# Patient Record
Sex: MALE | Race: BLACK OR AFRICAN AMERICAN | Employment: FULL TIME | URBAN - METROPOLITAN AREA
[De-identification: names, ages, dates, MRNs, and addresses within clinical notes are randomized per-mention and may not be internally consistent; named-entity substitution may affect disease eponyms.]

---

## 2018-03-26 ENCOUNTER — HOSPITAL ENCOUNTER (OUTPATIENT)
Dept: LAB | Age: 48
Discharge: HOME | End: 2018-03-26

## 2018-03-26 LAB
25(OH)D3 SERPL-MCNC: 18.2 NG/ML (ref 30–100)
ALBUMIN SERPL-MCNC: 3.8 G/DL (ref 3.5–5)
ALBUMIN/GLOB SERPL: 1.1 {RATIO} (ref 1.1–2.2)
ALP SERPL-CCNC: 82 U/L (ref 45–117)
ALT SERPL-CCNC: 20 U/L (ref 12–78)
ANION GAP SERPL CALC-SCNC: 6 MMOL/L (ref 5–15)
AST SERPL-CCNC: 13 U/L (ref 15–37)
BASOPHILS # BLD: 0.1 K/UL (ref 0–0.1)
BASOPHILS NFR BLD: 1 % (ref 0–1)
BILIRUB SERPL-MCNC: 0.6 MG/DL (ref 0.2–1)
BUN SERPL-MCNC: 15 MG/DL (ref 6–20)
BUN/CREAT SERPL: 15 (ref 12–20)
CALCIUM SERPL-MCNC: 9.2 MG/DL (ref 8.5–10.1)
CHLORIDE SERPL-SCNC: 105 MMOL/L (ref 97–108)
CHOLEST SERPL-MCNC: 187 MG/DL
CO2 SERPL-SCNC: 26 MMOL/L (ref 21–32)
CREAT SERPL-MCNC: 0.99 MG/DL (ref 0.7–1.3)
DIFFERENTIAL METHOD BLD: NORMAL
EOSINOPHIL # BLD: 0.2 K/UL (ref 0–0.4)
EOSINOPHIL NFR BLD: 3 % (ref 0–7)
ERYTHROCYTE [DISTWIDTH] IN BLOOD BY AUTOMATED COUNT: 12.6 % (ref 11.5–14.5)
EST. AVERAGE GLUCOSE BLD GHB EST-MCNC: ABNORMAL MG/DL
FERRITIN SERPL-MCNC: 535 NG/ML (ref 26–388)
GLOBULIN SER CALC-MCNC: 3.4 G/DL (ref 2–4)
GLUCOSE SERPL-MCNC: 157 MG/DL (ref 65–100)
HBA1C MFR BLD: 15.5 % (ref 4.2–6.3)
HCT VFR BLD AUTO: 42.1 % (ref 36.6–50.3)
HDLC SERPL-MCNC: 45 MG/DL
HDLC SERPL: 4.2 {RATIO} (ref 0–5)
HGB BLD-MCNC: 13.7 G/DL (ref 12.1–17)
IMM GRANULOCYTES # BLD: 0 K/UL (ref 0–0.04)
IMM GRANULOCYTES NFR BLD AUTO: 0 % (ref 0–0.5)
LDLC SERPL CALC-MCNC: 79.4 MG/DL (ref 0–100)
LIPID PROFILE,FLP: ABNORMAL
LYMPHOCYTES # BLD: 2.1 K/UL (ref 0.8–3.5)
LYMPHOCYTES NFR BLD: 26 % (ref 12–49)
MAGNESIUM SERPL-MCNC: 1.5 MG/DL (ref 1.6–2.4)
MCH RBC QN AUTO: 29.7 PG (ref 26–34)
MCHC RBC AUTO-ENTMCNC: 32.5 G/DL (ref 30–36.5)
MCV RBC AUTO: 91.3 FL (ref 80–99)
MONOCYTES # BLD: 0.6 K/UL (ref 0–1)
MONOCYTES NFR BLD: 8 % (ref 5–13)
NEUTS SEG # BLD: 5.1 K/UL (ref 1.8–8)
NEUTS SEG NFR BLD: 63 % (ref 32–75)
NRBC # BLD: 0 K/UL (ref 0–0.01)
NRBC BLD-RTO: 0 PER 100 WBC
PLATELET # BLD AUTO: 253 K/UL (ref 150–400)
PMV BLD AUTO: 12.7 FL (ref 8.9–12.9)
POTASSIUM SERPL-SCNC: 4.3 MMOL/L (ref 3.5–5.1)
PROT SERPL-MCNC: 7.2 G/DL (ref 6.4–8.2)
RBC # BLD AUTO: 4.61 M/UL (ref 4.1–5.7)
SODIUM SERPL-SCNC: 137 MMOL/L (ref 136–145)
TRIGL SERPL-MCNC: 313 MG/DL (ref ?–150)
TSH SERPL DL<=0.05 MIU/L-ACNC: 0.98 UIU/ML (ref 0.36–3.74)
URATE SERPL-MCNC: 4.9 MG/DL (ref 3.5–7.2)
VLDLC SERPL CALC-MCNC: 62.6 MG/DL
WBC # BLD AUTO: 8.2 K/UL (ref 4.1–11.1)

## 2018-03-26 PROCEDURE — 82728 ASSAY OF FERRITIN: CPT | Performed by: FAMILY MEDICINE

## 2018-03-26 PROCEDURE — 85025 COMPLETE CBC W/AUTO DIFF WBC: CPT | Performed by: FAMILY MEDICINE

## 2018-03-26 PROCEDURE — 84550 ASSAY OF BLOOD/URIC ACID: CPT | Performed by: FAMILY MEDICINE

## 2018-03-26 PROCEDURE — 83036 HEMOGLOBIN GLYCOSYLATED A1C: CPT | Performed by: FAMILY MEDICINE

## 2018-03-26 PROCEDURE — 82306 VITAMIN D 25 HYDROXY: CPT | Performed by: FAMILY MEDICINE

## 2018-03-26 PROCEDURE — 87389 HIV-1 AG W/HIV-1&-2 AB AG IA: CPT | Performed by: FAMILY MEDICINE

## 2018-03-26 PROCEDURE — 80053 COMPREHEN METABOLIC PANEL: CPT | Performed by: FAMILY MEDICINE

## 2018-03-26 PROCEDURE — 86592 SYPHILIS TEST NON-TREP QUAL: CPT | Performed by: FAMILY MEDICINE

## 2018-03-26 PROCEDURE — 84153 ASSAY OF PSA TOTAL: CPT | Performed by: FAMILY MEDICINE

## 2018-03-26 PROCEDURE — 83735 ASSAY OF MAGNESIUM: CPT | Performed by: FAMILY MEDICINE

## 2018-03-26 PROCEDURE — 80061 LIPID PANEL: CPT | Performed by: FAMILY MEDICINE

## 2018-03-26 PROCEDURE — 84443 ASSAY THYROID STIM HORMONE: CPT | Performed by: FAMILY MEDICINE

## 2018-03-27 LAB — RPR SER QL: NONREACTIVE

## 2018-03-28 LAB
HIV 1+2 AB+HIV1 P24 AG SERPL QL IA: NONREACTIVE
HIV12 RESULT COMMENT, HHIVC: NORMAL
PSA SERPL-MCNC: 0.4 NG/ML (ref 0–4)

## 2018-07-31 ENCOUNTER — HOSPITAL ENCOUNTER (OUTPATIENT)
Dept: LAB | Age: 48
Discharge: HOME | End: 2018-07-31

## 2018-07-31 LAB
25(OH)D3 SERPL-MCNC: 16.3 NG/ML (ref 30–100)
ALBUMIN SERPL-MCNC: 4 G/DL (ref 3.5–5)
ALBUMIN/GLOB SERPL: 1.2 {RATIO} (ref 1.1–2.2)
ALP SERPL-CCNC: 96 U/L (ref 45–117)
ALT SERPL-CCNC: 23 U/L (ref 12–78)
ANION GAP SERPL CALC-SCNC: 7 MMOL/L (ref 5–15)
AST SERPL-CCNC: 13 U/L (ref 15–37)
BILIRUB SERPL-MCNC: 0.6 MG/DL (ref 0.2–1)
BUN SERPL-MCNC: 14 MG/DL (ref 6–20)
BUN/CREAT SERPL: 14 (ref 12–20)
CALCIUM SERPL-MCNC: 9 MG/DL (ref 8.5–10.1)
CHLORIDE SERPL-SCNC: 99 MMOL/L (ref 97–108)
CHOLEST SERPL-MCNC: 200 MG/DL
CO2 SERPL-SCNC: 30 MMOL/L (ref 21–32)
CREAT SERPL-MCNC: 1.03 MG/DL (ref 0.7–1.3)
EST. AVERAGE GLUCOSE BLD GHB EST-MCNC: 217 MG/DL
FERRITIN SERPL-MCNC: 454 NG/ML (ref 26–388)
GLOBULIN SER CALC-MCNC: 3.4 G/DL (ref 2–4)
GLUCOSE SERPL-MCNC: 273 MG/DL (ref 65–100)
HBA1C MFR BLD: 9.2 % (ref 4.2–6.3)
HDLC SERPL-MCNC: 50 MG/DL
HDLC SERPL: 4 {RATIO} (ref 0–5)
LDLC SERPL CALC-MCNC: 96.6 MG/DL (ref 0–100)
LIPID PROFILE,FLP: ABNORMAL
MAGNESIUM SERPL-MCNC: 1.5 MG/DL (ref 1.6–2.4)
POTASSIUM SERPL-SCNC: 4.4 MMOL/L (ref 3.5–5.1)
PROT SERPL-MCNC: 7.4 G/DL (ref 6.4–8.2)
SODIUM SERPL-SCNC: 136 MMOL/L (ref 136–145)
TRIGL SERPL-MCNC: 267 MG/DL (ref ?–150)
VLDLC SERPL CALC-MCNC: 53.4 MG/DL

## 2018-07-31 PROCEDURE — 83735 ASSAY OF MAGNESIUM: CPT | Performed by: FAMILY MEDICINE

## 2018-07-31 PROCEDURE — 82728 ASSAY OF FERRITIN: CPT | Performed by: FAMILY MEDICINE

## 2018-07-31 PROCEDURE — 83036 HEMOGLOBIN GLYCOSYLATED A1C: CPT | Performed by: FAMILY MEDICINE

## 2018-07-31 PROCEDURE — 82306 VITAMIN D 25 HYDROXY: CPT | Performed by: FAMILY MEDICINE

## 2018-07-31 PROCEDURE — 80053 COMPREHEN METABOLIC PANEL: CPT | Performed by: FAMILY MEDICINE

## 2018-07-31 PROCEDURE — 80061 LIPID PANEL: CPT | Performed by: FAMILY MEDICINE

## 2019-05-21 ENCOUNTER — HOSPITAL ENCOUNTER (EMERGENCY)
Age: 49
Discharge: HOME | End: 2019-05-21
Attending: EMERGENCY MEDICINE
Payer: COMMERCIAL

## 2019-05-21 VITALS
SYSTOLIC BLOOD PRESSURE: 144 MMHG | WEIGHT: 315 LBS | DIASTOLIC BLOOD PRESSURE: 94 MMHG | HEART RATE: 99 BPM | BODY MASS INDEX: 40.43 KG/M2 | HEIGHT: 74 IN | TEMPERATURE: 98.2 F | OXYGEN SATURATION: 96 % | RESPIRATION RATE: 16 BRPM
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
## 2019-05-21 DIAGNOSIS — S09.90XA CLOSED HEAD INJURY, INITIAL ENCOUNTER: ICD-10-CM

## 2019-05-21 DIAGNOSIS — S01.111A RIGHT EYELID LACERATION, INITIAL ENCOUNTER: Primary | ICD-10-CM

## 2019-05-21 DIAGNOSIS — S80.01XA CONTUSION OF RIGHT KNEE, INITIAL ENCOUNTER: ICD-10-CM

## 2019-05-21 PROCEDURE — 74011250637 HC RX REV CODE- 250/637: Performed by: PHYSICIAN ASSISTANT

## 2019-05-21 PROCEDURE — 75810000293 HC SIMP/SUPERF WND  RPR

## 2019-05-21 PROCEDURE — 77030002888 HC SUT CHRMC J&J -A

## 2019-05-21 PROCEDURE — 90471 IMMUNIZATION ADMIN: CPT

## 2019-05-21 PROCEDURE — 77030018836 HC SOL IRR NACL ICUM -A

## 2019-05-21 PROCEDURE — 74011250636 HC RX REV CODE- 250/636: Performed by: PHYSICIAN ASSISTANT

## 2019-05-21 PROCEDURE — 99283 EMERGENCY DEPT VISIT LOW MDM: CPT

## 2019-05-21 PROCEDURE — 90715 TDAP VACCINE 7 YRS/> IM: CPT | Performed by: PHYSICIAN ASSISTANT

## 2019-05-21 RX ORDER — METFORMIN HYDROCHLORIDE 1000 MG/1
1000 TABLET ORAL 2 TIMES DAILY WITH MEALS
COMMUNITY

## 2019-05-21 RX ORDER — LIDOCAINE HYDROCHLORIDE 10 MG/ML
5 INJECTION, SOLUTION EPIDURAL; INFILTRATION; INTRACAUDAL; PERINEURAL ONCE
Status: COMPLETED | OUTPATIENT
Start: 2019-05-21 | End: 2019-05-21

## 2019-05-21 RX ORDER — POLYMYXIN B SULFATE AND TRIMETHOPRIM 1; 10000 MG/ML; [USP'U]/ML
1 SOLUTION OPHTHALMIC EVERY 4 HOURS
Qty: 10 ML | Refills: 0 | Status: SHIPPED | OUTPATIENT
Start: 2019-05-21 | End: 2021-06-07

## 2019-05-21 RX ORDER — IBUPROFEN 400 MG/1
800 TABLET ORAL
Status: COMPLETED | OUTPATIENT
Start: 2019-05-21 | End: 2019-05-21

## 2019-05-21 RX ADMIN — TETANUS TOXOID, REDUCED DIPHTHERIA TOXOID AND ACELLULAR PERTUSSIS VACCINE, ADSORBED 0.5 ML: 5; 2.5; 8; 8; 2.5 SUSPENSION INTRAMUSCULAR at 10:28

## 2019-05-21 RX ADMIN — IBUPROFEN 800 MG: 400 TABLET, FILM COATED ORAL at 10:28

## 2019-05-21 RX ADMIN — LIDOCAINE HYDROCHLORIDE 5 ML: 10 INJECTION, SOLUTION EPIDURAL; INFILTRATION; INTRACAUDAL; PERINEURAL at 10:32
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
## 2019-05-21 NOTE — LETTER
[facility] EMERGENCY DEPT 
[address] [place] [address] [address] [id number] [phone] Work/School Note Date: 5/21/2019 To Whom It May concern: 
 
Alan Mcdonald was seen and treated today in the emergency room by the following provider(s): 
Attending Provider: Jennifer Strong MD 
Physician Assistant: Allen Labs, PA. Please excuse him from work today and tomorrow.  
 
Sincerely, 
 
 
 
 
RAMANDEEP Mccain
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
## 2019-06-07 ENCOUNTER — HOSPITAL ENCOUNTER (EMERGENCY)
Age: 49
Discharge: HOME | End: 2019-06-07
Attending: EMERGENCY MEDICINE
Payer: SELF-PAY

## 2019-06-07 ENCOUNTER — APPOINTMENT (OUTPATIENT)
Dept: CT IMAGING | Age: 49
End: 2019-06-07
Attending: EMERGENCY MEDICINE
Payer: SELF-PAY

## 2019-06-07 ENCOUNTER — APPOINTMENT (OUTPATIENT)
Dept: GENERAL RADIOLOGY | Age: 49
End: 2019-06-07
Attending: EMERGENCY MEDICINE
Payer: SELF-PAY

## 2019-06-07 VITALS
SYSTOLIC BLOOD PRESSURE: 116 MMHG | HEART RATE: 87 BPM | OXYGEN SATURATION: 95 % | DIASTOLIC BLOOD PRESSURE: 72 MMHG | RESPIRATION RATE: 14 BRPM

## 2019-06-07 DIAGNOSIS — R40.4 TRANSIENT ALTERATION OF AWARENESS: Primary | ICD-10-CM

## 2019-06-07 LAB
ALBUMIN SERPL-MCNC: 3.6 G/DL (ref 3.5–5)
ALBUMIN/GLOB SERPL: 1 {RATIO} (ref 1.1–2.2)
ALP SERPL-CCNC: 98 U/L (ref 45–117)
ALT SERPL-CCNC: 20 U/L (ref 12–78)
AMMONIA PLAS-SCNC: 27 UMOL/L
AMPHET UR QL SCN: NEGATIVE
ANION GAP SERPL CALC-SCNC: 10 MMOL/L (ref 5–15)
AST SERPL-CCNC: 11 U/L (ref 15–37)
ATRIAL RATE: 85 BPM
ATRIAL RATE: 90 BPM
BARBITURATES UR QL SCN: NEGATIVE
BASOPHILS # BLD: 0.1 K/UL (ref 0–0.1)
BASOPHILS NFR BLD: 0 % (ref 0–1)
BENZODIAZ UR QL: NEGATIVE
BILIRUB SERPL-MCNC: 1 MG/DL (ref 0.2–1)
BUN SERPL-MCNC: 10 MG/DL (ref 6–20)
BUN/CREAT SERPL: 6 (ref 12–20)
CALCIUM SERPL-MCNC: 9 MG/DL (ref 8.5–10.1)
CALCULATED P AXIS, ECG09: 58 DEGREES
CALCULATED P AXIS, ECG09: 63 DEGREES
CALCULATED R AXIS, ECG10: 53 DEGREES
CALCULATED R AXIS, ECG10: 58 DEGREES
CALCULATED T AXIS, ECG11: 69 DEGREES
CALCULATED T AXIS, ECG11: 70 DEGREES
CANNABINOIDS UR QL SCN: POSITIVE
CHLORIDE SERPL-SCNC: 98 MMOL/L (ref 97–108)
CO2 SERPL-SCNC: 26 MMOL/L (ref 21–32)
COCAINE UR QL SCN: POSITIVE
COMMENT, HOLDF: NORMAL
CREAT SERPL-MCNC: 1.57 MG/DL (ref 0.7–1.3)
DIAGNOSIS, 93000: NORMAL
DIAGNOSIS, 93000: NORMAL
DIFFERENTIAL METHOD BLD: ABNORMAL
DRUG SCRN COMMENT,DRGCM: ABNORMAL
EOSINOPHIL # BLD: 0.2 K/UL (ref 0–0.4)
EOSINOPHIL NFR BLD: 2 % (ref 0–7)
ERYTHROCYTE [DISTWIDTH] IN BLOOD BY AUTOMATED COUNT: 12.9 % (ref 11.5–14.5)
GLOBULIN SER CALC-MCNC: 3.7 G/DL (ref 2–4)
GLUCOSE BLD STRIP.AUTO-MCNC: 309 MG/DL (ref 65–100)
GLUCOSE BLD STRIP.AUTO-MCNC: 353 MG/DL (ref 65–100)
GLUCOSE BLD STRIP.AUTO-MCNC: 371 MG/DL (ref 65–100)
GLUCOSE BLD STRIP.AUTO-MCNC: 416 MG/DL (ref 65–100)
GLUCOSE BLD STRIP.AUTO-MCNC: 482 MG/DL (ref 65–100)
GLUCOSE SERPL-MCNC: 445 MG/DL (ref 65–100)
HCT VFR BLD AUTO: 44.9 % (ref 36.6–50.3)
HGB BLD-MCNC: 14.8 G/DL (ref 12.1–17)
IMM GRANULOCYTES # BLD AUTO: 0.1 K/UL (ref 0–0.04)
IMM GRANULOCYTES NFR BLD AUTO: 1 % (ref 0–0.5)
LYMPHOCYTES # BLD: 3.7 K/UL (ref 0.8–3.5)
LYMPHOCYTES NFR BLD: 31 % (ref 12–49)
MAGNESIUM SERPL-MCNC: 1.7 MG/DL (ref 1.6–2.4)
MCH RBC QN AUTO: 29.3 PG (ref 26–34)
MCHC RBC AUTO-ENTMCNC: 33 G/DL (ref 30–36.5)
MCV RBC AUTO: 88.9 FL (ref 80–99)
METHADONE UR QL: NEGATIVE
MONOCYTES # BLD: 0.7 K/UL (ref 0–1)
MONOCYTES NFR BLD: 6 % (ref 5–13)
NEUTS SEG # BLD: 7.3 K/UL (ref 1.8–8)
NEUTS SEG NFR BLD: 60 % (ref 32–75)
NRBC # BLD: 0 K/UL (ref 0–0.01)
NRBC BLD-RTO: 0 PER 100 WBC
OPIATES UR QL: NEGATIVE
P-R INTERVAL, ECG05: 166 MS
P-R INTERVAL, ECG05: 168 MS
PCP UR QL: NEGATIVE
PLATELET # BLD AUTO: 260 K/UL (ref 150–400)
PMV BLD AUTO: 11.9 FL (ref 8.9–12.9)
POTASSIUM SERPL-SCNC: 3.7 MMOL/L (ref 3.5–5.1)
PROT SERPL-MCNC: 7.3 G/DL (ref 6.4–8.2)
Q-T INTERVAL, ECG07: 356 MS
Q-T INTERVAL, ECG07: 376 MS
QRS DURATION, ECG06: 72 MS
QRS DURATION, ECG06: 76 MS
QTC CALCULATION (BEZET), ECG08: 423 MS
QTC CALCULATION (BEZET), ECG08: 459 MS
RBC # BLD AUTO: 5.05 M/UL (ref 4.1–5.7)
SAMPLES BEING HELD,HOLD: NORMAL
SERVICE CMNT-IMP: ABNORMAL
SODIUM SERPL-SCNC: 134 MMOL/L (ref 136–145)
TROPONIN I SERPL-MCNC: <0.05 NG/ML
TROPONIN I SERPL-MCNC: <0.05 NG/ML
VENTRICULAR RATE, ECG03: 85 BPM
VENTRICULAR RATE, ECG03: 90 BPM
WBC # BLD AUTO: 12.1 K/UL (ref 4.1–11.1)

## 2019-06-07 PROCEDURE — 85025 COMPLETE CBC W/AUTO DIFF WBC: CPT

## 2019-06-07 PROCEDURE — 70450 CT HEAD/BRAIN W/O DYE: CPT

## 2019-06-07 PROCEDURE — 36415 COLL VENOUS BLD VENIPUNCTURE: CPT

## 2019-06-07 PROCEDURE — 96361 HYDRATE IV INFUSION ADD-ON: CPT

## 2019-06-07 PROCEDURE — 82962 GLUCOSE BLOOD TEST: CPT

## 2019-06-07 PROCEDURE — 96374 THER/PROPH/DIAG INJ IV PUSH: CPT

## 2019-06-07 PROCEDURE — 80053 COMPREHEN METABOLIC PANEL: CPT

## 2019-06-07 PROCEDURE — 99285 EMERGENCY DEPT VISIT HI MDM: CPT

## 2019-06-07 PROCEDURE — 83735 ASSAY OF MAGNESIUM: CPT

## 2019-06-07 PROCEDURE — 82140 ASSAY OF AMMONIA: CPT

## 2019-06-07 PROCEDURE — 74011250636 HC RX REV CODE- 250/636: Performed by: EMERGENCY MEDICINE

## 2019-06-07 PROCEDURE — 74011636637 HC RX REV CODE- 636/637: Performed by: EMERGENCY MEDICINE

## 2019-06-07 PROCEDURE — 93005 ELECTROCARDIOGRAM TRACING: CPT

## 2019-06-07 PROCEDURE — 80307 DRUG TEST PRSMV CHEM ANLYZR: CPT

## 2019-06-07 PROCEDURE — 84484 ASSAY OF TROPONIN QUANT: CPT

## 2019-06-07 PROCEDURE — 71045 X-RAY EXAM CHEST 1 VIEW: CPT

## 2019-06-07 RX ORDER — NALOXONE HYDROCHLORIDE 1 MG/ML
1 INJECTION INTRAMUSCULAR; INTRAVENOUS; SUBCUTANEOUS
Status: COMPLETED | OUTPATIENT
Start: 2019-06-07 | End: 2019-06-07

## 2019-06-07 RX ORDER — DEXTROSE 50 % IN WATER (D50W) INTRAVENOUS SYRINGE
Status: DISCONTINUED
Start: 2019-06-07 | End: 2019-06-07 | Stop reason: HOSPADM

## 2019-06-07 RX ORDER — NALOXONE HYDROCHLORIDE 1 MG/ML
INJECTION INTRAMUSCULAR; INTRAVENOUS; SUBCUTANEOUS
Status: DISCONTINUED
Start: 2019-06-07 | End: 2019-06-07 | Stop reason: HOSPADM

## 2019-06-07 RX ADMIN — NALOXONE HYDROCHLORIDE 1 MG: 1 INJECTION PARENTERAL at 02:41

## 2019-06-07 RX ADMIN — SODIUM CHLORIDE 1000 ML: 900 INJECTION, SOLUTION INTRAVENOUS at 02:41

## 2019-06-07 RX ADMIN — SODIUM CHLORIDE 1000 ML: 900 INJECTION, SOLUTION INTRAVENOUS at 04:11

## 2019-06-07 RX ADMIN — HUMAN INSULIN 10 UNITS: 100 INJECTION, SOLUTION SUBCUTANEOUS at 04:11

## 2019-06-07 NOTE — DISCHARGE INSTRUCTIONS
Patient Education   When you presented to the ED last night you were very groggy. You woke up after we gave you medicine that reverses opiates/ narcotic medication (percocet, heroin, etc.)   I suspect there was something in the joint you smoked last night. Blood work, EKG, cat scan of your brain, etc. Were all normal except for your blood sugar which was elevated. Get plenty of rest today and drink plenty of fluids. If you have any further episodes of feeling weak/confused/sweaty or develop any chest pain/shortness of breath/palpiations, etc. Return here     Altered Mental Status: Care Instructions  Your Care Instructions    Altered mental status is a change in how well your brain is working. As a result, you may be confused, be less alert than usual, or act in odd ways. This may include seeing or hearing things that aren't really there (hallucinations). A mental status change has many possible causes. For example, it may be the result of an infection, an imbalance of chemicals in the body, or a chronic disease such as diabetes or COPD. It can also be caused by things such as a head injury, taking certain medicines, or using alcohol or drugs. The doctor may do tests to look for the cause. These tests may include urine tests, blood tests, and imaging tests such as a CT scan. Sometimes a clear cause isn't found. But tests can help the doctor rule out a serious cause of your symptoms. A change in mental status can be scary. But mental status will often return to normal when the cause is treated. So it is important to get any follow-up testing or treatment the doctor has suggested. The doctor has checked you carefully, but problems can develop later. If you notice any problems or new symptoms, get medical treatment right away. Follow-up care is a key part of your treatment and safety. Be sure to make and go to all appointments, and call your doctor if you are having problems.  It's also a good idea to know your test results and keep a list of the medicines you take. How can you care for yourself at home? · Be safe with medicines. Take your medicines exactly as prescribed. Call your doctor if you think you are having a problem with your medicine. · Have another adult stay with you until you are better. This can help keep you safe. Ask that person to watch for signs that your mental status is getting worse. When should you call for help? Call 911 anytime you think you may need emergency care. For example, call if:    · You passed out (lost consciousness).    Call your doctor now or seek immediate medical care if:    · Your mental status is getting worse.     · You have new symptoms, such as a fever, chills, or shortness of breath.     · You do not feel safe.    Watch closely for changes in your health, and be sure to contact your doctor if:    · You do not get better as expected. Where can you learn more? Go to http://emerald-lisa.info/. Enter O753 in the search box to learn more about \"Altered Mental Status: Care Instructions. \"  Current as of: Bri 3, 2018  Content Version: 11.9  © 8852-7071 [employer]. Care instructions adapted under license by [employer] (which disclaims liability or warranty for this information). If you have questions about a medical condition or this instruction, always ask your healthcare professional. [address] any warranty or liability for your use of this information.

## 2019-06-07 NOTE — ED PROVIDER NOTES
HPI   Patient brought in by friend for altered mental status. On arrival, not able to get much history from patient would wake up to stimulus and told us he was a diabetic. Patient diaphoretic. Sugar 400. After narcan- patient became more arousable and now able to provide history- states he had a normal day,. Was in the car with his cousin waiting on someone. Started to feel week- got up and thinks he passed out. Denies using any drugs. denies chest pain or sOB. states he feels cold. Past Medical History:   Diagnosis Date    Diabetes ([facility] [address]        No past surgical history on file. No family history on file.     Social History     Socioeconomic History    Marital status: SINGLE     Spouse name: Not on file    Number of children: Not on file    Years of education: Not on file    Highest education level: Not on file   Occupational History    Not on file   Social Needs    Financial resource strain: Not on file    Food insecurity:     Worry: Not on file     Inability: Not on file    Transportation needs:     Medical: Not on file     Non-medical: Not on file   Tobacco Use    Smoking status: Current Every Day Smoker     Packs/day: 0.50    Smokeless tobacco: Never Used   Substance and Sexual Activity    Alcohol use: Not Currently     Frequency: Never    Drug use: Never    Sexual activity: Not on file   Lifestyle    Physical activity:     Days per week: Not on file     Minutes per session: Not on file    Stress: Not on file   Relationships    Social connections:     Talks on phone: Not on file     Gets together: Not on file     Attends Gnosticism service: Not on file     Active member of club or organization: Not on file     Attends meetings of clubs or organizations: Not on file     Relationship status: Not on file    Intimate partner violence:     Fear of current or ex partner: Not on file     Emotionally abused: Not on file     Physically abused: Not on file     Forced sexual activity: Not on file   Other Topics Concern    Not on file   Social History Narrative    Not on file         ALLERGIES: Patient has no known allergies. Review of Systems   Unable to perform ROS: Mental status change       There were no vitals filed for this visit. Physical Exam   Constitutional: He appears well-developed and well-nourished. No distress. HENT:   Head: Normocephalic and atraumatic. Mouth/Throat: No oropharyngeal exudate. Eyes: Pupils are equal, round, and reactive to light. Right eye exhibits no discharge. Left eye exhibits no discharge. No scleral icterus. Neck: Normal range of motion. Neck supple. No JVD present. Cardiovascular: Normal rate, regular rhythm and normal heart sounds. No murmur heard. Pulmonary/Chest: Effort normal and breath sounds normal. No stridor. No respiratory distress. He has no wheezes. He has no rales. He exhibits no tenderness. Abdominal: Soft. Bowel sounds are normal. He exhibits no distension and no mass. There is no tenderness. There is no rebound and no guarding. Musculoskeletal: Normal range of motion. Neurological:   Lethargic, responds to stimuli, able to answer questions and locate his ID in his pocket   Skin: Skin is warm. Capillary refill takes less than 2 seconds. No rash noted. He is diaphoretic. Psychiatric: He has a normal mood and affect. His behavior is normal. Judgment and thought content normal.        MDM       Procedures    ED EKG interpretation:  Rhythm: normal sinus rhythm; and regular . Rate (approx.): 85; Axis: normal; P wave: normal; QRS interval: normal ; ST/T wave: non-specific changes;. This EKG was interpreted by Melanie Medina MD,ED Provider. Patient responded to Narcan. He continues to deny heroin/opiod use. Work up reassuring. Repeat EKG and troponin unchanged. He continues to deny any preceding chest pain/palpitaitons or recent exertional cp/sob/fatigue. H/O diabetes, didn't take his insulin today.      Drug screen positive for marijuana and cocaine. Patient does admit now to smoking Marijuana that his family member provider. The  that brought him here did not smoke it. Since he responded to the narcan I suspect the marijuana has some opiod derivative laced with it. He has been stable during his entire visit, glucose coming down with fluids/insulin. serial EKG and troponin neg. [id number] Maureen Sterling for discharge with outpatient follow up. Patient instructed to return if any worsening/return of symptoms.

## 2019-06-07 NOTE — ED NOTES
Dr. Allen Hicks reviewed discharge instructions with the patient. The patient verbalized understanding. Patient ambulated out of the emergency department without assistance. Patient is alert and oriented. Patient is in no apparent distress.

## 2019-06-07 NOTE — ED TRIAGE NOTES
Pt arrives via private vehicle accompanied by a male with c/o of difficult arousal. Pt responded after numerous attempts and was assisted to a stretcher from private vehicle by 3 RNs. Pt able to state name and . Pt very diaphoretic and states he takes metformin. BG checked upon arrival to ED room and it was 416. Pt continues to be difficult to arouse but responds to name.

## 2019-06-07 NOTE — ED NOTES
Patient's blood sugar taken. Initial blood sugar was greater than prior to treatment. Took blood sugar again, and blood sugar was lower than prior to treatment. Dr. Patricio Love notified, and deferred a third reading.

## 2019-06-24 ENCOUNTER — APPOINTMENT (OUTPATIENT)
Dept: GENERAL RADIOLOGY | Age: 49
End: 2019-06-24
Attending: PHYSICIAN ASSISTANT
Payer: COMMERCIAL

## 2019-06-24 ENCOUNTER — HOSPITAL ENCOUNTER (EMERGENCY)
Age: 49
Discharge: HOME | End: 2019-06-24
Attending: EMERGENCY MEDICINE | Admitting: EMERGENCY MEDICINE
Payer: COMMERCIAL

## 2019-06-24 VITALS
OXYGEN SATURATION: 99 % | HEIGHT: 74 IN | TEMPERATURE: 97.9 F | RESPIRATION RATE: 16 BRPM | SYSTOLIC BLOOD PRESSURE: 145 MMHG | HEART RATE: 99 BPM | DIASTOLIC BLOOD PRESSURE: 94 MMHG | BODY MASS INDEX: 35.51 KG/M2 | WEIGHT: 276.68 LBS

## 2019-06-24 DIAGNOSIS — S86.911D KNEE STRAIN, RIGHT, SUBSEQUENT ENCOUNTER: Primary | ICD-10-CM

## 2019-06-24 DIAGNOSIS — F17.200 SMOKING ADDICTION: ICD-10-CM
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
## 2019-06-24 DIAGNOSIS — Z71.6 TOBACCO ABUSE COUNSELING: ICD-10-CM

## 2019-06-24 PROCEDURE — 99283 EMERGENCY DEPT VISIT LOW MDM: CPT

## 2019-06-24 PROCEDURE — 74011250637 HC RX REV CODE- 250/637: Performed by: PHYSICIAN ASSISTANT

## 2019-06-24 PROCEDURE — 73562 X-RAY EXAM OF KNEE 3: CPT

## 2019-06-24 RX ORDER — MELOXICAM 15 MG/1
15 TABLET ORAL DAILY
Qty: 20 TAB | Refills: 0 | Status: SHIPPED | OUTPATIENT
Start: 2019-06-24 | End: 2021-06-07

## 2019-06-24 RX ORDER — HYDROCODONE BITARTRATE AND ACETAMINOPHEN 5; 325 MG/1; MG/1
2 TABLET ORAL
Status: COMPLETED | OUTPATIENT
Start: 2019-06-24 | End: 2019-06-24

## 2019-06-24 RX ADMIN — HYDROCODONE BITARTRATE AND ACETAMINOPHEN 2 TABLET: 5; 325 TABLET ORAL at 08:45
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
## 2019-06-24 NOTE — ED NOTES
RAMANDEEP Mccain have reviewed discharge instructions with the patient. The patient verbalized understanding. Pt family driving pt home.
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
## 2019-06-24 NOTE — LETTER
[address] 
[facility] EMERGENCY DEPT 
[address] [address] 
[phone] Work/School Note Date: 6/24/2019 To Whom It May concern: 
 
Lise Goltz was seen and treated today in the emergency room by the following provider(s): 
Attending Provider: Kenya Valle MD 
Physician Assistant: RAMANDEEP Tobias. Lise Goltz may return to work on 06/26/2019. Sincerely, 
 
 
 
 
Noelle Cuevas  [place], [place]

## 2020-11-20 ENCOUNTER — OFFICE VISIT (OUTPATIENT)
Dept: URGENT CARE | Age: 50
End: 2020-11-20
Payer: COMMERCIAL

## 2020-11-20 VITALS — OXYGEN SATURATION: 96 % | HEART RATE: 108 BPM | RESPIRATION RATE: 17 BRPM | TEMPERATURE: 99 F

## 2020-11-20 DIAGNOSIS — Z20.822 EXPOSURE TO COVID-19 VIRUS: Primary | ICD-10-CM

## 2020-11-20 PROCEDURE — 99202 OFFICE O/P NEW SF 15 MIN: CPT | Performed by: FAMILY MEDICINE

## 2020-11-20 NOTE — PROGRESS NOTES
This patient was seen in [facility] at [address] [place] [address] [facility] while in their vehicle due to COVID-19 pandemic with PPE and focused examination in order to decrease community viral transmission. The patient/guardian gave verbal consent to treat. The history is provided by the patient. Asymptomatic  He was in car with his friend- without PPE who tested positive for covid few days before    Past Medical History:   Diagnosis Date    Diabetes ([address]         No past surgical history on file. No family history on file.      Social History     Socioeconomic History    Marital status: SINGLE     Spouse name: Not on file    Number of children: Not on file    Years of education: Not on file    Highest education level: Not on file   Occupational History    Not on file   Social Needs    Financial resource strain: Not on file    Food insecurity     Worry: Not on file     Inability: Not on file    Transportation needs     Medical: Not on file     Non-medical: Not on file   Tobacco Use    Smoking status: Current Every Day Smoker     Packs/day: 0.50    Smokeless tobacco: Never Used   Substance and Sexual Activity    Alcohol use: Not Currently     Frequency: Never    Drug use: Never    Sexual activity: Not on file   Lifestyle    Physical activity     Days per week: Not on file     Minutes per session: Not on file    Stress: Not on file   Relationships    Social connections     Talks on phone: Not on file     Gets together: Not on file     Attends Sikhism service: Not on file     Active member of club or organization: Not on file     Attends meetings of clubs or organizations: Not on file     Relationship status: Not on file    Intimate partner violence     Fear of current or ex partner: Not on file     Emotionally abused: Not on file     Physically abused: Not on file     Forced sexual activity: Not on file   Other Topics Concern    Not on file   Social History Narrative    Not on file ALLERGIES: Patient has no known allergies. Review of Systems   All other systems reviewed and are negative. Vitals:    11/20/20 1232   Pulse: (!) 108   Resp: 17   Temp: 99 °F (37.2 °C)   SpO2: 96%       Physical Exam  Vitals signs and nursing note reviewed. Constitutional:       General: He is not in acute distress. Appearance: He is not ill-appearing. Pulmonary:      Effort: Pulmonary effort is normal. No respiratory distress. Breath sounds: Normal breath sounds. MDM    Procedures    ICD-10-CM ICD-9-CM    1. Exposure to COVID-19 virus  Z20.828 V01.79 NOVEL CORONAVIRUS (COVID-19)     No orders of the defined types were placed in this encounter. No results found for any visits on 11/20/20. The patients condition was discussed with the patient and they understand. The patient is to follow up with primary care doctor. If signs and symptoms become worse the pt is to go to the ER. The patient is to take medications as prescribed.

## 2020-11-22 LAB — SARS-COV-2, NAA: NOT DETECTED

## 2021-06-07 ENCOUNTER — HOSPITAL ENCOUNTER (EMERGENCY)
Age: 51
Discharge: HOME | End: 2021-06-07
Attending: EMERGENCY MEDICINE
Payer: MEDICAID

## 2021-06-07 VITALS
TEMPERATURE: 98.6 F | HEART RATE: 93 BPM | OXYGEN SATURATION: 97 % | RESPIRATION RATE: 19 BRPM | WEIGHT: 276.68 LBS | HEIGHT: 74 IN | DIASTOLIC BLOOD PRESSURE: 86 MMHG | BODY MASS INDEX: 35.51 KG/M2 | SYSTOLIC BLOOD PRESSURE: 136 MMHG

## 2021-06-07 DIAGNOSIS — R73.9 HYPERGLYCEMIA: Primary | ICD-10-CM

## 2021-06-07 DIAGNOSIS — E11.22 TYPE 2 DIABETES MELLITUS WITH DIABETIC CHRONIC KIDNEY DISEASE, UNSPECIFIED CKD STAGE, UNSPECIFIED WHETHER LONG TERM INSULIN USE (HCC): ICD-10-CM

## 2021-06-07 LAB
ALBUMIN SERPL-MCNC: 3.5 G/DL (ref 3.5–5)
ALBUMIN/GLOB SERPL: 1 {RATIO} (ref 1.1–2.2)
ALP SERPL-CCNC: 111 U/L (ref 45–117)
ALT SERPL-CCNC: 34 U/L (ref 12–78)
ANION GAP SERPL CALC-SCNC: 8 MMOL/L (ref 5–15)
AST SERPL-CCNC: 18 U/L (ref 15–37)
BASE EXCESS BLDV CALC-SCNC: 0.8 MMOL/L
BASOPHILS # BLD: 0.1 K/UL (ref 0–0.1)
BASOPHILS NFR BLD: 1 % (ref 0–1)
BILIRUB SERPL-MCNC: 0.6 MG/DL (ref 0.2–1)
BUN SERPL-MCNC: 14 MG/DL (ref 6–20)
BUN/CREAT SERPL: 9 (ref 12–20)
CALCIUM SERPL-MCNC: 8.8 MG/DL (ref 8.5–10.1)
CHLORIDE SERPL-SCNC: 92 MMOL/L (ref 97–108)
CO2 SERPL-SCNC: 25 MMOL/L (ref 21–32)
COMMENT, HOLDF: NORMAL
CREAT SERPL-MCNC: 1.51 MG/DL (ref 0.7–1.3)
DIFFERENTIAL METHOD BLD: NORMAL
EOSINOPHIL # BLD: 0.2 K/UL (ref 0–0.4)
EOSINOPHIL NFR BLD: 2 % (ref 0–7)
ERYTHROCYTE [DISTWIDTH] IN BLOOD BY AUTOMATED COUNT: 13.6 % (ref 11.5–14.5)
GLOBULIN SER CALC-MCNC: 3.5 G/DL (ref 2–4)
GLUCOSE BLD STRIP.AUTO-MCNC: 338 MG/DL (ref 65–117)
GLUCOSE BLD STRIP.AUTO-MCNC: 478 MG/DL (ref 65–117)
GLUCOSE BLD STRIP.AUTO-MCNC: >600 MG/DL (ref 65–117)
GLUCOSE BLD STRIP.AUTO-MCNC: >600 MG/DL (ref 65–117)
GLUCOSE SERPL-MCNC: 737 MG/DL (ref 65–100)
HCO3 BLDV-SCNC: 25.5 MMOL/L (ref 23–28)
HCT VFR BLD AUTO: 41.8 % (ref 36.6–50.3)
HGB BLD-MCNC: 13.5 G/DL (ref 12.1–17)
IMM GRANULOCYTES # BLD AUTO: 0 K/UL (ref 0–0.04)
IMM GRANULOCYTES NFR BLD AUTO: 0 % (ref 0–0.5)
LYMPHOCYTES # BLD: 1.6 K/UL (ref 0.8–3.5)
LYMPHOCYTES NFR BLD: 18 % (ref 12–49)
MCH RBC QN AUTO: 28.6 PG (ref 26–34)
MCHC RBC AUTO-ENTMCNC: 32.3 G/DL (ref 30–36.5)
MCV RBC AUTO: 88.6 FL (ref 80–99)
MONOCYTES # BLD: 0.6 K/UL (ref 0–1)
MONOCYTES NFR BLD: 7 % (ref 5–13)
NEUTS SEG # BLD: 6.6 K/UL (ref 1.8–8)
NEUTS SEG NFR BLD: 72 % (ref 32–75)
NRBC # BLD: 0 K/UL (ref 0–0.01)
NRBC BLD-RTO: 0 PER 100 WBC
PCO2 BLDV: 40.4 MMHG (ref 41–51)
PH BLDV: 7.41 [PH] (ref 7.32–7.42)
PLATELET # BLD AUTO: 245 K/UL (ref 150–400)
PMV BLD AUTO: 11.9 FL (ref 8.9–12.9)
PO2 BLDV: 53 MMHG (ref 25–40)
POTASSIUM SERPL-SCNC: 4.9 MMOL/L (ref 3.5–5.1)
PROT SERPL-MCNC: 7 G/DL (ref 6.4–8.2)
RBC # BLD AUTO: 4.72 M/UL (ref 4.1–5.7)
SAMPLES BEING HELD,HOLD: NORMAL
SAO2 % BLDV: 87 % (ref 65–88)
SERVICE CMNT-IMP: ABNORMAL
SODIUM SERPL-SCNC: 125 MMOL/L (ref 136–145)
SPECIMEN TYPE: ABNORMAL
WBC # BLD AUTO: 9 K/UL (ref 4.1–11.1)

## 2021-06-07 PROCEDURE — 74011636637 HC RX REV CODE- 636/637: Performed by: EMERGENCY MEDICINE

## 2021-06-07 PROCEDURE — 82803 BLOOD GASES ANY COMBINATION: CPT

## 2021-06-07 PROCEDURE — 96374 THER/PROPH/DIAG INJ IV PUSH: CPT

## 2021-06-07 PROCEDURE — 74011250636 HC RX REV CODE- 250/636: Performed by: EMERGENCY MEDICINE

## 2021-06-07 PROCEDURE — 99285 EMERGENCY DEPT VISIT HI MDM: CPT

## 2021-06-07 PROCEDURE — 96376 TX/PRO/DX INJ SAME DRUG ADON: CPT

## 2021-06-07 PROCEDURE — 82962 GLUCOSE BLOOD TEST: CPT

## 2021-06-07 PROCEDURE — 80053 COMPREHEN METABOLIC PANEL: CPT

## 2021-06-07 PROCEDURE — 85025 COMPLETE CBC W/AUTO DIFF WBC: CPT

## 2021-06-07 RX ORDER — INSULIN GLARGINE 100 [IU]/ML
INJECTION, SOLUTION SUBCUTANEOUS
Qty: 5 PEN | Refills: 0 | Status: SHIPPED | OUTPATIENT
Start: 2021-06-07 | End: 2021-06-07 | Stop reason: SDUPTHER

## 2021-06-07 RX ORDER — METFORMIN HYDROCHLORIDE 1000 MG/1
1000 TABLET ORAL 2 TIMES DAILY WITH MEALS
Qty: 60 TABLET | Refills: 0 | Status: SHIPPED | OUTPATIENT
Start: 2021-06-07

## 2021-06-07 RX ORDER — PEN NEEDLE, DIABETIC 30 GX3/16"
NEEDLE, DISPOSABLE MISCELLANEOUS
Qty: 1 PACKAGE | Refills: 11 | Status: SHIPPED | OUTPATIENT
Start: 2021-06-07

## 2021-06-07 RX ORDER — METFORMIN HYDROCHLORIDE 1000 MG/1
1000 TABLET ORAL 2 TIMES DAILY WITH MEALS
Qty: 60 TABLET | Refills: 0 | Status: SHIPPED | OUTPATIENT
Start: 2021-06-07 | End: 2021-06-07 | Stop reason: SDUPTHER

## 2021-06-07 RX ORDER — INSULIN GLARGINE 100 [IU]/ML
INJECTION, SOLUTION SUBCUTANEOUS
Qty: 5 PEN | Refills: 0 | Status: SHIPPED | OUTPATIENT
Start: 2021-06-07

## 2021-06-07 RX ORDER — PEN NEEDLE, DIABETIC 30 GX3/16"
NEEDLE, DISPOSABLE MISCELLANEOUS
Qty: 1 PACKAGE | Refills: 11 | Status: SHIPPED | OUTPATIENT
Start: 2021-06-07 | End: 2021-06-07 | Stop reason: SDUPTHER

## 2021-06-07 RX ORDER — INSULIN GLARGINE 100 [IU]/ML
INJECTION, SOLUTION SUBCUTANEOUS
COMMUNITY
Start: 2021-04-20

## 2021-06-07 RX ORDER — LIRAGLUTIDE 6 MG/ML
INJECTION SUBCUTANEOUS
COMMUNITY
Start: 2021-03-08

## 2021-06-07 RX ADMIN — HUMAN INSULIN 5 UNITS: 100 INJECTION, SOLUTION SUBCUTANEOUS at 13:18

## 2021-06-07 RX ADMIN — SODIUM CHLORIDE 1000 ML: 9 INJECTION, SOLUTION INTRAVENOUS at 11:42

## 2021-06-07 RX ADMIN — HUMAN INSULIN 5 UNITS: 100 INJECTION, SOLUTION SUBCUTANEOUS at 12:16

## 2021-06-07 RX ADMIN — SODIUM CHLORIDE 1000 ML: 9 INJECTION, SOLUTION INTRAVENOUS at 12:17

## 2021-06-07 NOTE — ED NOTES
Pt. States he hasn't had insulin in 30 days because he hadn't had any money. He asks for something to eat.

## 2021-06-07 NOTE — ED NOTES
Dr. Cris Huff and I have reviewed discharge instructions with the patient. The patient verbalized understanding. Discussed importance of caring for his diabetes and feet and follow up with doctor.

## 2021-06-07 NOTE — DISCHARGE INSTRUCTIONS
Thank you for allowing us to provide you with medical care today. We realize that you have many choices for your emergency care needs. We thank you for choosing [facility]. Please choose us in the future for any continued health care needs. We hope we addressed all of your medical concerns. We strive to provide excellent quality care in the Emergency Department. Anything less than excellent does not meet our expectations. The exam and treatment you received in the Emergency Department were for an emergent problem and are not intended as complete care. It is important that you follow up with a doctor, nurse practitioner, or physician's assistant for ongoing care. If your symptoms worsen or you do not improve as expected and you are unable to reach your usual health care provider, you should return to the Emergency Department. We are available 24 hours a day. Take this sheet with you when you go to your follow-up visit. If you have any problem arranging the follow-up visit, contact the Emergency Department immediately. Make an appointment your family doctor for follow up of this visit. Return to the ER if you are unable to be seen in a timely manner.

## 2021-06-07 NOTE — ED PROVIDER NOTES
51-year-old male with history of diabetes presents to the emergency department with some shaking this morning with high blood sugar. He is homeless and living out of his truck and has not taken his diabetic medications for the past month. Review of the medical records indicates that he is on Metformin as well as Lantus. He currently does not have a doctor to manage his diabetes. The history is provided by the patient and medical records. High Blood Sugar   This is a chronic problem. The problem occurs constantly. The problem has not changed since onset. The patient is experiencing no pain. Pertinent negatives include no fever, no diarrhea, no nausea, no vomiting, no dysuria, no headaches, no arthralgias, no myalgias and no chest pain. Nothing worsens the pain. The pain is relieved by nothing. His past medical history is significant for DM. The patient's surgical history non-contributory. Past Medical History:   Diagnosis Date    Diabetes ([facility]        No past surgical history on file. History reviewed. No pertinent family history. Social History     Socioeconomic History    Marital status: SINGLE     Spouse name: Not on file    Number of children: Not on file    Years of education: Not on file    Highest education level: Not on file   Occupational History    Not on file   Tobacco Use    Smoking status: Current Every Day Smoker     Packs/day: 0.50    Smokeless tobacco: Never Used   Substance and Sexual Activity    Alcohol use: Not Currently    Drug use: Never    Sexual activity: Not on file   Other Topics Concern    Not on file   Social History Narrative    Not on file     Social Determinants of Health     Financial Resource Strain:     Difficulty of Paying Living Expenses:    Food Insecurity:     Worried About Running Out of Food in the Last Year:     [address] in the Last Year:    Transportation Needs:     Lack of Transportation (Medical):      Lack of Transportation (Non-Medical):    Physical Activity:     Days of Exercise per Week:     Minutes of Exercise per Session:    Stress:     Feeling of Stress :    Social Connections:     Frequency of Communication with Friends and Family:     Frequency of Social Gatherings with Friends and Family:     Attends Taoist Services:     Active Member of Clubs or Organizations:     Attends Club or Organization Meetings:     Marital Status:    Intimate Partner Violence:     Fear of Current or Ex-Partner:     Emotionally Abused:     Physically Abused:     Sexually Abused: ALLERGIES: Patient has no known allergies. Review of Systems   Constitutional: Negative for fatigue and fever. HENT: Negative for sneezing and sore throat. Respiratory: Negative for cough and shortness of breath. Cardiovascular: Negative for chest pain and leg swelling. Gastrointestinal: Negative for abdominal pain, diarrhea, nausea and vomiting. Genitourinary: Negative for difficulty urinating and dysuria. Musculoskeletal: Negative for arthralgias and myalgias. Skin: Negative for color change and rash. Neurological: Negative for weakness and headaches. Psychiatric/Behavioral: Negative for agitation and behavioral problems. Vitals:    06/07/21 1021   BP: 126/69   Pulse: 93   Resp: 19   Temp: 98.6 °F (37 °C)   SpO2: 96%   Weight: 125.5 kg (276 lb 10.8 oz)   Height: 6' 2\" (1.88 m)            Physical Exam  Vitals and nursing note reviewed. Constitutional:       General: He is not in acute distress. Appearance: Normal appearance. He is well-developed. He is obese. He is not ill-appearing, toxic-appearing or diaphoretic. HENT:      Head: Normocephalic and atraumatic. Nose: Nose normal.      Mouth/Throat:      Mouth: Mucous membranes are moist.      Pharynx: Oropharynx is clear. Eyes:      Extraocular Movements: Extraocular movements intact.       Conjunctiva/sclera: Conjunctivae normal.      Pupils: Pupils are equal, round, and reactive to light. Cardiovascular:      Rate and Rhythm: Normal rate and regular rhythm. Pulses: Normal pulses. Heart sounds: No murmur heard. Pulmonary:      Effort: Pulmonary effort is normal. No respiratory distress. Breath sounds: Normal breath sounds. No wheezing. Chest:      Chest wall: No mass or tenderness. Abdominal:      General: There is no distension. Palpations: Abdomen is soft. Tenderness: There is no abdominal tenderness. There is no guarding or rebound. Musculoskeletal:         General: No swelling, tenderness, deformity or signs of injury. Normal range of motion. Cervical back: Normal range of motion and neck supple. No rigidity. No muscular tenderness. Right lower leg: No tenderness. No edema. Left lower leg: No tenderness. No edema. Skin:     General: Skin is warm and dry. Capillary Refill: Capillary refill takes less than 2 seconds. Neurological:      General: No focal deficit present. Mental Status: He is alert and oriented to person, place, and time. Psychiatric:         Mood and Affect: Mood normal.         Behavior: Behavior normal.          MDM  Number of Diagnoses or Management Options  Hyperglycemia  Type 2 diabetes mellitus with diabetic chronic kidney disease, unspecified CKD stage, unspecified whether long term insulin use ([facility]  Diagnosis management comments: 59-year-old male with history of diabetes presents as above with hyperglycemia after running out of his diabetes medications. Plan to restart him on low-dose Lantus, Metformin, follow-up with primary care. No indications for hospitalization at this time.        Amount and/or Complexity of Data Reviewed  Clinical lab tests: reviewed  Decide to obtain previous medical records or to obtain history from someone other than the patient: yes           Procedures

## 2023-01-15 ENCOUNTER — HOSPITAL ENCOUNTER (INPATIENT)
Age: 53
LOS: 4 days | Discharge: HOME | DRG: 314 | End: 2023-01-19
Attending: EMERGENCY MEDICINE | Admitting: INTERNAL MEDICINE
Payer: COMMERCIAL

## 2023-01-15 ENCOUNTER — APPOINTMENT (OUTPATIENT)
Dept: GENERAL RADIOLOGY | Age: 53
DRG: 314 | End: 2023-01-15
Attending: EMERGENCY MEDICINE
Payer: COMMERCIAL

## 2023-01-15 DIAGNOSIS — E11.65 TYPE 2 DIABETES MELLITUS WITH HYPERGLYCEMIA, WITHOUT LONG-TERM CURRENT USE OF INSULIN (HCC): ICD-10-CM

## 2023-01-15 DIAGNOSIS — M86.8X7 OTHER OSTEOMYELITIS OF LEFT FOOT (HCC): ICD-10-CM

## 2023-01-15 DIAGNOSIS — M86.9 OSTEOMYELITIS OF LEFT FOOT, UNSPECIFIED TYPE (HCC): Primary | ICD-10-CM

## 2023-01-15 LAB
ALBUMIN SERPL-MCNC: 3.1 G/DL (ref 3.5–5)
ALBUMIN/GLOB SERPL: 0.7 (ref 1.1–2.2)
ALP SERPL-CCNC: 96 U/L (ref 45–117)
ALT SERPL-CCNC: 20 U/L (ref 12–78)
ANION GAP SERPL CALC-SCNC: 5 MMOL/L (ref 5–15)
AST SERPL-CCNC: 14 U/L (ref 15–37)
BASOPHILS # BLD: 0.1 K/UL (ref 0–0.1)
BASOPHILS NFR BLD: 1 % (ref 0–1)
BILIRUB SERPL-MCNC: 0.4 MG/DL (ref 0.2–1)
BUN SERPL-MCNC: 13 MG/DL (ref 6–20)
BUN/CREAT SERPL: 14 (ref 12–20)
CALCIUM SERPL-MCNC: 9.2 MG/DL (ref 8.5–10.1)
CHLORIDE SERPL-SCNC: 104 MMOL/L (ref 97–108)
CO2 SERPL-SCNC: 32 MMOL/L (ref 21–32)
CREAT SERPL-MCNC: 0.93 MG/DL (ref 0.7–1.3)
CRP SERPL-MCNC: 2.36 MG/DL
DIFFERENTIAL METHOD BLD: NORMAL
EOSINOPHIL # BLD: 0.3 K/UL (ref 0–0.4)
EOSINOPHIL NFR BLD: 3 % (ref 0–7)
ERYTHROCYTE [DISTWIDTH] IN BLOOD BY AUTOMATED COUNT: 12.8 % (ref 11.5–14.5)
ERYTHROCYTE [SEDIMENTATION RATE] IN BLOOD: 56 MM/HR (ref 0–20)
GLOBULIN SER CALC-MCNC: 4.6 G/DL (ref 2–4)
GLUCOSE BLD STRIP.AUTO-MCNC: 223 MG/DL (ref 65–117)
GLUCOSE SERPL-MCNC: 218 MG/DL (ref 65–100)
HCT VFR BLD AUTO: 41.8 % (ref 36.6–50.3)
HGB BLD-MCNC: 13.6 G/DL (ref 12.1–17)
IMM GRANULOCYTES # BLD AUTO: 0 K/UL (ref 0–0.04)
IMM GRANULOCYTES NFR BLD AUTO: 0 % (ref 0–0.5)
LACTATE SERPL-SCNC: 0.9 MMOL/L (ref 0.4–2)
LYMPHOCYTES # BLD: 2.2 K/UL (ref 0.8–3.5)
LYMPHOCYTES NFR BLD: 22 % (ref 12–49)
MCH RBC QN AUTO: 28.8 PG (ref 26–34)
MCHC RBC AUTO-ENTMCNC: 32.5 G/DL (ref 30–36.5)
MCV RBC AUTO: 88.6 FL (ref 80–99)
MONOCYTES # BLD: 0.8 K/UL (ref 0–1)
MONOCYTES NFR BLD: 8 % (ref 5–13)
NEUTS SEG # BLD: 6.7 K/UL (ref 1.8–8)
NEUTS SEG NFR BLD: 66 % (ref 32–75)
NRBC # BLD: 0 K/UL (ref 0–0.01)
NRBC BLD-RTO: 0 PER 100 WBC
PLATELET # BLD AUTO: 323 K/UL (ref 150–400)
PMV BLD AUTO: 10.6 FL (ref 8.9–12.9)
POTASSIUM SERPL-SCNC: 4.1 MMOL/L (ref 3.5–5.1)
PROT SERPL-MCNC: 7.7 G/DL (ref 6.4–8.2)
RBC # BLD AUTO: 4.72 M/UL (ref 4.1–5.7)
SERVICE CMNT-IMP: ABNORMAL
SODIUM SERPL-SCNC: 141 MMOL/L (ref 136–145)
WBC # BLD AUTO: 10.2 K/UL (ref 4.1–11.1)

## 2023-01-15 PROCEDURE — 65270000029 HC RM PRIVATE

## 2023-01-15 PROCEDURE — 74011250636 HC RX REV CODE- 250/636: Performed by: INTERNAL MEDICINE

## 2023-01-15 PROCEDURE — 74011636637 HC RX REV CODE- 636/637: Performed by: INTERNAL MEDICINE

## 2023-01-15 PROCEDURE — 36415 COLL VENOUS BLD VENIPUNCTURE: CPT

## 2023-01-15 PROCEDURE — 73630 X-RAY EXAM OF FOOT: CPT

## 2023-01-15 PROCEDURE — 87040 BLOOD CULTURE FOR BACTERIA: CPT

## 2023-01-15 PROCEDURE — 74011000250 HC RX REV CODE- 250: Performed by: EMERGENCY MEDICINE

## 2023-01-15 PROCEDURE — 83605 ASSAY OF LACTIC ACID: CPT

## 2023-01-15 PROCEDURE — 82962 GLUCOSE BLOOD TEST: CPT

## 2023-01-15 PROCEDURE — 85652 RBC SED RATE AUTOMATED: CPT

## 2023-01-15 PROCEDURE — 74011000258 HC RX REV CODE- 258: Performed by: INTERNAL MEDICINE

## 2023-01-15 PROCEDURE — 74011250636 HC RX REV CODE- 250/636: Performed by: EMERGENCY MEDICINE

## 2023-01-15 PROCEDURE — 80053 COMPREHEN METABOLIC PANEL: CPT

## 2023-01-15 PROCEDURE — 85025 COMPLETE CBC W/AUTO DIFF WBC: CPT

## 2023-01-15 PROCEDURE — 86140 C-REACTIVE PROTEIN: CPT

## 2023-01-15 PROCEDURE — 99285 EMERGENCY DEPT VISIT HI MDM: CPT

## 2023-01-15 PROCEDURE — 96374 THER/PROPH/DIAG INJ IV PUSH: CPT

## 2023-01-15 RX ORDER — ENOXAPARIN SODIUM 100 MG/ML
40 INJECTION SUBCUTANEOUS DAILY
Status: DISCONTINUED | OUTPATIENT
Start: 2023-01-16 | End: 2023-01-16

## 2023-01-15 RX ORDER — MORPHINE SULFATE 2 MG/ML
2 INJECTION, SOLUTION INTRAMUSCULAR; INTRAVENOUS
Status: DISCONTINUED | OUTPATIENT
Start: 2023-01-15 | End: 2023-01-19 | Stop reason: HOSPADM

## 2023-01-15 RX ORDER — INSULIN LISPRO 100 [IU]/ML
INJECTION, SOLUTION INTRAVENOUS; SUBCUTANEOUS
Status: DISCONTINUED | OUTPATIENT
Start: 2023-01-15 | End: 2023-01-19 | Stop reason: HOSPADM

## 2023-01-15 RX ORDER — ACETAMINOPHEN 325 MG/1
650 TABLET ORAL
Status: DISCONTINUED | OUTPATIENT
Start: 2023-01-15 | End: 2023-01-19 | Stop reason: HOSPADM

## 2023-01-15 RX ORDER — SODIUM CHLORIDE 0.9 % (FLUSH) 0.9 %
5-40 SYRINGE (ML) INJECTION EVERY 8 HOURS
Status: DISCONTINUED | OUTPATIENT
Start: 2023-01-15 | End: 2023-01-19 | Stop reason: HOSPADM

## 2023-01-15 RX ORDER — VANCOMYCIN 2 GRAM/500 ML IN 0.9 % SODIUM CHLORIDE INTRAVENOUS
2 ONCE
Status: DISCONTINUED | OUTPATIENT
Start: 2023-01-15 | End: 2023-01-15 | Stop reason: DRUGHIGH

## 2023-01-15 RX ORDER — ONDANSETRON 2 MG/ML
4 INJECTION INTRAMUSCULAR; INTRAVENOUS
Status: DISCONTINUED | OUTPATIENT
Start: 2023-01-15 | End: 2023-01-19 | Stop reason: HOSPADM

## 2023-01-15 RX ORDER — ONDANSETRON 4 MG/1
4 TABLET, ORALLY DISINTEGRATING ORAL
Status: DISCONTINUED | OUTPATIENT
Start: 2023-01-15 | End: 2023-01-19 | Stop reason: HOSPADM

## 2023-01-15 RX ORDER — OXYCODONE AND ACETAMINOPHEN 5; 325 MG/1; MG/1
1 TABLET ORAL
Status: DISCONTINUED | OUTPATIENT
Start: 2023-01-15 | End: 2023-01-16 | Stop reason: SDUPTHER

## 2023-01-15 RX ORDER — INSULIN GLARGINE 100 [IU]/ML
10 INJECTION, SOLUTION SUBCUTANEOUS DAILY
Status: DISCONTINUED | OUTPATIENT
Start: 2023-01-16 | End: 2023-01-17

## 2023-01-15 RX ORDER — SODIUM CHLORIDE, SODIUM LACTATE, POTASSIUM CHLORIDE, CALCIUM CHLORIDE 600; 310; 30; 20 MG/100ML; MG/100ML; MG/100ML; MG/100ML
100 INJECTION, SOLUTION INTRAVENOUS CONTINUOUS
Status: DISCONTINUED | OUTPATIENT
Start: 2023-01-15 | End: 2023-01-19 | Stop reason: HOSPADM

## 2023-01-15 RX ORDER — IBUPROFEN 200 MG
4 TABLET ORAL AS NEEDED
Status: DISCONTINUED | OUTPATIENT
Start: 2023-01-15 | End: 2023-01-19 | Stop reason: HOSPADM

## 2023-01-15 RX ORDER — ACETAMINOPHEN 650 MG/1
650 SUPPOSITORY RECTAL
Status: DISCONTINUED | OUTPATIENT
Start: 2023-01-15 | End: 2023-01-19 | Stop reason: HOSPADM

## 2023-01-15 RX ORDER — POLYETHYLENE GLYCOL 3350 17 G/17G
17 POWDER, FOR SOLUTION ORAL DAILY PRN
Status: DISCONTINUED | OUTPATIENT
Start: 2023-01-15 | End: 2023-01-19 | Stop reason: HOSPADM

## 2023-01-15 RX ORDER — SODIUM CHLORIDE 0.9 % (FLUSH) 0.9 %
5-40 SYRINGE (ML) INJECTION AS NEEDED
Status: DISCONTINUED | OUTPATIENT
Start: 2023-01-15 | End: 2023-01-19 | Stop reason: HOSPADM

## 2023-01-15 RX ADMIN — PIPERACILLIN AND TAZOBACTAM 4.5 G: 4; .5 INJECTION, POWDER, FOR SOLUTION INTRAVENOUS at 23:12

## 2023-01-15 RX ADMIN — SODIUM CHLORIDE, POTASSIUM CHLORIDE, SODIUM LACTATE AND CALCIUM CHLORIDE 100 ML/HR: 600; 310; 30; 20 INJECTION, SOLUTION INTRAVENOUS at 23:15

## 2023-01-15 RX ADMIN — SODIUM CHLORIDE 2 G: 9 INJECTION INTRAMUSCULAR; INTRAVENOUS; SUBCUTANEOUS at 15:42

## 2023-01-15 RX ADMIN — Medication 2 UNITS: at 23:12

## 2023-01-15 RX ADMIN — VANCOMYCIN HYDROCHLORIDE 1250 MG: 1.25 INJECTION, POWDER, LYOPHILIZED, FOR SOLUTION INTRAVENOUS at 15:46

## 2023-01-15 NOTE — ED NOTES
TRANSFER - OUT REPORT:    Verbal report given to Flavio Padilla RN (name) on Roderick Gasca being transferred to [facility] ED (unit) for routine progression of care. Report consisted of patients Situation, Background, Assessment and Recommendations(SBAR). Information from the following report(s) SBAR, ED Summary, MAR, Accordion, and Recent Results was reviewed with the receiving nurse. Lines:   Peripheral IV 01/15/23 Anterior;Proximal;Right Forearm (Active)       Peripheral IV 01/15/23 Anterior;Left;Proximal Forearm (Active)        Opportunity for questions and clarification was provided.

## 2023-01-15 NOTE — ED NOTES
Pt's spouse called asking for update on pt's care; this RN stated unable to provide any information at this time. Spouse wanting to know if he needs to take care of dogs and go to her house. Again this RN reported unable to provide any information at this time and then he hung on this RN.

## 2023-01-15 NOTE — ED PROVIDER NOTES
27-year-old male with a history of type 2 diabetes presents with a wound to the left fourth toe that he has noticed over the last several days and has gotten progressively worse. He has noticed an ulcer on the lateral aspect of the left fourth toe. He denies fever but does endorse sharp pain without aggravating or alleviating factors. He does not follow with podiatry and denies history of foot wounds in the past.       Past Medical History:   Diagnosis Date    Diabetes ([facility]        History reviewed. No pertinent surgical history. History reviewed. No pertinent family history. Social History     Socioeconomic History    Marital status: SINGLE     Spouse name: Not on file    Number of children: Not on file    Years of education: Not on file    Highest education level: Not on file   Occupational History    Not on file   Tobacco Use    Smoking status: Every Day     Packs/day: 0.50     Types: Cigarettes    Smokeless tobacco: Never   Substance and Sexual Activity    Alcohol use: Not Currently    Drug use: Never    Sexual activity: Not on file   Other Topics Concern    Not on file   Social History Narrative    Not on file     Social Determinants of Health     Financial Resource Strain: Not on file   Food Insecurity: Not on file   Transportation Needs: Not on file   Physical Activity: Not on file   Stress: Not on file   Social Connections: Not on file   Intimate Partner Violence: Not on file   Housing Stability: Not on file         ALLERGIES: Patient has no known allergies. Review of Systems   Constitutional:  Negative for fever. Skin:  Positive for wound. Vitals:    01/15/23 1353   BP: (!) 180/94   Pulse: (!) 110   Resp: 16   Temp: 98.3 °F (36.8 °C)   SpO2: 100%   Weight: 111.1 kg (245 lb)            Physical Exam  Vitals and nursing note reviewed. Constitutional:       General: He is not in acute distress. Appearance: Normal appearance. He is not ill-appearing, toxic-appearing or diaphoretic. HENT:      Head: Normocephalic. Eyes:      Extraocular Movements: Extraocular movements intact. Cardiovascular:      Rate and Rhythm: Normal rate. Pulmonary:      Effort: Pulmonary effort is normal. No respiratory distress. Abdominal:      General: There is no distension. Musculoskeletal:         General: Normal range of motion. Cervical back: Normal range of motion. Skin:     General: Skin is dry. Capillary Refill: Capillary refill takes less than 2 seconds. Comments: Ulcer on the lateral aspect of the left fourth toe. See picture below. Neurological:      Mental Status: He is alert and oriented to person, place, and time. Psychiatric:         Mood and Affect: Mood normal.                    Medical Decision Making    Patient presents with an ulcerative lesion on the left fourth toe concerning for diabetic foot ulcer and osteomyelitis. Labs were obtained and show normal white blood cell count and hemoglobin. Competence of metabolic panel essentially unremarkable. ESR elevated to 2.36 with an elevated sed rate of 56. Lactic acid level is normal.  X-ray shows findings concerning for osteomyelitis. Patient started on Rocephin and vancomycin for broad-spectrum coverage and will be admitted to hospital medicine for podiatry evaluation. Patient comfortable and agreeable with plan of care.     Total critical care time spent exclusive of procedures:  39 minutes          Perfect Serve Consult for Admission  3:12 PM    ED Room Number: SER04/04  Patient Name and age:  Brien Denton 46 y.o.  male  Working Diagnosis: Osteomyelitis of left foot, unspecified type ([address]  (primary encounter diagnosis)    COVID-19 Suspicion:  no  Sepsis present:  no  Reassessment needed: no  Code Status:  Full Code  Readmission: no  Isolation Requirements:  no  Recommended Level of Care:  telemetry  Department:[facility] - [phone]  Other:  osteo on plain film        Amount and/or Complexity of Data Reviewed  Labs: ordered. Radiology: ordered. Risk  Prescription drug management. Decision regarding hospitalization.            Procedures

## 2023-01-16 ENCOUNTER — ANESTHESIA (OUTPATIENT)
Dept: SURGERY | Age: 53
DRG: 314 | End: 2023-01-16
Payer: COMMERCIAL

## 2023-01-16 ENCOUNTER — ANESTHESIA EVENT (OUTPATIENT)
Dept: SURGERY | Age: 53
DRG: 314 | End: 2023-01-16
Payer: COMMERCIAL

## 2023-01-16 ENCOUNTER — APPOINTMENT (OUTPATIENT)
Dept: GENERAL RADIOLOGY | Age: 53
DRG: 314 | End: 2023-01-16
Attending: INTERNAL MEDICINE
Payer: COMMERCIAL

## 2023-01-16 LAB
ALBUMIN SERPL-MCNC: 2.6 G/DL (ref 3.5–5)
ALBUMIN/GLOB SERPL: 0.8 (ref 1.1–2.2)
ALP SERPL-CCNC: 86 U/L (ref 45–117)
ALT SERPL-CCNC: 18 U/L (ref 12–78)
AMPHET UR QL SCN: NEGATIVE
ANION GAP SERPL CALC-SCNC: 5 MMOL/L (ref 5–15)
APPEARANCE UR: CLEAR
AST SERPL-CCNC: 11 U/L (ref 15–37)
ATRIAL RATE: 85 BPM
BACTERIA URNS QL MICRO: NEGATIVE /HPF
BARBITURATES UR QL SCN: NEGATIVE
BASOPHILS # BLD: 0 K/UL (ref 0–0.1)
BASOPHILS NFR BLD: 1 % (ref 0–1)
BENZODIAZ UR QL: NEGATIVE
BILIRUB SERPL-MCNC: 0.4 MG/DL (ref 0.2–1)
BILIRUB UR QL: NEGATIVE
BUN SERPL-MCNC: 15 MG/DL (ref 6–20)
BUN/CREAT SERPL: 18 (ref 12–20)
CALCIUM SERPL-MCNC: 8.7 MG/DL (ref 8.5–10.1)
CALCULATED P AXIS, ECG09: 66 DEGREES
CALCULATED R AXIS, ECG10: 66 DEGREES
CALCULATED T AXIS, ECG11: 72 DEGREES
CANNABINOIDS UR QL SCN: NEGATIVE
CHLORIDE SERPL-SCNC: 105 MMOL/L (ref 97–108)
CO2 SERPL-SCNC: 28 MMOL/L (ref 21–32)
COCAINE UR QL SCN: POSITIVE
COLOR UR: ABNORMAL
CREAT SERPL-MCNC: 0.84 MG/DL (ref 0.7–1.3)
DIAGNOSIS, 93000: NORMAL
DIFFERENTIAL METHOD BLD: NORMAL
DRUG SCRN COMMENT,DRGCM: ABNORMAL
EOSINOPHIL # BLD: 0.4 K/UL (ref 0–0.4)
EOSINOPHIL NFR BLD: 5 % (ref 0–7)
EPITH CASTS URNS QL MICRO: ABNORMAL /LPF
ERYTHROCYTE [DISTWIDTH] IN BLOOD BY AUTOMATED COUNT: 12.6 % (ref 11.5–14.5)
EST. AVERAGE GLUCOSE BLD GHB EST-MCNC: 197 MG/DL
GLOBULIN SER CALC-MCNC: 3.4 G/DL (ref 2–4)
GLUCOSE BLD STRIP.AUTO-MCNC: 131 MG/DL (ref 65–117)
GLUCOSE BLD STRIP.AUTO-MCNC: 169 MG/DL (ref 65–117)
GLUCOSE BLD STRIP.AUTO-MCNC: 264 MG/DL (ref 65–117)
GLUCOSE BLD STRIP.AUTO-MCNC: 98 MG/DL (ref 65–117)
GLUCOSE SERPL-MCNC: 210 MG/DL (ref 65–100)
GLUCOSE UR STRIP.AUTO-MCNC: NEGATIVE MG/DL
HBA1C MFR BLD: 8.5 % (ref 4–5.6)
HCT VFR BLD AUTO: 38.6 % (ref 36.6–50.3)
HGB BLD-MCNC: 12.5 G/DL (ref 12.1–17)
HGB UR QL STRIP: NEGATIVE
HYALINE CASTS URNS QL MICRO: ABNORMAL /LPF (ref 0–5)
IMM GRANULOCYTES # BLD AUTO: 0 K/UL (ref 0–0.04)
IMM GRANULOCYTES NFR BLD AUTO: 0 % (ref 0–0.5)
KETONES UR QL STRIP.AUTO: NEGATIVE MG/DL
LEUKOCYTE ESTERASE UR QL STRIP.AUTO: NEGATIVE
LYMPHOCYTES # BLD: 1.8 K/UL (ref 0.8–3.5)
LYMPHOCYTES NFR BLD: 21 % (ref 12–49)
MAGNESIUM SERPL-MCNC: 1.6 MG/DL (ref 1.6–2.4)
MCH RBC QN AUTO: 28.9 PG (ref 26–34)
MCHC RBC AUTO-ENTMCNC: 32.4 G/DL (ref 30–36.5)
MCV RBC AUTO: 89.4 FL (ref 80–99)
METHADONE UR QL: NEGATIVE
MONOCYTES # BLD: 0.9 K/UL (ref 0–1)
MONOCYTES NFR BLD: 10 % (ref 5–13)
NEUTS SEG # BLD: 5.3 K/UL (ref 1.8–8)
NEUTS SEG NFR BLD: 63 % (ref 32–75)
NITRITE UR QL STRIP.AUTO: NEGATIVE
NRBC # BLD: 0 K/UL (ref 0–0.01)
NRBC BLD-RTO: 0 PER 100 WBC
OPIATES UR QL: NEGATIVE
P-R INTERVAL, ECG05: 164 MS
PCP UR QL: NEGATIVE
PH UR STRIP: 6.5 (ref 5–8)
PHOSPHATE SERPL-MCNC: 3.7 MG/DL (ref 2.6–4.7)
PLATELET # BLD AUTO: 278 K/UL (ref 150–400)
PMV BLD AUTO: 10.6 FL (ref 8.9–12.9)
POTASSIUM SERPL-SCNC: 4.4 MMOL/L (ref 3.5–5.1)
PROT SERPL-MCNC: 6 G/DL (ref 6.4–8.2)
PROT UR STRIP-MCNC: 30 MG/DL
Q-T INTERVAL, ECG07: 380 MS
QRS DURATION, ECG06: 68 MS
QTC CALCULATION (BEZET), ECG08: 452 MS
RBC # BLD AUTO: 4.32 M/UL (ref 4.1–5.7)
RBC #/AREA URNS HPF: ABNORMAL /HPF (ref 0–5)
SERVICE CMNT-IMP: ABNORMAL
SERVICE CMNT-IMP: NORMAL
SODIUM SERPL-SCNC: 138 MMOL/L (ref 136–145)
SP GR UR REFRACTOMETRY: 1.01 (ref 1–1.03)
TSH SERPL DL<=0.05 MIU/L-ACNC: 1.19 UIU/ML (ref 0.36–3.74)
UROBILINOGEN UR QL STRIP.AUTO: 0.2 EU/DL (ref 0.2–1)
VENTRICULAR RATE, ECG03: 85 BPM
WBC # BLD AUTO: 8.4 K/UL (ref 4.1–11.1)
WBC URNS QL MICRO: ABNORMAL /HPF (ref 0–4)

## 2023-01-16 PROCEDURE — 87077 CULTURE AEROBIC IDENTIFY: CPT

## 2023-01-16 PROCEDURE — 87186 SC STD MICRODIL/AGAR DIL: CPT

## 2023-01-16 PROCEDURE — 93005 ELECTROCARDIOGRAM TRACING: CPT

## 2023-01-16 PROCEDURE — 80053 COMPREHEN METABOLIC PANEL: CPT

## 2023-01-16 PROCEDURE — 74011250637 HC RX REV CODE- 250/637: Performed by: INTERNAL MEDICINE

## 2023-01-16 PROCEDURE — 82962 GLUCOSE BLOOD TEST: CPT

## 2023-01-16 PROCEDURE — 0Y6W0Z0 DETACHMENT AT LEFT 4TH TOE, COMPLETE, OPEN APPROACH: ICD-10-PCS | Performed by: PODIATRIST

## 2023-01-16 PROCEDURE — 84443 ASSAY THYROID STIM HORMONE: CPT

## 2023-01-16 PROCEDURE — 76210000006 HC OR PH I REC 0.5 TO 1 HR: Performed by: PODIATRIST

## 2023-01-16 PROCEDURE — 88311 DECALCIFY TISSUE: CPT

## 2023-01-16 PROCEDURE — 84100 ASSAY OF PHOSPHORUS: CPT

## 2023-01-16 PROCEDURE — 83735 ASSAY OF MAGNESIUM: CPT

## 2023-01-16 PROCEDURE — 74011000250 HC RX REV CODE- 250: Performed by: NURSE ANESTHETIST, CERTIFIED REGISTERED

## 2023-01-16 PROCEDURE — 74011000250 HC RX REV CODE- 250: Performed by: INTERNAL MEDICINE

## 2023-01-16 PROCEDURE — 65270000032 HC RM SEMIPRIVATE

## 2023-01-16 PROCEDURE — 87205 SMEAR GRAM STAIN: CPT

## 2023-01-16 PROCEDURE — 94760 N-INVAS EAR/PLS OXIMETRY 1: CPT

## 2023-01-16 PROCEDURE — 85025 COMPLETE CBC W/AUTO DIFF WBC: CPT

## 2023-01-16 PROCEDURE — 74011636637 HC RX REV CODE- 636/637: Performed by: INTERNAL MEDICINE

## 2023-01-16 PROCEDURE — 83036 HEMOGLOBIN GLYCOSYLATED A1C: CPT

## 2023-01-16 PROCEDURE — 71045 X-RAY EXAM CHEST 1 VIEW: CPT

## 2023-01-16 PROCEDURE — 36415 COLL VENOUS BLD VENIPUNCTURE: CPT

## 2023-01-16 PROCEDURE — 77030002916 HC SUT ETHLN J&J -A: Performed by: PODIATRIST

## 2023-01-16 PROCEDURE — 74011250636 HC RX REV CODE- 250/636: Performed by: NURSE ANESTHETIST, CERTIFIED REGISTERED

## 2023-01-16 PROCEDURE — 74011000258 HC RX REV CODE- 258: Performed by: PODIATRIST

## 2023-01-16 PROCEDURE — 2709999900 HC NON-CHARGEABLE SUPPLY: Performed by: PODIATRIST

## 2023-01-16 PROCEDURE — 74011250637 HC RX REV CODE- 250/637: Performed by: PODIATRIST

## 2023-01-16 PROCEDURE — 76010000138 HC OR TIME 0.5 TO 1 HR: Performed by: PODIATRIST

## 2023-01-16 PROCEDURE — 88305 TISSUE EXAM BY PATHOLOGIST: CPT

## 2023-01-16 PROCEDURE — 74011000258 HC RX REV CODE- 258: Performed by: INTERNAL MEDICINE

## 2023-01-16 PROCEDURE — 81001 URINALYSIS AUTO W/SCOPE: CPT

## 2023-01-16 PROCEDURE — 74011250636 HC RX REV CODE- 250/636: Performed by: INTERNAL MEDICINE

## 2023-01-16 PROCEDURE — 74011000250 HC RX REV CODE- 250: Performed by: PODIATRIST

## 2023-01-16 PROCEDURE — 76060000032 HC ANESTHESIA 0.5 TO 1 HR: Performed by: PODIATRIST

## 2023-01-16 PROCEDURE — 87075 CULTR BACTERIA EXCEPT BLOOD: CPT

## 2023-01-16 PROCEDURE — 80307 DRUG TEST PRSMV CHEM ANLYZR: CPT

## 2023-01-16 RX ORDER — DEXTROSE, SODIUM CHLORIDE, SODIUM LACTATE, POTASSIUM CHLORIDE, AND CALCIUM CHLORIDE 5; .6; .31; .03; .02 G/100ML; G/100ML; G/100ML; G/100ML; G/100ML
125 INJECTION, SOLUTION INTRAVENOUS CONTINUOUS
Status: DISCONTINUED | OUTPATIENT
Start: 2023-01-16 | End: 2023-01-16 | Stop reason: HOSPADM

## 2023-01-16 RX ORDER — SODIUM CHLORIDE 0.9 % (FLUSH) 0.9 %
5-40 SYRINGE (ML) INJECTION AS NEEDED
Status: DISCONTINUED | OUTPATIENT
Start: 2023-01-16 | End: 2023-01-16 | Stop reason: HOSPADM

## 2023-01-16 RX ORDER — FENTANYL CITRATE 50 UG/ML
25 INJECTION, SOLUTION INTRAMUSCULAR; INTRAVENOUS
Status: DISCONTINUED | OUTPATIENT
Start: 2023-01-16 | End: 2023-01-16 | Stop reason: HOSPADM

## 2023-01-16 RX ORDER — ENOXAPARIN SODIUM 100 MG/ML
30 INJECTION SUBCUTANEOUS EVERY 12 HOURS
Status: DISCONTINUED | OUTPATIENT
Start: 2023-01-16 | End: 2023-01-19 | Stop reason: HOSPADM

## 2023-01-16 RX ORDER — MIDAZOLAM HYDROCHLORIDE 1 MG/ML
1 INJECTION, SOLUTION INTRAMUSCULAR; INTRAVENOUS AS NEEDED
Status: DISCONTINUED | OUTPATIENT
Start: 2023-01-16 | End: 2023-01-16 | Stop reason: HOSPADM

## 2023-01-16 RX ORDER — ONDANSETRON 2 MG/ML
INJECTION INTRAMUSCULAR; INTRAVENOUS AS NEEDED
Status: DISCONTINUED | OUTPATIENT
Start: 2023-01-16 | End: 2023-01-16 | Stop reason: HOSPADM

## 2023-01-16 RX ORDER — HYDRALAZINE HYDROCHLORIDE 20 MG/ML
10 INJECTION INTRAMUSCULAR; INTRAVENOUS
Status: DISCONTINUED | OUTPATIENT
Start: 2023-01-16 | End: 2023-01-19 | Stop reason: HOSPADM

## 2023-01-16 RX ORDER — LIDOCAINE HYDROCHLORIDE 10 MG/ML
0.5 INJECTION, SOLUTION EPIDURAL; INFILTRATION; INTRACAUDAL; PERINEURAL AS NEEDED
Status: DISCONTINUED | OUTPATIENT
Start: 2023-01-16 | End: 2023-01-16 | Stop reason: HOSPADM

## 2023-01-16 RX ORDER — SODIUM CHLORIDE 0.9 % (FLUSH) 0.9 %
5-40 SYRINGE (ML) INJECTION EVERY 8 HOURS
Status: DISCONTINUED | OUTPATIENT
Start: 2023-01-16 | End: 2023-01-16 | Stop reason: HOSPADM

## 2023-01-16 RX ORDER — FENTANYL CITRATE 50 UG/ML
50 INJECTION, SOLUTION INTRAMUSCULAR; INTRAVENOUS AS NEEDED
Status: DISCONTINUED | OUTPATIENT
Start: 2023-01-16 | End: 2023-01-16 | Stop reason: HOSPADM

## 2023-01-16 RX ORDER — FENTANYL CITRATE 50 UG/ML
INJECTION, SOLUTION INTRAMUSCULAR; INTRAVENOUS AS NEEDED
Status: DISCONTINUED | OUTPATIENT
Start: 2023-01-16 | End: 2023-01-16 | Stop reason: HOSPADM

## 2023-01-16 RX ORDER — ACETAMINOPHEN 325 MG/1
650 TABLET ORAL ONCE
Status: DISCONTINUED | OUTPATIENT
Start: 2023-01-16 | End: 2023-01-16 | Stop reason: HOSPADM

## 2023-01-16 RX ORDER — PROPOFOL 10 MG/ML
INJECTION, EMULSION INTRAVENOUS
Status: DISCONTINUED | OUTPATIENT
Start: 2023-01-16 | End: 2023-01-16 | Stop reason: HOSPADM

## 2023-01-16 RX ORDER — MIDAZOLAM HYDROCHLORIDE 1 MG/ML
1 INJECTION, SOLUTION INTRAMUSCULAR; INTRAVENOUS
Status: DISCONTINUED | OUTPATIENT
Start: 2023-01-16 | End: 2023-01-16 | Stop reason: HOSPADM

## 2023-01-16 RX ORDER — ROPIVACAINE HYDROCHLORIDE 5 MG/ML
30 INJECTION, SOLUTION EPIDURAL; INFILTRATION; PERINEURAL AS NEEDED
Status: DISCONTINUED | OUTPATIENT
Start: 2023-01-16 | End: 2023-01-16 | Stop reason: HOSPADM

## 2023-01-16 RX ORDER — HYDROCODONE BITARTRATE AND ACETAMINOPHEN 5; 325 MG/1; MG/1
1 TABLET ORAL
Status: DISCONTINUED | OUTPATIENT
Start: 2023-01-16 | End: 2023-01-19 | Stop reason: HOSPADM

## 2023-01-16 RX ORDER — MORPHINE SULFATE 2 MG/ML
2 INJECTION, SOLUTION INTRAMUSCULAR; INTRAVENOUS
Status: DISCONTINUED | OUTPATIENT
Start: 2023-01-16 | End: 2023-01-16 | Stop reason: HOSPADM

## 2023-01-16 RX ORDER — LIDOCAINE HYDROCHLORIDE 20 MG/ML
INJECTION, SOLUTION EPIDURAL; INFILTRATION; INTRACAUDAL; PERINEURAL AS NEEDED
Status: DISCONTINUED | OUTPATIENT
Start: 2023-01-16 | End: 2023-01-16 | Stop reason: HOSPADM

## 2023-01-16 RX ORDER — SODIUM CHLORIDE, SODIUM LACTATE, POTASSIUM CHLORIDE, CALCIUM CHLORIDE 600; 310; 30; 20 MG/100ML; MG/100ML; MG/100ML; MG/100ML
125 INJECTION, SOLUTION INTRAVENOUS CONTINUOUS
Status: DISCONTINUED | OUTPATIENT
Start: 2023-01-16 | End: 2023-01-16 | Stop reason: HOSPADM

## 2023-01-16 RX ORDER — OXYCODONE HYDROCHLORIDE 5 MG/1
5 TABLET ORAL AS NEEDED
Status: DISCONTINUED | OUTPATIENT
Start: 2023-01-16 | End: 2023-01-16 | Stop reason: HOSPADM

## 2023-01-16 RX ORDER — ONDANSETRON 2 MG/ML
4 INJECTION INTRAMUSCULAR; INTRAVENOUS AS NEEDED
Status: DISCONTINUED | OUTPATIENT
Start: 2023-01-16 | End: 2023-01-16 | Stop reason: HOSPADM

## 2023-01-16 RX ORDER — MIDAZOLAM HYDROCHLORIDE 1 MG/ML
INJECTION, SOLUTION INTRAMUSCULAR; INTRAVENOUS AS NEEDED
Status: DISCONTINUED | OUTPATIENT
Start: 2023-01-16 | End: 2023-01-16 | Stop reason: HOSPADM

## 2023-01-16 RX ORDER — DIPHENHYDRAMINE HYDROCHLORIDE 50 MG/ML
12.5 INJECTION, SOLUTION INTRAMUSCULAR; INTRAVENOUS AS NEEDED
Status: DISCONTINUED | OUTPATIENT
Start: 2023-01-16 | End: 2023-01-16 | Stop reason: HOSPADM

## 2023-01-16 RX ADMIN — LIDOCAINE HYDROCHLORIDE 40 MG: 20 INJECTION, SOLUTION EPIDURAL; INFILTRATION; INTRACAUDAL; PERINEURAL at 16:43

## 2023-01-16 RX ADMIN — ENOXAPARIN SODIUM 30 MG: 100 INJECTION SUBCUTANEOUS at 21:14

## 2023-01-16 RX ADMIN — ACETAMINOPHEN 650 MG: 325 TABLET ORAL at 00:30

## 2023-01-16 RX ADMIN — PIPERACILLIN AND TAZOBACTAM 3.38 G: 3; .375 INJECTION, POWDER, FOR SOLUTION INTRAVENOUS at 21:14

## 2023-01-16 RX ADMIN — INSULIN GLARGINE 10 UNITS: 100 INJECTION, SOLUTION SUBCUTANEOUS at 09:18

## 2023-01-16 RX ADMIN — PIPERACILLIN AND TAZOBACTAM 3.38 G: 3; .375 INJECTION, POWDER, FOR SOLUTION INTRAVENOUS at 13:20

## 2023-01-16 RX ADMIN — ACETAMINOPHEN 650 MG: 325 TABLET ORAL at 15:24

## 2023-01-16 RX ADMIN — PROPOFOL 100 MCG/KG/MIN: 10 INJECTION, EMULSION INTRAVENOUS at 16:44

## 2023-01-16 RX ADMIN — FENTANYL CITRATE 25 MCG: 50 INJECTION, SOLUTION INTRAMUSCULAR; INTRAVENOUS at 16:48

## 2023-01-16 RX ADMIN — MIDAZOLAM 2 MG: 1 INJECTION INTRAMUSCULAR; INTRAVENOUS at 16:34

## 2023-01-16 RX ADMIN — VANCOMYCIN HYDROCHLORIDE 1250 MG: 1.25 INJECTION, POWDER, LYOPHILIZED, FOR SOLUTION INTRAVENOUS at 03:25

## 2023-01-16 RX ADMIN — PIPERACILLIN AND TAZOBACTAM 3.38 G: 3; .375 INJECTION, POWDER, FOR SOLUTION INTRAVENOUS at 06:22

## 2023-01-16 RX ADMIN — VANCOMYCIN HYDROCHLORIDE 1250 MG: 1.25 INJECTION, POWDER, LYOPHILIZED, FOR SOLUTION INTRAVENOUS at 15:50

## 2023-01-16 RX ADMIN — SODIUM CHLORIDE, PRESERVATIVE FREE 10 ML: 5 INJECTION INTRAVENOUS at 13:22

## 2023-01-16 RX ADMIN — PROPOFOL 50 MG: 10 INJECTION, EMULSION INTRAVENOUS at 16:47

## 2023-01-16 RX ADMIN — FENTANYL CITRATE 25 MCG: 50 INJECTION, SOLUTION INTRAMUSCULAR; INTRAVENOUS at 16:44

## 2023-01-16 RX ADMIN — ONDANSETRON HYDROCHLORIDE 4 MG: 2 INJECTION, SOLUTION INTRAMUSCULAR; INTRAVENOUS at 17:24

## 2023-01-16 RX ADMIN — SODIUM CHLORIDE, POTASSIUM CHLORIDE, SODIUM LACTATE AND CALCIUM CHLORIDE 100 ML/HR: 600; 310; 30; 20 INJECTION, SOLUTION INTRAVENOUS at 09:23

## 2023-01-16 RX ADMIN — Medication 3 UNITS: at 21:54

## 2023-01-16 RX ADMIN — Medication 2 UNITS: at 09:16

## 2023-01-16 RX ADMIN — HYDROCODONE BITARTRATE AND ACETAMINOPHEN 1 TABLET: 5; 325 TABLET ORAL at 21:14

## 2023-01-16 NOTE — PERIOP NOTES
TRANSFER - OUT REPORT:    Verbal report given to Alex Drake RN (name) on Wilmer Diaz  being transferred to [address] [place] [address] (unit) for routine post - op       Report consisted of patients Situation, Background, Assessment and   Recommendations(SBAR). Information from the following report(s) SBAR, OR Summary, Procedure Summary, MAR, and Recent Results was reviewed with the receiving nurse. Lines:   Peripheral IV 01/15/23 Anterior;Left;Proximal Forearm (Active)   Site Assessment Clean, dry, & intact 01/16/23 1737   Phlebitis Assessment 0 01/16/23 1737   Infiltration Assessment 0 01/16/23 1737   Dressing Status Clean, dry, & intact 01/16/23 1737   Dressing Type Transparent;Tape 01/16/23 0911   Hub Color/Line Status Infusing 01/16/23 1737   Action Taken Open ports on tubing capped 01/16/23 1737   Alcohol Cap Used Yes 01/16/23 1737        Opportunity for questions and clarification was provided.

## 2023-01-16 NOTE — PROGRESS NOTES
Transition of Care Plan  RUR: 6%  DISPOSITION: The disposition plan is home with family assistance  F/U with PCP/Specialist    Transport: family         Reason for Admission:  osteomyelitis                      RUR Score:   6%                  Plan for utilizing home health:      not recommended     PCP: First and Last name:  Mark Freed MD     Name of Practice:    Are you a current patient: Yes/No:    Approximate date of last visit:    Can you participate in a virtual visit with your PCP:                     Current Advanced Directive/Advance Care Plan: Full Code      Healthcare Decision Maker:   Click here to complete [address] including selection of the Healthcare Decision Maker Relationship (ie \"Primary\")                             Transition of Care Plan:                      Patient is independent in his ADLs/IADLs and patient uses [facility]. Care Management Interventions  PCP Verified by CM: Yes  Palliative Care Criteria Met (RRAT>21 & CHF Dx)?: No  Mode of Transport at Discharge:  Other (see comment) (family)  Transition of Care Consult (CM Consult): Discharge Planning  MyChart Signup: No  Discharge Durable Medical Equipment: No  Health Maintenance Reviewed: Yes  Physical Therapy Consult: No  Occupational Therapy Consult: No  Speech Therapy Consult: No  Support Systems: Other Family Member(s)  Confirm Follow Up Transport: Self  [place] Resource Information Provided?: No  Discharge Location  Patient Expects to be Discharged to[de-identified] Home with family assistance    12:11 PM  TORY Calvo

## 2023-01-16 NOTE — PROGRESS NOTES
Problem: Impaired Skin Integrity/Pressure Injury Treatment  Goal: *Improvement of Existing Pressure Injury  Outcome: Progressing Towards Goal  Goal: *Prevention of pressure injury  Description: Document Karthik Scale and appropriate interventions in the flowsheet.   Outcome: Progressing Towards Goal  Note: Pressure Injury Interventions:                                            Problem: Patient Education: Go to Patient Education Activity  Goal: Patient/Family Education  Outcome: Progressing Towards Goal

## 2023-01-16 NOTE — H&P
[address]  HISTORY AND PHYSICAL    Name:  Kwabena Handley  MR#:  [id number]  :  1970  ACCOUNT #:  [de-identified]  ADMIT DATE:  01/15/2023      The patient was seen, evaluated and admitted by me on 01/15/2023. PRIMARY CARE PHYSICIAN:  Danii Das MD    SOURCE OF INFORMATION:  The patient and review of ED electronic medical records. CHIEF COMPLAINT:  Pain and swelling, left fourth toe. HISTORY OF PRESENT ILLNESS:  This is a 51-year-old man with past medical history significant for type 2 diabetes, presented at the [facility] emergency room at [place] with pain and swelling involving the left fourth toe. This started several days ago and progressively getting worse. The patient noticed ulceration involving the left fourth toe as well. The patient denies fever, rigors, or chills. No history of trauma to the left foot. The pain is constant, sharp pain with no known aggravating or relieving factors. When the patient arrived at the emergency room, x-ray of the left foot was obtained. The x-rays shows findings concerning for osteomyelitis of the fourth digit. The patient was referred to the hospitalist service for evaluation for admission. No record of prior admission to this hospital.    PAST MEDICAL HISTORY:  Type 2 diabetes. ALLERGIES:  NO KNOWN DRUG ALLERGIES. MEDICATIONS:  1. Lantus insulin 10 units subcutaneously daily. 2.  Glucophage 1000 mg twice daily. 3.  Victoza, dosage as direct. FAMILY HISTORY:  This was reviewed. His father had diabetes. PAST SURGICAL HISTORY:  Not significant. SOCIAL HISTORY:  The patient smokes half a pack of cigarettes daily. Denies alcohol abuse. REVIEW OF SYSTEMS:  HEAD, EYES, EARS, NOSE AND THROAT:  No headache, no dizziness, no blurring of vision, no photophobia. RESPIRATORY SYSTEM:  No cough, no shortness of breath, no hemoptysis.   CARDIOVASCULAR SYSTEM:  No chest pain, no orthopnea, no palpitations. GASTROINTESTINAL SYSTEM:  No nausea or vomiting. No diarrhea, no constipation. GENITOURINARY SYSTEM:  No dysuria, no urgency, no frequency. All other systems are reviewed and they are negative. PHYSICAL EXAMINATION:  GENERAL APPEARANCE:  The patient appeared ill, in moderate distress. VITAL SIGNS:  On arrival at the emergency room; temperature 98.3, pulse 110, respiratory rate 16, blood pressure 180/94, oxygen saturation 100%. HEENT:  Head:  Normocephalic, atraumatic. Eyes:  Normal eye movement. No redness, no drainage, no discharge. Ears:  Normal external ears with no evidence of drainage. Nose:  No deformity, no drainage. Mouth and Throat:  No visible oral lesion. NECK:  Neck is supple. No JVD. No thyromegaly. CHEST:  Clear breath sounds. No wheezing, no crackles. HEART:  Normal S1 and S2, regular. No clinically appreciable murmur. ABDOMEN:  Soft, nontender. Normal bowel sounds. CNS:  Alert and oriented x3. No gross focal neurological deficit. EXTREMITIES:  No edema. Pulses 2+ bilaterally. MUSCULOSKELETAL SYSTEM:  No obvious joint deformity or swelling. SKIN:  Ulceration of the left fourth digit with surrounding swelling and erythema noted and present on admission. PSYCHIATRY:  Normal mood and affect. LYMPHATIC SYSTEM:  No cervical lymphadenopathy. DIAGNOSTIC DATA:  X-ray of the left foot shows findings concerning for osteomyelitis of the fourth digit. LABORATORY DATA:  Hematology:  WBC 10.2, hemoglobin 13.6, hematocrit 41.8, platelets 654. C-reactive protein level 2.36. Chemistry:  Sodium 141, potassium 4.1, chloride 104, CO2 of 32, glucose 218, BUN 13, creatinine 0.93, calcium 9.2, total bilirubin 0.4, ALT 20, AST 14, alkaline phosphatase 96, total protein 7.7, albumin level 3.1, globulin 4.6. Lactic acid level 0.9. Sed rate 56. Urinalysis: This is significant for negative nitrite, negative leukocyte esterase, negative bacteria. ASSESSMENT:  1. Osteomyelitis of left fourth digit. 2.  Type 2 diabetes with hyperglycemia. 3.  Elevated blood pressure. 4.  Tobacco abuse. PLAN  1. Osteomyelitis of left fourth digit: We will admit the patient for further evaluation and treatment. We will start the patient on vancomycin and Zosyn. Podiatrist consult will be requested to assist in further evaluation and treatment. The patient will be n.p.o in anticipation of intervention by the podiatrist.  [address] consult will also be requested. We will carry out other supportive treatment including fluid therapy and pain control. 2.  Type 2 diabetes with hyperglycemia: We will start the patient on sliding scale with insulin coverage. We will check hemoglobin A1c level if one has not been checked recently. We will hold Glucophage till the time of discharge from the hospital.  We will also resume home basal insulin. 3.  Elevated blood pressure: The patient denies history of hypertension. We will check a TSH level. We will check urine drug screen. We will obtain chest x-ray. We will also obtain EKG. We will start the patient on hydralazine as needed. The patient will require antihypertensive medication at the time of discharge to home, preferably ACE inhibitor because of the patient's type 2 diabetes for new onset essential hypertension if the patient's average blood pressure reading remains elevated. 4.  Tobacco abuse: The patient advised to quit smoking. We will place the patient on Nicoderm patch. OTHER ISSUES:  Code status: The patient is a full code. We will place the patient on Lovenox for DVT prophylaxis. FUNCTIONAL STATUS PRIOR TO ADMISSION:  The patient came from home. The patient is ambulatory with no assistive device. COVID PRECAUTION:  The patient was wearing a face mask. I was wearing a face mask and gloves for this patient's encounter.         MD MAYUR Teague/[email or web address]/  D:  01/16/2023 3:20  T:  01/16/2023 4:16  JOB #:  [id number]  CC:  Patricio Hare MD

## 2023-01-16 NOTE — PROGRESS NOTES
Lovenox Monitoring  Indication: DVT Prophylaxis  Recent Labs     01/16/23  0513 01/15/23  1531   HGB 12.5 13.6    323   CREA 0.84 0.93     Current Weight: 111.1 kg  Est. CrCl = >100 ml/min  Current Dose: 40 mg subcutaneously every 24 hours. Plan: Change to enoxaparin 30 mg subcutaneously every 12 hours per [facility] P&T Committee Protocol with respect to patient weight (101-150.9). Pharmacy will continue to monitor patient daily and will make dosage adjustments based upon changing weight and renal function.

## 2023-01-16 NOTE — ANESTHESIA POSTPROCEDURE EVALUATION
Procedure(s):  LEFT 4TH TOE AMPUTATION. MAC    Anesthesia Post Evaluation        Patient location during evaluation: PACU  Patient participation: complete - patient participated  Level of consciousness: awake and alert  Pain management: adequate  Airway patency: patent  Anesthetic complications: no  Cardiovascular status: acceptable  Respiratory status: acceptable  Hydration status: acceptable  Comments: I have seen and evaluated the patient and is ready for discharge. Otf Taylor MD    Post anesthesia nausea and vomiting:  none      INITIAL Post-op Vital signs:   Vitals Value Taken Time   /102 01/16/23 1818   Temp 36.9 °C (98.4 °F) 01/16/23 1737   Pulse 91 01/16/23 1818   Resp 12 01/16/23 1818   SpO2 95 % 01/16/23 1818   Vitals shown include unvalidated device data.

## 2023-01-16 NOTE — BRIEF OP NOTE
Brief Postoperative Note    Patient: Carson Wang  YOB: 1970  MRN: [id number]    Date of Procedure: 1/16/2023     Pre-Op Diagnosis: Left fourth toe osteomyelitis    Post-Op Diagnosis: Same as preoperative diagnosis. Procedure(s):  LEFT 4TH TOE AMPUTATION    Surgeon(s):  Bernabe Richards DPM    Surgical Assistant: None    Anesthesia: MAC     Estimated Blood Loss (mL): Minimal    Complications: None    Specimens:   ID Type Source Tests Collected by Time Destination   1 : left fourth toe Fresh Toe  Benito Porter 1/16/2023 1710 Pathology   1 : left fouirth toe deep culture Toe Toe ANAEROBIC/AEROBIC/GRAM STAIN Bernabe Richards DPM 1/16/2023 1710 Microbiology        Implants: * No implants in log *    Drains: * No LDAs found *    Findings: Disarticulation at level of MPJ. Expected surgical cure. Degenerative changes at proximal phalanx head.     Electronically Signed by Solange Sloan DPM on 1/16/2023 at 5:33 PM

## 2023-01-16 NOTE — PROGRESS NOTES
Day #1 of Zosyn  Indication:  SSTI  Current regimen:  3.375 gm q6h  Abx regimen: Vanc + Zosyn  Recent Labs     01/15/23  1531   WBC 10.2   CREA 0.93   BUN 13     Est CrCl: >100 ml/min  Temp (24hrs), Av.9 °F (37.2 °C), Min:98.3 °F (36.8 °C), Max:99.8 °F (37.7 °C)    Plan: Change to 4.5 gm x 1 followed by 3.375 gm q8h

## 2023-01-16 NOTE — CONSULTS
Full consult to follow. Patient seen at bedside. Left fourth toe amputation recommended. Patient agreeable to plan. Plan for procedure later this afternoon.

## 2023-01-16 NOTE — PROGRESS NOTES
Pharmacist Note - Vancomycin Dosing    Consult provided for this 46 y.o. male for indication of osteomyelitis (L  4th toe)    Recent Labs     01/15/23  1531   WBC 10.2   CREA 0.93   BUN 13     Frequency of BMP: daily x 3  Height: 75 in (per 's license)  Weight: 537 kg  Est CrCl: >50 ml/min   Temp (24hrs), Av.9 °F (37.2 °C), Min:98.3 °F (36.8 °C), Max:99.8 °F (37.7 °C)    The plan below is expected to result in a target range of AUC/MARY 400-600    Therapy will be initiated with a loading dose of 2500 mg IV x 1 to be followed by a maintenance dose of 1250 mg IV every 12 hours. Pharmacy to follow patient daily and order levels / make dose adjustments as appropriate. *Vancomycin has been dosed used Bayesian kinetics software to target an AUC/MARY of 400-600, which provides adequate exposure for an assumed infection due to MRSA with an MARY of 1 or less while reducing the risk of nephrotoxicity as seen with traditional trough based dosing goals.

## 2023-01-16 NOTE — CONSULTS
Podiatry Consult    Subjective:         Date of Consultation: January 16, 2023     Patient is a 46 y.o.  male who is being seen for osteomyelitis of the left 4th toe. He relates that the issue has been ongoing for the past four days and has been getting progressively worse. He is unsure exactly how it started. He notes that the pain in his foot is making it difficult for him to place weight on his foot. He notes that his aunt has had an amputation due to diabetes. Has not seen a podiatrist in the past.    Patient Active Problem List    Diagnosis Date Noted    O[facility]omyel[facility] ([facility] 01/15/2023     Past Medical History:   Diagnosis Date    Diabetes ([facility] 75.)       History reviewed. No pertinent surgical history. History reviewed. No pertinent family history.    Social History     Tobacco Use    Smoking status: Every Day     Packs/day: 0.50     Types: Cigarettes    Smokeless tobacco: Never   Substance Use Topics    Alcohol use: Not Currently     Current Facility-Administered Medications   Medication Dose Route Frequency Provider Last Rate Last Admin    hydrALAZINE (APRESOLINE) 20 mg/mL injection 10 mg  10 mg IntraVENous Q6H PRN Christelle Molina MD        [START ON 1/17/2023] Vanc level due 1/17 @ 04:00   Other ONCE Christelle Molina MD        enoxaparin (LOVENOX) injection 30 mg  30 mg SubCUTAneous Q12H Ata ROQUE MD        insulin glargine (LANTUS) injection 10 Units  10 Units SubCUTAneous DAILY Christelle Molina MD   10 Units at 01/16/23 0918    sodium chloride (NS) flush 5-40 mL  5-40 mL IntraVENous Q8H Christelle Molina MD        sodium chloride (NS) flush 5-40 mL  5-40 mL IntraVENous PRN Christelle Molina MD        acetaminophen (TYLENOL) tablet 650 mg  650 mg Oral Q6H PRN Christelle Molina MD   650 mg at 01/16/23 0030    Or    acetaminophen (TYLENOL) suppository 650 mg  650 mg Rectal Q6H PRN Christelle Molina MD        polyethylene glycol (MIRALAX) packet 17 g  17 g Oral DAILY PRN Richard Guerrero MD        ondansetron (ZOFRAN ODT) tablet 4 mg  4 mg Oral Q8H PRN Christelle Molina MD        Or    ondansetron (ZOFRAN) injection 4 mg  4 mg IntraVENous Q6H PRN Christelle Molina MD        insulin lispro (HUMALOG) injection   SubCUTAneous AC&HS Christelle Molina MD   2 Units at 23 0916    glucose chewable tablet 16 g  4 Tablet Oral PRN Christelle Molina MD        glucagon (GLUCAGEN) injection 1 mg  1 mg IntraMUSCular PRN Christelle Molina MD        dextrose 10 % infusion 0-250 mL  0-250 mL IntraVENous PRN Christelle Molina MD        oxyCODONE-acetaminophen (PERCOCET) 5-325 mg per tablet 1 Tablet  1 Tablet Oral Q4H PRN Christelle Molina MD        morphine injection 2 mg  2 mg IntraVENous Q4H PRN Christelle Molina MD        lactated Ringers infusion  100 mL/hr IntraVENous CONTINUOUS Christelle Molina  mL/hr at 23 0923 100 mL/hr at 23 0923    Vancomycin- pharmacy to dose   Other Rx Dosing/Monitoring Christelle Molina MD        vancomycin (VANCOCIN) 1,250 mg in 0.9% sodium chloride 250 mL (Acme9Mic)  1,250 mg IntraVENous Q12H Christelle Molina MD   1,250 mg at 23 0325    piperacillin-tazobactam (ZOSYN) 3.375 g in 0.9% sodium chloride (MBP/ADV) 100 mL MBP  3.375 g IntraVENous Q8H Christelle Molina MD 25 mL/hr at 23 0622 3.375 g at 23 0622      No Known Allergies     Review of Systems:  A comprehensive review of systems was negative except for that written in the History of Present Illness. Objective:     Patient Vitals for the past 8 hrs:   BP Temp Pulse Resp SpO2   23 0911 (!) 166/100 97.8 °F (36.6 °C) 88 16 99 %     Temp (24hrs), Av.6 °F (37 °C), Min:97.8 °F (36.6 °C), Max:99.8 °F (37.7 °C)      Physical Exam:    Normal DP and PT pulses bilateral. Decreased protective sensation. Ulcer to the lateral aspect of the left 4th toe PIPJ that probes to bone with serous drainage.  Significant swelling and erythema to digit along with tenderness upon palpation. There is no edema or erythema extending past the 4th MPJ. No other ulcers noted to either foot. Lab Review:   Recent Results (from the past 24 hour(s))   CBC WITH AUTOMATED DIFF    Collection Time: 01/15/23  3:31 PM   Result Value Ref Range    WBC 10.2 4.1 - 11.1 K/uL    RBC 4.72 4.10 - 5.70 M/uL    HGB 13.6 12.1 - 17.0 g/dL    HCT 41.8 36.6 - 50.3 %    MCV 88.6 80.0 - 99.0 FL    MCH 28.8 26.0 - 34.0 PG    MCHC 32.5 30.0 - 36.5 g/dL    RDW 12.8 11.5 - 14.5 %    PLATELET 206 139 - 268 K/uL    MPV 10.6 8.9 - 12.9 FL    NRBC 0.0 0  WBC    ABSOLUTE NRBC 0.00 0.00 - 0.01 K/uL    NEUTROPHILS 66 32 - 75 %    LYMPHOCYTES 22 12 - 49 %    MONOCYTES 8 5 - 13 %    EOSINOPHILS 3 0 - 7 %    BASOPHILS 1 0 - 1 %    IMMATURE GRANULOCYTES 0 0.0 - 0.5 %    ABS. NEUTROPHILS 6.7 1.8 - 8.0 K/UL    ABS. LYMPHOCYTES 2.2 0.8 - 3.5 K/UL    ABS. MONOCYTES 0.8 0.0 - 1.0 K/UL    ABS. EOSINOPHILS 0.3 0.0 - 0.4 K/UL    ABS. BASOPHILS 0.1 0.0 - 0.1 K/UL    ABS. IMM. GRANS. 0.0 0.00 - 0.04 K/UL    DF AUTOMATED     METABOLIC PANEL, COMPREHENSIVE    Collection Time: 01/15/23  3:31 PM   Result Value Ref Range    Sodium 141 136 - 145 mmol/L    Potassium 4.1 3.5 - 5.1 mmol/L    Chloride 104 97 - 108 mmol/L    CO2 32 21 - 32 mmol/L    Anion gap 5 5 - 15 mmol/L    Glucose 218 (H) 65 - 100 mg/dL    BUN 13 6 - 20 MG/DL    Creatinine 0.93 0.70 - 1.30 MG/DL    BUN/Creatinine ratio 14 12 - 20      eGFR >60 >60 ml/min/1.73m2    Calcium 9.2 8.5 - 10.1 MG/DL    Bilirubin, total 0.4 0.2 - 1.0 MG/DL    ALT (SGPT) 20 12 - 78 U/L    AST (SGOT) 14 (L) 15 - 37 U/L    Alk.  phosphatase 96 45 - 117 U/L    Protein, total 7.7 6.4 - 8.2 g/dL    Albumin 3.1 (L) 3.5 - 5.0 g/dL    Globulin 4.6 (H) 2.0 - 4.0 g/dL    A-G Ratio 0.7 (L) 1.1 - 2.2     C REACTIVE PROTEIN, QT    Collection Time: 01/15/23  3:31 PM   Result Value Ref Range    C-Reactive protein 2.36 (H) <0.60 mg/dL   SED RATE (ESR)    Collection Time: 01/15/23  3:31 PM   Result Value Ref Range Sed rate, automated 56 (H) 0 - 20 mm/hr   CULTURE, BLOOD, PAIRED    Collection Time: 01/15/23  3:31 PM    Specimen: Blood   Result Value Ref Range    Special Requests: NO SPECIAL REQUESTS      Culture result: NO GROWTH AFTER 11 HOURS     LACTIC ACID    Collection Time: 01/15/23  3:31 PM   Result Value Ref Range    Lactic acid 0.9 0.4 - 2.0 MMOL/L   GLUCOSE, POC    Collection Time: 01/15/23 11:12 PM   Result Value Ref Range    Glucose (POC) 223 (H) 65 - 117 mg/dL    Performed by Jill Holloway, URINE    Collection Time: 01/16/23  1:13 AM   Result Value Ref Range    AMPHETAMINES Negative NEG      BARBITURATES Negative NEG      BENZODIAZEPINES Negative NEG      COCAINE Positive (A) NEG      METHADONE Negative NEG      OPIATES Negative NEG      PCP(PHENCYCLIDINE) Negative NEG      THC (TH-CANNABINOL) Negative NEG      Drug screen comment (NOTE)    URINALYSIS W/MICROSCOPIC    Collection Time: 01/16/23  1:13 AM   Result Value Ref Range    Color YELLOW/STRAW      Appearance CLEAR CLEAR      Specific gravity 1.012 1.003 - 1.030      pH (UA) 6.5 5.0 - 8.0      Protein 30 (A) NEG mg/dL    Glucose Negative NEG mg/dL    Ketone Negative NEG mg/dL    Bilirubin Negative NEG      Blood Negative NEG      Urobilinogen 0.2 0.2 - 1.0 EU/dL    Nitrites Negative NEG      Leukocyte Esterase Negative NEG      WBC 0-4 0 - 4 /hpf    RBC 0-5 0 - 5 /hpf    Epithelial cells FEW FEW /lpf    Bacteria Negative NEG /hpf    Hyaline cast 0-2 0 - 5 /lpf   METABOLIC PANEL, COMPREHENSIVE    Collection Time: 01/16/23  5:13 AM   Result Value Ref Range    Sodium 138 136 - 145 mmol/L    Potassium 4.4 3.5 - 5.1 mmol/L    Chloride 105 97 - 108 mmol/L    CO2 28 21 - 32 mmol/L    Anion gap 5 5 - 15 mmol/L    Glucose 210 (H) 65 - 100 mg/dL    BUN 15 6 - 20 MG/DL    Creatinine 0.84 0.70 - 1.30 MG/DL    BUN/Creatinine ratio 18 12 - 20      eGFR >60 >60 ml/min/1.73m2    Calcium 8.7 8.5 - 10.1 MG/DL    Bilirubin, total 0.4 0.2 - 1.0 MG/DL    ALT (SGPT) 18 12 - 78 U/L    AST (SGOT) 11 (L) 15 - 37 U/L    Alk. phosphatase 86 45 - 117 U/L    Protein, total 6.0 (L) 6.4 - 8.2 g/dL    Albumin 2.6 (L) 3.5 - 5.0 g/dL    Globulin 3.4 2.0 - 4.0 g/dL    A-G Ratio 0.8 (L) 1.1 - 2.2     CBC WITH AUTOMATED DIFF    Collection Time: 01/16/23  5:13 AM   Result Value Ref Range    WBC 8.4 4.1 - 11.1 K/uL    RBC 4.32 4.10 - 5.70 M/uL    HGB 12.5 12.1 - 17.0 g/dL    HCT 38.6 36.6 - 50.3 %    MCV 89.4 80.0 - 99.0 FL    MCH 28.9 26.0 - 34.0 PG    MCHC 32.4 30.0 - 36.5 g/dL    RDW 12.6 11.5 - 14.5 %    PLATELET 356 746 - 270 K/uL    MPV 10.6 8.9 - 12.9 FL    NRBC 0.0 0  WBC    ABSOLUTE NRBC 0.00 0.00 - 0.01 K/uL    NEUTROPHILS 63 32 - 75 %    LYMPHOCYTES 21 12 - 49 %    MONOCYTES 10 5 - 13 %    EOSINOPHILS 5 0 - 7 %    BASOPHILS 1 0 - 1 %    IMMATURE GRANULOCYTES 0 0.0 - 0.5 %    ABS. NEUTROPHILS 5.3 1.8 - 8.0 K/UL    ABS. LYMPHOCYTES 1.8 0.8 - 3.5 K/UL    ABS. MONOCYTES 0.9 0.0 - 1.0 K/UL    ABS. EOSINOPHILS 0.4 0.0 - 0.4 K/UL    ABS. BASOPHILS 0.0 0.0 - 0.1 K/UL    ABS. IMM.  GRANS. 0.0 0.00 - 0.04 K/UL    DF AUTOMATED     TSH 3RD GENERATION    Collection Time: 01/16/23  5:13 AM   Result Value Ref Range    TSH 1.19 0.36 - 3.74 uIU/mL   MAGNESIUM    Collection Time: 01/16/23  5:13 AM   Result Value Ref Range    Magnesium 1.6 1.6 - 2.4 mg/dL   PHOSPHORUS    Collection Time: 01/16/23  5:13 AM   Result Value Ref Range    Phosphorus 3.7 2.6 - 4.7 MG/DL   HEMOGLOBIN A1C WITH EAG    Collection Time: 01/16/23  5:13 AM   Result Value Ref Range    Hemoglobin A1c 8.5 (H) 4.0 - 5.6 %    Est. average glucose 197 mg/dL   GLUCOSE, POC    Collection Time: 01/16/23  7:41 AM   Result Value Ref Range    Glucose (POC) 169 (H) 65 - 117 mg/dL    Performed by Alcira Rubi    EKG, 12 LEAD, INITIAL    Collection Time: 01/16/23  9:01 AM   Result Value Ref Range    Ventricular Rate 85 BPM    Atrial Rate 85 BPM    P-R Interval 164 ms    QRS Duration 68 ms    Q-T Interval 380 ms    QTC Calculation (Bezet) 452 ms    Calculated P Axis 66 degrees    Calculated R Axis 66 degrees    Calculated T Axis 72 degrees    Diagnosis       Normal sinus rhythm  Normal ECG  When compared with ECG of 07-JUN-2019 04:04,  No significant change was found  Confirmed by Kane Grullon ([id number]) on 1/16/2023 9:28:32 AM     GLUCOSE, POC    Collection Time: 01/16/23 11:16 AM   Result Value Ref Range    Glucose (POC) 131 (H) 65 - 117 mg/dL    Performed by Heather Head   PCT        Impression:     Osteomyelitis of left 4th toe  Diabetes mellitus with neuropathy    Recommendation:   -X-ray findings reviewed with patient at bedside  -Discussed with patient that based on his results and the clinical probe to bone of the ulcer that he would be best served with an amputation of the left 4th toe for a surgical cure of the osteomyelitis. I related to him that in this situation that long-term IV abx have poor results especially with the amount of destruction noted of middle phalanx. Patient agreeable to plan. -Patient to OR today for left 4th toe amputation  -NPO  -Verify consent.

## 2023-01-16 NOTE — ANESTHESIA PREPROCEDURE EVALUATION
Relevant Problems   No relevant active problems       Anesthetic History   No history of anesthetic complications            Review of Systems / Medical History  Patient summary reviewed, nursing notes reviewed and pertinent labs reviewed    Pulmonary  Within defined limits        Smoker         Neuro/Psych   Within defined limits           Cardiovascular  Within defined limits                     GI/Hepatic/Renal  Within defined limits              Endo/Other  Within defined limits  Diabetes         Other Findings              Physical Exam    Airway  Mallampati: II  TM Distance: > 6 cm  Neck ROM: normal range of motion   Mouth opening: Normal     Cardiovascular  Regular rate and rhythm,  S1 and S2 normal,  no murmur, click, rub, or gallop             Dental  No notable dental hx       Pulmonary  Breath sounds clear to auscultation               Abdominal  GI exam deferred       Other Findings            Anesthetic Plan    ASA: 2  Anesthesia type: MAC            Anesthetic plan and risks discussed with: Patient

## 2023-01-16 NOTE — ED NOTES
Verbal report and admission paperwork given to [facility] providers; time allotted for questions but denied having any at this time. Pt alert and oriented x4. VSS. Pt transported out of ED via [facility] stretcher from [facility] 4 to [facility] 541.

## 2023-01-17 LAB
ANION GAP SERPL CALC-SCNC: 5 MMOL/L (ref 5–15)
BASOPHILS # BLD: 0.1 K/UL (ref 0–0.1)
BASOPHILS NFR BLD: 1 % (ref 0–1)
BUN SERPL-MCNC: 14 MG/DL (ref 6–20)
BUN/CREAT SERPL: 15 (ref 12–20)
CALCIUM SERPL-MCNC: 8.8 MG/DL (ref 8.5–10.1)
CHLORIDE SERPL-SCNC: 102 MMOL/L (ref 97–108)
CO2 SERPL-SCNC: 28 MMOL/L (ref 21–32)
CREAT SERPL-MCNC: 0.91 MG/DL (ref 0.7–1.3)
DIFFERENTIAL METHOD BLD: ABNORMAL
EOSINOPHIL # BLD: 0.4 K/UL (ref 0–0.4)
EOSINOPHIL NFR BLD: 4 % (ref 0–7)
ERYTHROCYTE [DISTWIDTH] IN BLOOD BY AUTOMATED COUNT: 12.7 % (ref 11.5–14.5)
GLUCOSE BLD STRIP.AUTO-MCNC: 160 MG/DL (ref 65–117)
GLUCOSE BLD STRIP.AUTO-MCNC: 164 MG/DL (ref 65–117)
GLUCOSE BLD STRIP.AUTO-MCNC: 191 MG/DL (ref 65–117)
GLUCOSE BLD STRIP.AUTO-MCNC: 233 MG/DL (ref 65–117)
GLUCOSE SERPL-MCNC: 203 MG/DL (ref 65–100)
HCT VFR BLD AUTO: 38.5 % (ref 36.6–50.3)
HGB BLD-MCNC: 12.2 G/DL (ref 12.1–17)
IMM GRANULOCYTES # BLD AUTO: 0 K/UL (ref 0–0.04)
IMM GRANULOCYTES NFR BLD AUTO: 0 % (ref 0–0.5)
LYMPHOCYTES # BLD: 1.8 K/UL (ref 0.8–3.5)
LYMPHOCYTES NFR BLD: 18 % (ref 12–49)
MAGNESIUM SERPL-MCNC: 1.5 MG/DL (ref 1.6–2.4)
MCH RBC QN AUTO: 28.8 PG (ref 26–34)
MCHC RBC AUTO-ENTMCNC: 31.7 G/DL (ref 30–36.5)
MCV RBC AUTO: 90.8 FL (ref 80–99)
MONOCYTES # BLD: 1.1 K/UL (ref 0–1)
MONOCYTES NFR BLD: 11 % (ref 5–13)
NEUTS SEG # BLD: 6.4 K/UL (ref 1.8–8)
NEUTS SEG NFR BLD: 66 % (ref 32–75)
NRBC # BLD: 0 K/UL (ref 0–0.01)
NRBC BLD-RTO: 0 PER 100 WBC
PHOSPHATE SERPL-MCNC: 3.8 MG/DL (ref 2.6–4.7)
PLATELET # BLD AUTO: 273 K/UL (ref 150–400)
PMV BLD AUTO: 10.8 FL (ref 8.9–12.9)
POTASSIUM SERPL-SCNC: 4.2 MMOL/L (ref 3.5–5.1)
RBC # BLD AUTO: 4.24 M/UL (ref 4.1–5.7)
SERVICE CMNT-IMP: ABNORMAL
SODIUM SERPL-SCNC: 135 MMOL/L (ref 136–145)
VANCOMYCIN SERPL-MCNC: 9.9 UG/ML
WBC # BLD AUTO: 9.8 K/UL (ref 4.1–11.1)

## 2023-01-17 PROCEDURE — 65270000032 HC RM SEMIPRIVATE

## 2023-01-17 PROCEDURE — 74011250637 HC RX REV CODE- 250/637: Performed by: NURSE PRACTITIONER

## 2023-01-17 PROCEDURE — 85025 COMPLETE CBC W/AUTO DIFF WBC: CPT

## 2023-01-17 PROCEDURE — 74011636637 HC RX REV CODE- 636/637: Performed by: INTERNAL MEDICINE

## 2023-01-17 PROCEDURE — 74011636637 HC RX REV CODE- 636/637: Performed by: NURSE PRACTITIONER

## 2023-01-17 PROCEDURE — 74011250636 HC RX REV CODE- 250/636: Performed by: INTERNAL MEDICINE

## 2023-01-17 PROCEDURE — 36415 COLL VENOUS BLD VENIPUNCTURE: CPT

## 2023-01-17 PROCEDURE — 80202 ASSAY OF VANCOMYCIN: CPT

## 2023-01-17 PROCEDURE — 74011000250 HC RX REV CODE- 250: Performed by: INTERNAL MEDICINE

## 2023-01-17 PROCEDURE — 82962 GLUCOSE BLOOD TEST: CPT

## 2023-01-17 PROCEDURE — 74011000258 HC RX REV CODE- 258: Performed by: INTERNAL MEDICINE

## 2023-01-17 PROCEDURE — 80048 BASIC METABOLIC PNL TOTAL CA: CPT

## 2023-01-17 PROCEDURE — 74011250637 HC RX REV CODE- 250/637: Performed by: PODIATRIST

## 2023-01-17 PROCEDURE — 83735 ASSAY OF MAGNESIUM: CPT

## 2023-01-17 PROCEDURE — 84100 ASSAY OF PHOSPHORUS: CPT

## 2023-01-17 RX ORDER — VANCOMYCIN/0.9 % SOD CHLORIDE 1.5G/250ML
1500 PLASTIC BAG, INJECTION (ML) INTRAVENOUS EVERY 12 HOURS
Status: DISCONTINUED | OUTPATIENT
Start: 2023-01-17 | End: 2023-01-19 | Stop reason: HOSPADM

## 2023-01-17 RX ORDER — AMLODIPINE BESYLATE 5 MG/1
5 TABLET ORAL DAILY
Status: DISCONTINUED | OUTPATIENT
Start: 2023-01-17 | End: 2023-01-18

## 2023-01-17 RX ORDER — INSULIN GLARGINE 100 [IU]/ML
15 INJECTION, SOLUTION SUBCUTANEOUS DAILY
Status: DISCONTINUED | OUTPATIENT
Start: 2023-01-17 | End: 2023-01-19 | Stop reason: HOSPADM

## 2023-01-17 RX ORDER — METFORMIN HYDROCHLORIDE 500 MG/1
1000 TABLET ORAL 2 TIMES DAILY WITH MEALS
Status: DISCONTINUED | OUTPATIENT
Start: 2023-01-17 | End: 2023-01-19 | Stop reason: HOSPADM

## 2023-01-17 RX ORDER — LANOLIN ALCOHOL/MO/W.PET/CERES
400 CREAM (GRAM) TOPICAL ONCE
Status: COMPLETED | OUTPATIENT
Start: 2023-01-17 | End: 2023-01-17

## 2023-01-17 RX ADMIN — Medication 3 UNITS: at 12:18

## 2023-01-17 RX ADMIN — HYDROCODONE BITARTRATE AND ACETAMINOPHEN 1 TABLET: 5; 325 TABLET ORAL at 01:45

## 2023-01-17 RX ADMIN — ENOXAPARIN SODIUM 30 MG: 100 INJECTION SUBCUTANEOUS at 22:39

## 2023-01-17 RX ADMIN — SODIUM CHLORIDE, POTASSIUM CHLORIDE, SODIUM LACTATE AND CALCIUM CHLORIDE 100 ML/HR: 600; 310; 30; 20 INJECTION, SOLUTION INTRAVENOUS at 17:53

## 2023-01-17 RX ADMIN — PIPERACILLIN AND TAZOBACTAM 3.38 G: 3; .375 INJECTION, POWDER, FOR SOLUTION INTRAVENOUS at 12:18

## 2023-01-17 RX ADMIN — METFORMIN HYDROCHLORIDE 1000 MG: 500 TABLET ORAL at 17:53

## 2023-01-17 RX ADMIN — PIPERACILLIN AND TAZOBACTAM 3.38 G: 3; .375 INJECTION, POWDER, FOR SOLUTION INTRAVENOUS at 22:34

## 2023-01-17 RX ADMIN — AMLODIPINE BESYLATE 5 MG: 5 TABLET ORAL at 09:28

## 2023-01-17 RX ADMIN — MAGNESIUM OXIDE 400 MG (241.3 MG MAGNESIUM) TABLET 400 MG: TABLET at 09:28

## 2023-01-17 RX ADMIN — SODIUM CHLORIDE, PRESERVATIVE FREE 10 ML: 5 INJECTION INTRAVENOUS at 22:39

## 2023-01-17 RX ADMIN — ENOXAPARIN SODIUM 30 MG: 100 INJECTION SUBCUTANEOUS at 09:27

## 2023-01-17 RX ADMIN — INSULIN GLARGINE 15 UNITS: 100 INJECTION, SOLUTION SUBCUTANEOUS at 09:27

## 2023-01-17 RX ADMIN — Medication 2 UNITS: at 06:54

## 2023-01-17 RX ADMIN — Medication 2 UNITS: at 17:53

## 2023-01-17 RX ADMIN — METFORMIN HYDROCHLORIDE 1000 MG: 500 TABLET ORAL at 09:28

## 2023-01-17 RX ADMIN — HYDROCODONE BITARTRATE AND ACETAMINOPHEN 1 TABLET: 5; 325 TABLET ORAL at 05:28

## 2023-01-17 RX ADMIN — PIPERACILLIN AND TAZOBACTAM 3.38 G: 3; .375 INJECTION, POWDER, FOR SOLUTION INTRAVENOUS at 05:17

## 2023-01-17 RX ADMIN — VANCOMYCIN HYDROCHLORIDE 1500 MG: 10 INJECTION, POWDER, LYOPHILIZED, FOR SOLUTION INTRAVENOUS at 17:52

## 2023-01-17 RX ADMIN — VANCOMYCIN HYDROCHLORIDE 1250 MG: 1.25 INJECTION, POWDER, LYOPHILIZED, FOR SOLUTION INTRAVENOUS at 05:11

## 2023-01-17 RX ADMIN — HYDROCODONE BITARTRATE AND ACETAMINOPHEN 1 TABLET: 5; 325 TABLET ORAL at 14:50

## 2023-01-17 NOTE — PROGRESS NOTES
[address] [facility] Adult  Hospitalist Group                                                                                          Hospitalist Progress Note  Liliya Barreto NP  Answering service: [phone] from in house phone        Date of Service:  2023  NAME:  Lari Simmonds  :  1970  MRN:  [id number]      Admission Summary:     Mr. Debbi Rodriguez is a 70-year-old male with a past medical history significant for type 2 diabetes, tobacco abuse, marijuana use who presented to [facility] emergency room with pain and swelling involving the left fourth toe which started approximately 3 days ago and was getting progressively worse. X-ray in the emergency room shows findings concerning for osteomyelitis of the fourth toe. He was seen by Dr. Joesph Dutta in the ER who recommended a surgical cure of the osteomyelitis with an amputation of the left fourth toe. He underwent an amputation of his left fourth toe on 2023. The patient reports that he \"weaned himself off of diabetes medications\" because he felt like he was fine and blood sugars were stable. We discussed that likely his blood sugars were stable as a result of the medications that he was taking. Hemoglobin A1c is found to be 8.5. He reports a greater than 100 pound intentional weight loss over the past 1.5 years. Patient denies any pain at this time. He is doing well post operatively. Interval history / Subjective:     No acute complaints at this time. Discussed that we will be working on blood sugar and blood pressure control over the next couple of days. Awaiting pathology from cultures.      Assessment & Plan:     Left fourth toe osteomyelitis:  - Patient is status post left fourth toe amputation with expected surgical cure of osteo-  -Weightbearing as tolerated to left foot with surgical shoe  - Culture growing gram-negative rods, continue IV antibiotics for now and can likely transition to p.o. for discharge pending final culture results  -Zosyn plus Vanco    Hypomagnesemia:   - mild , one dose of magnesium 400 mg PO given today      Elevated blood pressure:  - Patient has no documented diagnosis of hypertension however systolic blood pressure has ranged between 145 and 167 throughout admission  - We will start him on Norvasc 5 mg daily and titrate up  -Not having any pain so I do not think it is pain related    Type 2 diabetes, uncontrolled:   -Patient restarted on metformin 1000 mg 2 times daily  -Patient started on Lantus 15 units nightly  -Diabetes educator consulted  -A1C8.5  -Accu-Cheks and SSI       Code status: Full   Prophylaxis: Lovenox  Care Plan discussed with: Patient and RN   Anticipated Disposition: 24-48 hours      Hospital Problems  Date Reviewed: 1/15/2023            Codes Class Noted POA    * (Principal) Osteomyelitis ([facility] [address] ICD-10-CM: M86.9  ICD-9-CM: 730.20  1/15/2023 Yes             Review of Systems:   A comprehensive review of systems was negative except for that written in the HPI. Vital Signs:    Last 24hrs VS reviewed since prior progress note. Most recent are:  Visit Vitals  BP (!) 150/83 (BP 1 Location: Right upper arm, BP Patient Position: Sitting)   Pulse 88   Temp 99 °F (37.2 °C)   Resp 18   Ht 6' 2\" (1.88 m)   Wt 111.1 kg (245 lb)   SpO2 98%   BMI 31.46 kg/m²         Intake/Output Summary (Last 24 hours) at 1/17/2023 1500  Last data filed at 1/17/2023 0160  Gross per 24 hour   Intake 1350 ml   Output 1205 ml   Net 145 ml        Physical Examination:     I had a face to face encounter with this patient and independently examined them on 1/17/2023 as outlined below:          Constitutional:  No acute distress, cooperative, pleasant    ENT:  Oral mucosa moist, oropharynx benign. Resp:  Mild expiratory wheezing noted. + Productive cough. No rhonchi/rales. No accessory muscle use. CV:  Regular rhythm, normal rate, no murmurs, gallops, rubs    GI:  Soft, non distended, non tender. normoactive bowel sounds, no hepatosplenomegaly     Musculoskeletal:  No edema, warm, 2+ pulses throughout    Neurologic:  Moves all extremities. AAOx3, CN II-XII reviewed  Skin: Left foot with post op surgical dressing in place, C/D/I            Data Review:    Review and/or order of clinical lab test  Review and/or order of tests in the radiology section of [facility]      Labs:     Recent Labs     01/17/23 0332 01/16/23 0513   WBC 9.8 8.4   HGB 12.2 12.5   HCT 38.5 38.6    278     Recent Labs     01/17/23  0332 01/16/23  0513 01/15/23  1531   * 138 141   K 4.2 4.4 4.1    105 104   CO2 28 28 32   BUN 14 15 13   CREA 0.91 0.84 0.93   * 210* 218*   CA 8.8 8.7 9.2   MG 1.5* 1.6  --    PHOS 3.8 3.7  --      Recent Labs     01/16/23  0513 01/15/23  1531   ALT 18 20   AP 86 96   TBILI 0.4 0.4   TP 6.0* 7.7   ALB 2.6* 3.1*   GLOB 3.4 4.6*     No results for input(s): INR, PTP, APTT, INREXT in the last 72 hours. No results for input(s): FE, TIBC, PSAT, FERR in the last 72 hours. No results found for: FOL, RBCF   No results for input(s): PH, PCO2, PO2 in the last 72 hours. No results for input(s): CPK, CKNDX, TROIQ in the last 72 hours.     No lab exists for component: CPKMB  Lab Results   Component Value Date/Time    Cholesterol, total 200 (H) 07/31/2018 10:05 AM    HDL Cholesterol 50 07/31/2018 10:05 AM    LDL, calculated 96.6 07/31/2018 10:05 AM    Triglyceride 267 (H) 07/31/2018 10:05 AM    CHOL/HDL Ratio 4.0 07/31/2018 10:05 AM     Lab Results   Component Value Date/Time    Glucose (POC) 233 (H) 01/17/2023 11:25 AM    Glucose (POC) 191 (H) 01/17/2023 06:20 AM    Glucose (POC) 264 (H) 01/16/2023 09:25 PM    Glucose (POC) 98 01/16/2023 05:42 PM    Glucose (POC) 131 (H) 01/16/2023 11:16 AM     Lab Results   Component Value Date/Time    Color YELLOW/STRAW 01/16/2023 01:13 AM    Appearance CLEAR 01/16/2023 01:13 AM    Specific gravity 1.012 01/16/2023 01:13 AM    pH (UA) 6.5 01/16/2023 01:13 AM Protein 30 (A) 01/16/2023 01:13 AM    Glucose Negative 01/16/2023 01:13 AM    Ketone Negative 01/16/2023 01:13 AM    Bilirubin Negative 01/16/2023 01:13 AM    Urobilinogen 0.2 01/16/2023 01:13 AM    Nitrites Negative 01/16/2023 01:13 AM    Leukocyte Esterase Negative 01/16/2023 01:13 AM    Epithelial cells FEW 01/16/2023 01:13 AM    Bacteria Negative 01/16/2023 01:13 AM    WBC 0-4 01/16/2023 01:13 AM    RBC 0-5 01/16/2023 01:13 AM         Medications Reviewed:     Current Facility-Administered Medications   Medication Dose Route Frequency    insulin glargine (LANTUS) injection 15 Units  15 Units SubCUTAneous DAILY    metFORMIN (GLUCOPHAGE) tablet 1,000 mg  1,000 mg Oral BID WITH MEALS    amLODIPine (NORVASC) tablet 5 mg  5 mg Oral DAILY    vancomycin (VANCOCIN) 1500 mg in  ml infusion  1,500 mg IntraVENous Q12H    hydrALAZINE (APRESOLINE) 20 mg/mL injection 10 mg  10 mg IntraVENous Q6H PRN    enoxaparin (LOVENOX) injection 30 mg  30 mg SubCUTAneous Q12H    HYDROcodone-acetaminophen (NORCO) 5-325 mg per tablet 1 Tablet  1 Tablet Oral Q4H PRN    sodium chloride (NS) flush 5-40 mL  5-40 mL IntraVENous Q8H    sodium chloride (NS) flush 5-40 mL  5-40 mL IntraVENous PRN    acetaminophen (TYLENOL) tablet 650 mg  650 mg Oral Q6H PRN    Or    acetaminophen (TYLENOL) suppository 650 mg  650 mg Rectal Q6H PRN    polyethylene glycol (MIRALAX) packet 17 g  17 g Oral DAILY PRN    ondansetron (ZOFRAN ODT) tablet 4 mg  4 mg Oral Q8H PRN    Or    ondansetron (ZOFRAN) injection 4 mg  4 mg IntraVENous Q6H PRN    insulin lispro (HUMALOG) injection   SubCUTAneous AC&HS    glucose chewable tablet 16 g  4 Tablet Oral PRN    glucagon (GLUCAGEN) injection 1 mg  1 mg IntraMUSCular PRN    dextrose 10 % infusion 0-250 mL  0-250 mL IntraVENous PRN    morphine injection 2 mg  2 mg IntraVENous Q4H PRN    lactated Ringers infusion  100 mL/hr IntraVENous CONTINUOUS    Vancomycin- pharmacy to dose   Other Rx Dosing/Monitoring piperacillin-tazobactam (ZOSYN) 3.375 g in 0.9% sodium chloride (MBP/ADV) 100 mL MBP  3.375 g IntraVENous Q8H     ______________________________________________________________________  EXPECTED LENGTH OF STAY: 2d 21h  ACTUAL LENGTH OF STAY:          2                 Lauren Fernandez NP

## 2023-01-17 NOTE — PROGRESS NOTES
Pharmacist Note - Vancomycin Dosing  Therapy day 3  Indication: Osteomyelitis (L 4th toe)  Current regimen: 1250 mg IV every 12 hours    Recent Labs     01/17/23  0332 01/16/23  0513 01/15/23  1531   WBC 9.8 8.4 10.2   CREA 0.91 0.84 0.93   BUN 14 15 13       A random vancomycin level of 9.9 mcg/mL was obtained and from this level, the patient's AUC24 is calculated to be 393 with the current regimen. Goal target range AUC/MARY 400-600      Plan: Change to vancomycin 1500 mg IV every 12 hours  for predicted AUC of 470 mg/L*hr. Pharmacy will continue to monitor this patient daily for changes in clinical status and renal function. *Random vancomycin levels are used to calculate AUC/MARY, this level should not be interpreted as a trough. Vancomycin has been dosed using Bayesian kinetics software to target an AUC24:MARY of 400-600, which provides adequate exposure for as assumed infection due to MRSA with an MARY of 1 or less while reducing the risk of nephrotoxicity as seen with traditional trough based dosing goals.

## 2023-01-17 NOTE — PROGRESS NOTES
Progress Note    Patient: Fabrizio Brown MRN: [id number]  SSN: [id number]    YOB: 1970  Age: 46 y.o. Sex: male      Admit Date: 1/15/2023  1 Day Post-Op     Procedure:   Procedure(s):  LEFT 4TH TOE AMPUTATION    Subjective:     Patient seen resting quiet and comfortably and no apparent distress. Denies any pain. Doing well after surgery. Objective:     Visit Vitals  BP (!) 150/83 (BP 1 Location: Right upper arm, BP Patient Position: Sitting)   Pulse 88   Temp 99 °F (37.2 °C)   Resp 18   Ht 6' 2\" (1.88 m)   Wt 111.1 kg (245 lb)   SpO2 98%   BMI 31.46 kg/m²        Physical Exam:  Left foot: Surgical dressing clean/dry and intact. Minimal strike-through to inner dressings. Amputation site well-approximated with all sutures intact and no signs of necrosis. No cellulitis or erythema noted. Labs/Radiology Review: images and reports reviewed    Assessment:     Hospital Problems  Date Reviewed: 1/15/2023            Codes Class Noted POA    * (Principal[facility] ICD-10-CM: M86.9  ICD-9-CM: 730.20  1/15/2023 Yes           Plan/Recommendations/Medical Decision Making:   -Weightbearing as tolerated to left foot with surgical shoe. -Expected surgical cure. Infection at level of middle phalanx/PIPJ and level of resection was proximally at the MPJ.  -Toe sent for pathology and culture. -Culture growing gram negative rods. Patient will likely be stable for d/c on po abx once cultures final as cellulitis is already resolved following amputation.  -Continue to follow.  -Continue IV abx.

## 2023-01-17 NOTE — OP NOTES
[address]   OPERATIVE REPORT    Name:  Robert Dean  MR#:  [id number]  :  1970  ACCOUNT #:  [de-identified]  DATE OF SERVICE:  2023    PREOPERATIVE DIAGNOSIS:  Left fourth toe osteomyelitis. POSTOPERATIVE DIAGNOSIS:  Left fourth toe osteomyelitis. PROCEDURE PERFORMED:  Left fourth toe amputation. SURGEON:  Aracelis Paulino DPM    SURGICAL ASSISTANT:  None. ANESTHESIA:  Monitored anesthesia care with total of 18 mL of 1:1 mix of 1% lidocaine plain, 0.25% bupivacaine plain. COMPLICATIONS:  None. SPECIMENS REMOVED:  Left fourth toe sent for routine pathology and left fourth toe deep culture. IMPLANTS:  None. ESTIMATED BLOOD LOSS:  Minimal    HEMOSTASIS:  None. No use of tourniquet. PROCEDURE:  Under mild sedation, the patient was brought into the operating room and remained on his hospital bed for the entirety of the procedure. The patient was adequately sedated and IV sedation was used for the entirety of the procedure for monitored anesthesia care. After the patient was adequately sedated preop, an injection time-out was performed and local anesthesia was administered to the patient's left fourth ray in a ray block, utilizing a total of 18 mL of 1:1 mix of 1% lidocaine plain, 0.25% bupivacaine plain. The foot was then scrubbed, prepped and draped in the usual aseptic type manner. A full and complete injection preoperative time-out was performed. Attention was then directed to the patient's right fourth ray, where preoperative x-rays had shown degenerative changes consistent with osteomyelitis and preoperative clinical exam was consistent with a probe to bone ulcer. A racket-type incision was made just distal to the patient's fourth metatarsophalangeal joint. The incision was made down to bone utilizing a #15 blade. With sharp dissection, the digit was then disarticulated at the level of the metatarsophalangeal joint.   The digit was then passed off the table, and the digit was sent for routine pathology. A deep culture was then also taken at the level of the wound. There were  significant degenerative changes noted at the level of the middle phalanx as well as the head of the proximal phalanx, which was noted to be nearly eroded due to the infection. The residual wound was noted to be clean with minimal changes. The head of the patient's metatarsal did not have any degenerative changes at all. The head of the metatarsal was white and clean, with all articular cartilage and shaft intact. The area was cleared, was flushed with copious amounts of sterile saline. The incision site was then reapproximated utilizing 3-0 nylon. No tourniquet was used for this procedure. Hemostasis was utilized, utilizing pressure for any small noted bleeding vessels throughout this procedure. The surgical site was dressed with xeroform, 4x4s, webril, and an ACE wrap. The patient was then awoken from surgery and transferred to the postanesthesia care unit.       NARESH Velasquez DPM      [id number]  D:  01/16/2023 17:43  T:  01/17/2023 2:47  JOB #:  [id number]

## 2023-01-17 NOTE — PROGRESS NOTES
Transition of Care: TBD; hopefully home with or without HH; no final orders yet    Transport Plan: likely in a car    RUR: 5%    Main contact is friend- Abiel Almaguer- [phone]    Discharge pending:  -patient is POD#1 left toe amputation  -pending final podiatry recommendations  -pending medical progress and care recommendations    CM noted from chart and in IDR rounds    CM following  Sumaya George RN    NOTE: patient is normally independent in ADLs; preferred pharmacy is [facility]

## 2023-01-17 NOTE — WOUND CARE
Wound care consult by physician request for left foot wound. Left foot is being managed by Dr. Riki Kidd; s/p left 4th toe amputation on 1/16/23, dressing is in place and was not removed. Educated the patient of the importance of offloading heels. Right heel skin intact without erythema; offloaded on blanket. Sacral skin intact with hyperpigmentation that appears to be patient's natural pigmentation.     Wound care will sign off but is available at Dr. Marta Castellanos request.    ERIS Zepeda, RN, [address] [place] [address] Dr  Office x [phone]  Fasted way to contact me is Perfect Serve

## 2023-01-18 LAB
ANION GAP SERPL CALC-SCNC: 4 MMOL/L (ref 5–15)
BACTERIA SPEC CULT: NORMAL
BASOPHILS # BLD: 0.1 K/UL (ref 0–0.1)
BASOPHILS NFR BLD: 1 % (ref 0–1)
BUN SERPL-MCNC: 17 MG/DL (ref 6–20)
BUN/CREAT SERPL: 17 (ref 12–20)
CALCIUM SERPL-MCNC: 9 MG/DL (ref 8.5–10.1)
CHLORIDE SERPL-SCNC: 102 MMOL/L (ref 97–108)
CO2 SERPL-SCNC: 30 MMOL/L (ref 21–32)
CREAT SERPL-MCNC: 0.99 MG/DL (ref 0.7–1.3)
DIFFERENTIAL METHOD BLD: NORMAL
EOSINOPHIL # BLD: 0.4 K/UL (ref 0–0.4)
EOSINOPHIL NFR BLD: 5 % (ref 0–7)
ERYTHROCYTE [DISTWIDTH] IN BLOOD BY AUTOMATED COUNT: 12.8 % (ref 11.5–14.5)
GLUCOSE BLD STRIP.AUTO-MCNC: 137 MG/DL (ref 65–117)
GLUCOSE BLD STRIP.AUTO-MCNC: 149 MG/DL (ref 65–117)
GLUCOSE BLD STRIP.AUTO-MCNC: 159 MG/DL (ref 65–117)
GLUCOSE BLD STRIP.AUTO-MCNC: 170 MG/DL (ref 65–117)
GLUCOSE SERPL-MCNC: 185 MG/DL (ref 65–100)
HCT VFR BLD AUTO: 43.1 % (ref 36.6–50.3)
HGB BLD-MCNC: 13.3 G/DL (ref 12.1–17)
IMM GRANULOCYTES # BLD AUTO: 0 K/UL (ref 0–0.04)
IMM GRANULOCYTES NFR BLD AUTO: 0 % (ref 0–0.5)
LYMPHOCYTES # BLD: 2.3 K/UL (ref 0.8–3.5)
LYMPHOCYTES NFR BLD: 25 % (ref 12–49)
MAGNESIUM SERPL-MCNC: 1.7 MG/DL (ref 1.6–2.4)
MCH RBC QN AUTO: 28.5 PG (ref 26–34)
MCHC RBC AUTO-ENTMCNC: 30.9 G/DL (ref 30–36.5)
MCV RBC AUTO: 92.3 FL (ref 80–99)
MONOCYTES # BLD: 0.9 K/UL (ref 0–1)
MONOCYTES NFR BLD: 10 % (ref 5–13)
NEUTS SEG # BLD: 5.5 K/UL (ref 1.8–8)
NEUTS SEG NFR BLD: 59 % (ref 32–75)
NRBC # BLD: 0 K/UL (ref 0–0.01)
NRBC BLD-RTO: 0 PER 100 WBC
PHOSPHATE SERPL-MCNC: 3.3 MG/DL (ref 2.6–4.7)
PLATELET # BLD AUTO: 325 K/UL (ref 150–400)
PMV BLD AUTO: 10.8 FL (ref 8.9–12.9)
POTASSIUM SERPL-SCNC: 4.6 MMOL/L (ref 3.5–5.1)
RBC # BLD AUTO: 4.67 M/UL (ref 4.1–5.7)
SERVICE CMNT-IMP: ABNORMAL
SERVICE CMNT-IMP: NORMAL
SODIUM SERPL-SCNC: 136 MMOL/L (ref 136–145)
WBC # BLD AUTO: 9.2 K/UL (ref 4.1–11.1)

## 2023-01-18 PROCEDURE — 74011636637 HC RX REV CODE- 636/637: Performed by: NURSE PRACTITIONER

## 2023-01-18 PROCEDURE — 74011250637 HC RX REV CODE- 250/637: Performed by: NURSE PRACTITIONER

## 2023-01-18 PROCEDURE — 74011636637 HC RX REV CODE- 636/637: Performed by: INTERNAL MEDICINE

## 2023-01-18 PROCEDURE — 74011250636 HC RX REV CODE- 250/636: Performed by: INTERNAL MEDICINE

## 2023-01-18 PROCEDURE — 99223 1ST HOSP IP/OBS HIGH 75: CPT | Performed by: CLINICAL NURSE SPECIALIST

## 2023-01-18 PROCEDURE — 83735 ASSAY OF MAGNESIUM: CPT

## 2023-01-18 PROCEDURE — 82962 GLUCOSE BLOOD TEST: CPT

## 2023-01-18 PROCEDURE — 80048 BASIC METABOLIC PNL TOTAL CA: CPT

## 2023-01-18 PROCEDURE — 65270000032 HC RM SEMIPRIVATE

## 2023-01-18 PROCEDURE — 74011000258 HC RX REV CODE- 258: Performed by: INTERNAL MEDICINE

## 2023-01-18 PROCEDURE — 85025 COMPLETE CBC W/AUTO DIFF WBC: CPT

## 2023-01-18 PROCEDURE — 74011000250 HC RX REV CODE- 250: Performed by: INTERNAL MEDICINE

## 2023-01-18 PROCEDURE — 84100 ASSAY OF PHOSPHORUS: CPT

## 2023-01-18 PROCEDURE — 74011250637 HC RX REV CODE- 250/637: Performed by: PODIATRIST

## 2023-01-18 PROCEDURE — 36415 COLL VENOUS BLD VENIPUNCTURE: CPT

## 2023-01-18 RX ORDER — AMLODIPINE BESYLATE 5 MG/1
10 TABLET ORAL DAILY
Status: DISCONTINUED | OUTPATIENT
Start: 2023-01-19 | End: 2023-01-19 | Stop reason: HOSPADM

## 2023-01-18 RX ADMIN — SODIUM CHLORIDE, PRESERVATIVE FREE 10 ML: 5 INJECTION INTRAVENOUS at 06:42

## 2023-01-18 RX ADMIN — SODIUM CHLORIDE, POTASSIUM CHLORIDE, SODIUM LACTATE AND CALCIUM CHLORIDE 100 ML/HR: 600; 310; 30; 20 INJECTION, SOLUTION INTRAVENOUS at 18:22

## 2023-01-18 RX ADMIN — PIPERACILLIN AND TAZOBACTAM 3.38 G: 3; .375 INJECTION, POWDER, FOR SOLUTION INTRAVENOUS at 13:12

## 2023-01-18 RX ADMIN — PIPERACILLIN AND TAZOBACTAM 3.38 G: 3; .375 INJECTION, POWDER, FOR SOLUTION INTRAVENOUS at 06:40

## 2023-01-18 RX ADMIN — Medication 2 UNITS: at 11:52

## 2023-01-18 RX ADMIN — HYDROCODONE BITARTRATE AND ACETAMINOPHEN 1 TABLET: 5; 325 TABLET ORAL at 13:13

## 2023-01-18 RX ADMIN — METFORMIN HYDROCHLORIDE 1000 MG: 500 TABLET ORAL at 07:48

## 2023-01-18 RX ADMIN — INSULIN GLARGINE 15 UNITS: 100 INJECTION, SOLUTION SUBCUTANEOUS at 09:15

## 2023-01-18 RX ADMIN — METFORMIN HYDROCHLORIDE 1000 MG: 500 TABLET ORAL at 16:58

## 2023-01-18 RX ADMIN — Medication 2 UNITS: at 06:40

## 2023-01-18 RX ADMIN — VANCOMYCIN HYDROCHLORIDE 1500 MG: 10 INJECTION, POWDER, LYOPHILIZED, FOR SOLUTION INTRAVENOUS at 18:24

## 2023-01-18 RX ADMIN — VANCOMYCIN HYDROCHLORIDE 1500 MG: 10 INJECTION, POWDER, LYOPHILIZED, FOR SOLUTION INTRAVENOUS at 06:41

## 2023-01-18 RX ADMIN — AMLODIPINE BESYLATE 5 MG: 5 TABLET ORAL at 09:14

## 2023-01-18 RX ADMIN — HYDROCODONE BITARTRATE AND ACETAMINOPHEN 1 TABLET: 5; 325 TABLET ORAL at 03:11

## 2023-01-18 RX ADMIN — HYDROCODONE BITARTRATE AND ACETAMINOPHEN 1 TABLET: 5; 325 TABLET ORAL at 16:58

## 2023-01-18 RX ADMIN — ENOXAPARIN SODIUM 30 MG: 100 INJECTION SUBCUTANEOUS at 22:22

## 2023-01-18 RX ADMIN — SODIUM CHLORIDE, PRESERVATIVE FREE 10 ML: 5 INJECTION INTRAVENOUS at 22:27

## 2023-01-18 RX ADMIN — HYDRALAZINE HYDROCHLORIDE 10 MG: 20 INJECTION INTRAMUSCULAR; INTRAVENOUS at 22:31

## 2023-01-18 RX ADMIN — ENOXAPARIN SODIUM 30 MG: 100 INJECTION SUBCUTANEOUS at 09:15

## 2023-01-18 NOTE — PROGRESS NOTES
[facility] Adult  Hospitalist Group                                                                                          Hospitalist Progress Note  Barb Hampton NP  Answering service: [phone] from in house phone        Date of Service:  2023  NAME:  Nitesh Snow  :  1970  MRN:  [id number]      Admission Summary:     Mr. Antwon Pham is a 59-year-old male with a past medical history significant for type 2 diabetes, tobacco abuse, marijuana use who presented to [facility] emergency room with pain and swelling involving the left fourth toe which started approximately 3 days ago and was getting progressively worse. X-ray in the emergency room shows findings concerning for osteomyelitis of the fourth toe. He was seen by Dr. Ai Velez in the ER who recommended a surgical cure of the osteomyelitis with an amputation of the left fourth toe. He underwent an amputation of his left fourth toe on 2023. The patient reports that he \"weaned himself off of diabetes medications\" because he felt like he was fine and blood sugars were stable. We discussed that likely his blood sugars were stable as a result of the medications that he was taking. Hemoglobin A1c is found to be 8.5. He reports a greater than 100 pound intentional weight loss over the past 1.5 years. Patient denies any pain at this time. He is doing well post operatively. Interval history / Subjective:     Patient doing well today. He is up ambulating in his room without surgical shoe. Educated that he must have this on at all times when out of bed. Awaiting final culture reports from osteo. Long discussion today regarding diabetes compliance. Encourage cessation of cocaine, cigarette smoking, and resumption of diabetes medications. He expresses understanding. He states he was only using cocaine situationally after his father passed at the beginning of this month.  We discussed the tremendous impacts of cocaine on his health and co-morbidities. He was also seen by the diabetes treatment educator today as well. Assessment & Plan:     Left fourth toe osteomyelitis:  - Patient is status post left fourth toe amputation with expected surgical cure of osteo-  -Weightbearing as tolerated to left foot with surgical shoe  - Culture growing light streptococcus mitis/oralis and rare diphtheroids, continue IV antibiotics for now and can likely transition to p.o. for discharge pending final culture results  -Zosyn plus Vanco   - check ECHO with strep viridans group bacteria growing    Hypomagnesemia:   - resolved after one dose of mag oxide     Elevated blood pressure:  - Patient has no documented diagnosis of hypertension however systolic blood pressure has ranged between 145 and 167 throughout admission  - We will start him on Norvasc 5 mg --> increase to 10 mg today   -Not having any pain so I do not think it is pain related    Type 2 diabetes, uncontrolled:   -Patient restarted on metformin 1000 mg 2 times daily  -Patient started on Lantus 15 units nightly  -Diabetes educator consulted  -A1C8.5  -Accu-Cheks and SSI       Code status: Full   Prophylaxis: Lovenox  Care Plan discussed with: Patient and RN   Anticipated Disposition: 24-48 hours      Hospital Problems  Date Reviewed: 1/15/2023            Codes Class Noted POA    * (Principal[facility] ICD-10-CM: M86.9  ICD-9-CM: 730.20  1/15/2023 Yes           Review of Systems:   A comprehensive review of systems was negative except for that written in the HPI. Vital Signs:    Last 24hrs VS reviewed since prior progress note.  Most recent are:  Visit Vitals  BP (!) 152/90 (BP 1 Location: Right upper arm, BP Patient Position: At rest)   Pulse 92   Temp 98.6 °F (37 °C)   Resp 18   Ht 6' 2\" (1.88 m)   Wt 111.1 kg (245 lb)   SpO2 98%   BMI 31.46 kg/m²         Intake/Output Summary (Last 24 hours) at 1/18/2023 1421  Last data filed at 1/18/2023 1105  Gross per 24 hour   Intake 650 ml   Output 2690 ml   Net -2040 ml          Physical Examination:     I had a face to face encounter with this patient and independently examined them on 1/18/2023 as outlined below:          Constitutional:  No acute distress, cooperative, pleasant    ENT:  Oral mucosa moist, oropharynx benign. Resp:  Mild expiratory wheezing noted. + Productive cough. No rhonchi/rales. No accessory muscle use. CV:  Regular rhythm, normal rate, no murmurs, gallops, rubs    GI:  Soft, non distended, non tender. normoactive bowel sounds, no hepatosplenomegaly     Musculoskeletal:  No edema, warm, 2+ pulses throughout    Neurologic:  Moves all extremities. AAOx3, CN II-XII reviewed  Skin: Left foot with post op surgical dressing in place, C/D/I            Data Review:    Review and/or order of clinical lab test  Review and/or order of tests in the radiology section of [facility]      Labs:     Recent Labs     01/18/23 0253 01/17/23  0332   WBC 9.2 9.8   HGB 13.3 12.2   HCT 43.1 38.5    273       Recent Labs     01/18/23  0253 01/17/23  0332 01/16/23  0513    135* 138   K 4.6 4.2 4.4    102 105   CO2 30 28 28   BUN 17 14 15   CREA 0.99 0.91 0.84   * 203* 210*   CA 9.0 8.8 8.7   MG 1.7 1.5* 1.6   PHOS 3.3 3.8 3.7       Recent Labs     01/16/23  0513 01/15/23  1531   ALT 18 20   AP 86 96   TBILI 0.4 0.4   TP 6.0* 7.7   ALB 2.6* 3.1*   GLOB 3.4 4.6*       No results for input(s): INR, PTP, APTT, INREXT, INREXT in the last 72 hours. No results for input(s): FE, TIBC, PSAT, FERR in the last 72 hours. No results found for: FOL, RBCF   No results for input(s): PH, PCO2, PO2 in the last 72 hours. No results for input(s): CPK, CKNDX, TROIQ in the last 72 hours.     No lab exists for component: CPKMB  Lab Results   Component Value Date/Time    Cholesterol, total 200 (H) 07/31/2018 10:05 AM    HDL Cholesterol 50 07/31/2018 10:05 AM    LDL, calculated 96.6 07/31/2018 10:05 AM Triglyceride 267 (H) 07/31/2018 10:05 AM    CHOL/HDL Ratio 4.0 07/31/2018 10:05 AM     Lab Results   Component Value Date/Time    Glucose (POC) 170 (H) 01/18/2023 11:43 AM    Glucose (POC) 159 (H) 01/18/2023 06:02 AM    Glucose (POC) 164 (H) 01/17/2023 09:48 PM    Glucose (POC) 160 (H) 01/17/2023 04:45 PM    Glucose (POC) 233 (H) 01/17/2023 11:25 AM     Lab Results   Component Value Date/Time    Color YELLOW/STRAW 01/16/2023 01:13 AM    Appearance CLEAR 01/16/2023 01:13 AM    Specific gravity 1.012 01/16/2023 01:13 AM    pH (UA) 6.5 01/16/2023 01:13 AM    Protein 30 (A) 01/16/2023 01:13 AM    Glucose Negative 01/16/2023 01:13 AM    Ketone Negative 01/16/2023 01:13 AM    Bilirubin Negative 01/16/2023 01:13 AM    Urobilinogen 0.2 01/16/2023 01:13 AM    Nitrites Negative 01/16/2023 01:13 AM    Leukocyte Esterase Negative 01/16/2023 01:13 AM    Epithelial cells FEW 01/16/2023 01:13 AM    Bacteria Negative 01/16/2023 01:13 AM    WBC 0-4 01/16/2023 01:13 AM    RBC 0-5 01/16/2023 01:13 AM         Medications Reviewed:     Current Facility-Administered Medications   Medication Dose Route Frequency    [START ON 1/19/2023] Vanc random level due 1/19 @ 04:00   Other ONCE    insulin glargine (LANTUS) injection 15 Units  15 Units SubCUTAneous DAILY    metFORMIN (GLUCOPHAGE) tablet 1,000 mg  1,000 mg Oral BID WITH MEALS    amLODIPine (NORVASC) tablet 5 mg  5 mg Oral DAILY    vancomycin (VANCOCIN) 1500 mg in  ml infusion  1,500 mg IntraVENous Q12H    hydrALAZINE (APRESOLINE) 20 mg/mL injection 10 mg  10 mg IntraVENous Q6H PRN    enoxaparin (LOVENOX) injection 30 mg  30 mg SubCUTAneous Q12H    HYDROcodone-acetaminophen (NORCO) 5-325 mg per tablet 1 Tablet  1 Tablet Oral Q4H PRN    sodium chloride (NS) flush 5-40 mL  5-40 mL IntraVENous Q8H    sodium chloride (NS) flush 5-40 mL  5-40 mL IntraVENous PRN    acetaminophen (TYLENOL) tablet 650 mg  650 mg Oral Q6H PRN    Or    acetaminophen (TYLENOL) suppository 650 mg  650 mg Rectal Q6H PRN    polyethylene glycol (MIRALAX) packet 17 g  17 g Oral DAILY PRN    ondansetron (ZOFRAN ODT) tablet 4 mg  4 mg Oral Q8H PRN    Or    ondansetron (ZOFRAN) injection 4 mg  4 mg IntraVENous Q6H PRN    insulin lispro (HUMALOG) injection   SubCUTAneous AC&HS    glucose chewable tablet 16 g  4 Tablet Oral PRN    glucagon (GLUCAGEN) injection 1 mg  1 mg IntraMUSCular PRN    dextrose 10 % infusion 0-250 mL  0-250 mL IntraVENous PRN    morphine injection 2 mg  2 mg IntraVENous Q4H PRN    lactated Ringers infusion  100 mL/hr IntraVENous CONTINUOUS    Vancomycin- pharmacy to dose   Other Rx Dosing/Monitoring    piperacillin-tazobactam (ZOSYN) 3.375 g in 0.9% sodium chloride (MBP/ADV) 100 mL MBP  3.375 g IntraVENous Q8H     ______________________________________________________________________  EXPECTED LENGTH OF STAY: 2d 21h  ACTUAL LENGTH OF STAY:          Lindy 2, NP

## 2023-01-18 NOTE — DIABETES MGMT
[facility]  PROGRAM FOR DIABETES HEALTH  DIABETES MANAGEMENT CONSULT    Consulted by  Estelle Arciniega NP   for advanced nursing evaluation and care for inpatient blood glucose management. Evaluation and Action Plan   Jt Denton is a 46year old male, with Type 2 Diabetes off antihyperglycemic agents, who is admitted with left toe osteomyelitis s/p amputation. A1C on admission was elevated at 8.5% and glycemic and low dose glargine/metformin resumed at admission. With this therapy, BG largely at inpatient target of 140-180mg/dl. Please continue and strive for excellent glucose control in the setting of foot wounds. Juan Tse has several factors that impact diabetes control including inconsistent housing over the last 2 years, limited finances and difficulty affording insulin in the past (now has insurance), limited follow up with outpatient providers, and poor judgement. He will need support for diabetes management while admitted and followed in the outpatient setting. Management Rationale Action Plan   Medication   Basal needs Using low dose 0.2 units/kg/D based on weight, Fasting BG level, amt of correction  Continued Lantus 15 units daily as fasting at goal.  If fasting BG rises, please increase Lantus to low dose 20 units daily. Nutritional needs N/A If continues to have pre-prandial hyperglycemia despite basal/metformin, add 5 units Humalog/meal, Low dose   Corrective insulin Using normal sensitivity Normal sensitivity ACHS   Additional orders   Would adjust to 4 CHO choices, 60g CHO/meal    Please hold metformin if not eating well, NPO or   needs to have CT contrast.       Discharge planning   Medication  Resume Metformin ER at inpatient dose  Would recommend low dose Lantus at hospital based dose.   Glargine PEN  Pen Needle, Diabetic 32 Gauge x 5/32\" (1 box)     Referral  [x]        Outpatient diabetes education: Will enter referral close to discharge   Additional orders  Advised to stop smoking/illicit drugs    Need a new prescription for glucometer kit (Meter, Lancets (100), Strips (100)). Patient to obtain a blood glucose reading two times daily and as needed. First thing in the morning prior to eating and drinking anything then before bedtime. Create a log and present to PCP for interpretation. Initial Presentation   Eliberto Bernheim is a 46 y.o. male who presented to the ED 1/15/23 with a c/o left 4th with progressive worsening toe pain and swelling over the last 4 days. LAB: , UDS positive for cocaine  Foot XR: Findings concerning for osteomyelitis of the fourth digit. HX:   Past Medical History:   Diagnosis Date    Diabetes ([facility]         INITIAL DX:   Osteomyelitis ([facility] [M86.9]     Current Treatment     TX: IV antibiotics, Podiatry consult with amputation of L 4th toe. Hospital Course   Clinical progress has been uncomplicated. 1:15: Admission   1/16: Seen by podiatry  1/17: Amputation of left fourth toe  Diabetes History   Type 2 Diabetes: Diagnosed about 5-6 years ago after a hospital admission for HHS. Was started on metformin/lantus and victoza. Did participate in diabetes self management training at [facility]. Was intentional in diabetes care, diet and activity and lost over 115 lbs in 2.5 years. \"My numbers were better so I stopped my medicine\". ED notes 6/21- arrived with hyperglcemia was that he was homeless and couldn't afford his insulin.         Diabetes-related Medical History  Neurological complications  Peripheral neuropathy  Macrovascular disease  Foot wounds  Other associated conditions     Drug use    Diabetes Medication History  Key Antihyperglycemic Medications               Lantus Solostar U-100 Insulin 100 unit/mL (3 mL) inpn (Taking) inject 12 units subcutaneously once daily as directed    insulin glargine (LANTUS,BASAGLAR) 100 unit/mL (3 mL) inpn (Taking) Inject 10 units subcutaneously every day    Victoza 3-Josh 0.6 mg/0.1 mL (18 mg/3 mL) pnij INJECT 0.6MG SUBCUTANEOUSLY ONCE DAILY FOR 1 WEEK, THEN INCREASE . ..  (REFER TO PRESCRIPTION NOTES). metFORMIN (GLUCOPHAGE) 1,000 mg tablet Take 1 Tablet by mouth two (2) times daily (with meals). metFORMIN (GLUCOPHAGE) 1,000 mg tablet Take 1,000 mg by mouth two (2) times daily (with meals). NOT taking any antihyperglycemic agents. Stopped 1 year ago. \"My numbers were better and I thought I didn't need it\"    Diabetes self-management practices:   Eating pattern     [x] Breakfast  Eggs/Messer or fried fish and vegetables  [x] Lunch   Skips  [x] Dinner   Hamburger/fries or meatloaf  [x] Bedtime  Chips, cookies, orieos, cupcakes  [x] Snacks   Same as above,  Multiple snacks daily  [x] Beverages  Regular or diet soda, juice, water  Physical activity pattern  Limited intentional activity  Monitoring pattern   Does not have a glucometer   Taking medications pattern  [x] In-consistent administration: Not taking  [] Affordable  Social determinants of health impacting diabetes self-management practices   Worried that housing situation is unstable and Concerned that you need to know more about how to stay healthy with diabetes  Overall evaluation:    [x] Not achieving A1c target with drug therapy & self-care practices    - lives with 3year old daughter  Does not work  Current Every Day Smoker  ED visit July 21 for wound check on right upper buttock: D/C with oral abox and June 21 for hyperglycemia   Subjective   I need to take medicine?      Objective   Physical exam  General Overweight AA male in no acute distress/ill-appearing. Conversant and cooperative  Neuro  Alert, oriented   Vital Signs Visit Vitals  BP (!) 175/93 (BP 1 Location: Right upper arm, BP Patient Position: At rest)   Pulse 88   Temp 98 °F (36.7 °C)   Resp 16   Ht 6' 2\" (1.88 m)   Wt 111.1 kg (245 lb)   SpO2 99%   BMI 31.46 kg/m²     Skin  Warm and dry. No acanthosis noted along neckline.  No lipohypertrophy or lipoatrophy noted at injection sites   Heart   Regular rate and rhythm. No murmurs, rubs or gallops  Lungs  Clear to auscultation without rales or rhonchi  Extremities No foot wounds        Laboratory  Recent Labs     23  0253 23  0332 23  0513 01/15/23  1531   * 203* 210* 218*   AGAP 4* 5 5 5   WBC 9.2 9.8 8.4 10.2   CREA 0.99 0.91 0.84 0.93   AST  --   --  11* 14*   ALT  --   --  18 20       Factors impacting BG management  Factor Dose Comments   Nutrition:  Standard meals     60 grams/meal      Pain Foot    Infection Left 4th toe osteomyelitis  OR cx with light probable alpha streptococcus     Other:   Kidney function GFR over 60      Blood glucose pattern    Significant diabetes-related events over the past 24-72 hours  A1C 8.5%  Fasting B  Pre-prandial: 160-233  Basal: Lantus 15 units  Bolus: None ordered  Correction: 7 units in the last 24h  Metformin 1000mg BID  Awaiting final path from OR cultures    Assessment and Nursing Intervention   Nursing Diagnosis Risk for unstable blood glucose pattern   Nursing Intervention Domain 5250 Decision-making Support   Nursing Interventions Examined current inpatient diabetes/blood glucose control   Explored factors facilitating and impeding inpatient management  Explored corrective strategies with patient and responsible inpatient provider   Informed patient of rational for insulin strategy while hospitalized     Nursing Diagnosis 19008 Ineffective Health Management   Nursing Intervention Domain 5250 Decision-making Support   Nursing Interventions Identified diabetes self-management practices impeding diabetes control  Discussed diabetes survival skills related to  Reviewed diagnosis of Type 2 Diabetes. Discussed normal and abnormal lab values. Discussed current A1C/BG and goals.   Healthy Plate eating plan; given handouts  Role of physical activity in improving insulin sensitivity and action  Procedure for blood glucose monitoring & options for low-cost products  Medications plan at discharge     Billing Code(s)     [x] 04342 IP initial hospital care - 70 minutes     Before making these care recommendations, I personally reviewed the hospitalization record, including notes, laboratory & diagnostic data and current medications, and examined the patient at the bedside (circumstances permitting) before determining care. More than fifty (50) percent of the time was spent in patient counseling and/or care coordination.   Total minutes: 75    Gris Huerta CNS  Diabetes Clinical Nurse Specialist  Program for Diabetes Health  Access via [employer]

## 2023-01-18 NOTE — PROGRESS NOTES
Progress Note    Patient: Abhishek Camilo MRN: [id number]  SSN: [id number]    YOB: 1970  Age: 46 y.o. Sex: male      Admit Date: 1/15/2023  2 Day Post-Op     Procedure:   Procedure(s):  LEFT 4TH TOE AMPUTATION    Subjective:     Patient seen resting quiet and comfortably and no apparent distress. Has received surgical shoe. Objective:     Visit Vitals  BP (!) 165/98   Pulse 98   Temp 98.4 °F (36.9 °C)   Resp 18   Ht 6' 2\" (1.88 m)   Wt 111.1 kg (245 lb)   SpO2 99%   BMI 31.46 kg/m²        Physical Exam:  Left foot: Surgical dressing clean/dry and intact. Labs/Radiology Review: images and reports reviewed    Assessment:     Hospital Problems  Date Reviewed: 1/15/2023            Codes Class Noted POA    * (Principal) [facility] ICD-10-CM: M86.9  ICD-9-CM: 730.20  1/15/2023 Yes           Plan/Recommendations/Medical Decision Making:   -Dressing left intact today.  -Weightbearing as tolerated to left foot with surgical shoe. -Expected surgical cure. Infection at level of middle phalanx/PIPJ and level of resection was proximally at the MPJ.  -Toe sent for pathology and culture. -Culture growing occasional GNR, light probable alpha strep. Patient will likely be stable for d/c on po abx once cultures final as cellulitis is already resolved following amputation.  -Continue to follow.  -Continue IV abx.

## 2023-01-19 ENCOUNTER — APPOINTMENT (OUTPATIENT)
Dept: NON INVASIVE DIAGNOSTICS | Age: 53
DRG: 314 | End: 2023-01-19
Attending: NURSE PRACTITIONER
Payer: COMMERCIAL

## 2023-01-19 VITALS
HEART RATE: 106 BPM | HEIGHT: 74 IN | RESPIRATION RATE: 18 BRPM | DIASTOLIC BLOOD PRESSURE: 84 MMHG | SYSTOLIC BLOOD PRESSURE: 147 MMHG | OXYGEN SATURATION: 98 % | BODY MASS INDEX: 31.43 KG/M2 | TEMPERATURE: 98.3 F | WEIGHT: 244.93 LBS

## 2023-01-19 PROBLEM — M86.9 OSTEOMYELITIS (HCC): Status: RESOLVED | Noted: 2023-01-15 | Resolved: 2023-01-19

## 2023-01-19 LAB
ANION GAP SERPL CALC-SCNC: 4 MMOL/L (ref 5–15)
BACTERIA SPEC CULT: ABNORMAL
BACTERIA SPEC CULT: ABNORMAL
BUN SERPL-MCNC: 16 MG/DL (ref 6–20)
BUN/CREAT SERPL: 17 (ref 12–20)
CALCIUM SERPL-MCNC: 8.9 MG/DL (ref 8.5–10.1)
CHLORIDE SERPL-SCNC: 104 MMOL/L (ref 97–108)
CO2 SERPL-SCNC: 30 MMOL/L (ref 21–32)
CREAT SERPL-MCNC: 0.92 MG/DL (ref 0.7–1.3)
ECHO AO ROOT DIAM: 3.7 CM
ECHO AO ROOT INDEX: 1.56 CM/M2
ECHO AV PEAK GRADIENT: 6 MMHG
ECHO AV PEAK VELOCITY: 1.2 M/S
ECHO AV VELOCITY RATIO: 0.83
ECHO EST RA PRESSURE: 3 MMHG
ECHO LA DIAMETER INDEX: 1.52 CM/M2
ECHO LA DIAMETER: 3.6 CM
ECHO LA TO AORTIC ROOT RATIO: 0.97
ECHO LA VOL 2C: 44 ML (ref 18–58)
ECHO LA VOL 4C: 49 ML (ref 18–58)
ECHO LA VOLUME AREA LENGTH: 52 ML
ECHO LA VOLUME INDEX A2C: 19 ML/M2 (ref 16–34)
ECHO LA VOLUME INDEX A4C: 21 ML/M2 (ref 16–34)
ECHO LA VOLUME INDEX AREA LENGTH: 22 ML/M2 (ref 16–34)
ECHO LV E' LATERAL VELOCITY: 7 CM/S
ECHO LV E' SEPTAL VELOCITY: 5 CM/S
ECHO LV FRACTIONAL SHORTENING: 43 % (ref 28–44)
ECHO LV INTERNAL DIMENSION DIASTOLE INDEX: 1.69 CM/M2
ECHO LV INTERNAL DIMENSION DIASTOLIC: 4 CM (ref 4.2–5.9)
ECHO LV INTERNAL DIMENSION SYSTOLIC INDEX: 0.97 CM/M2
ECHO LV INTERNAL DIMENSION SYSTOLIC: 2.3 CM
ECHO LV IVSD: 1.5 CM (ref 0.6–1)
ECHO LV MASS 2D: 284.5 G (ref 88–224)
ECHO LV MASS INDEX 2D: 120 G/M2 (ref 49–115)
ECHO LV POSTERIOR WALL DIASTOLIC: 1.9 CM (ref 0.6–1)
ECHO LV RELATIVE WALL THICKNESS RATIO: 0.95
ECHO LVOT PEAK GRADIENT: 4 MMHG
ECHO LVOT PEAK VELOCITY: 1 M/S
ECHO MV A VELOCITY: 0.83 M/S
ECHO MV AREA PHT: 3 CM2
ECHO MV E DECELERATION TIME (DT): 250.2 MS
ECHO MV E VELOCITY: 0.66 M/S
ECHO MV E/A RATIO: 0.8
ECHO MV E/E' LATERAL: 9.43
ECHO MV E/E' RATIO (AVERAGED): 11.31
ECHO MV E/E' SEPTAL: 13.2
ECHO MV PRESSURE HALF TIME (PHT): 72.6 MS
ECHO MV REGURGITANT PEAK GRADIENT: 74 MMHG
ECHO MV REGURGITANT PEAK VELOCITY: 4.3 M/S
ECHO PV MAX VELOCITY: 1.3 M/S
ECHO PV PEAK GRADIENT: 7 MMHG
ECHO RIGHT VENTRICULAR SYSTOLIC PRESSURE (RVSP): 34 MMHG
ECHO RV TAPSE: 2.5 CM (ref 1.7–?)
ECHO TV REGURGITANT MAX VELOCITY: 2.77 M/S
ECHO TV REGURGITANT PEAK GRADIENT: 31 MMHG
GLUCOSE BLD STRIP.AUTO-MCNC: 127 MG/DL (ref 65–117)
GLUCOSE BLD STRIP.AUTO-MCNC: 176 MG/DL (ref 65–117)
GLUCOSE SERPL-MCNC: 170 MG/DL (ref 65–100)
GRAM STN SPEC: ABNORMAL
GRAM STN SPEC: ABNORMAL
POTASSIUM SERPL-SCNC: 4 MMOL/L (ref 3.5–5.1)
SERVICE CMNT-IMP: ABNORMAL
SODIUM SERPL-SCNC: 138 MMOL/L (ref 136–145)
VANCOMYCIN SERPL-MCNC: 18.3 UG/ML

## 2023-01-19 PROCEDURE — 74011000258 HC RX REV CODE- 258: Performed by: INTERNAL MEDICINE

## 2023-01-19 PROCEDURE — 80048 BASIC METABOLIC PNL TOTAL CA: CPT

## 2023-01-19 PROCEDURE — 74011250637 HC RX REV CODE- 250/637: Performed by: PODIATRIST

## 2023-01-19 PROCEDURE — 74011000250 HC RX REV CODE- 250: Performed by: INTERNAL MEDICINE

## 2023-01-19 PROCEDURE — 74011636637 HC RX REV CODE- 636/637: Performed by: NURSE PRACTITIONER

## 2023-01-19 PROCEDURE — 80202 ASSAY OF VANCOMYCIN: CPT

## 2023-01-19 PROCEDURE — 93306 TTE W/DOPPLER COMPLETE: CPT

## 2023-01-19 PROCEDURE — 93306 TTE W/DOPPLER COMPLETE: CPT | Performed by: INTERNAL MEDICINE

## 2023-01-19 PROCEDURE — 74011250636 HC RX REV CODE- 250/636: Performed by: INTERNAL MEDICINE

## 2023-01-19 PROCEDURE — 82962 GLUCOSE BLOOD TEST: CPT

## 2023-01-19 PROCEDURE — 36415 COLL VENOUS BLD VENIPUNCTURE: CPT

## 2023-01-19 PROCEDURE — 74011250637 HC RX REV CODE- 250/637: Performed by: NURSE PRACTITIONER

## 2023-01-19 PROCEDURE — 74011636637 HC RX REV CODE- 636/637: Performed by: INTERNAL MEDICINE

## 2023-01-19 RX ORDER — METFORMIN HYDROCHLORIDE 1000 MG/1
1000 TABLET ORAL 2 TIMES DAILY WITH MEALS
Qty: 60 TABLET | Refills: 0 | Status: SHIPPED | OUTPATIENT
Start: 2023-01-19 | End: 2023-01-19 | Stop reason: SDUPTHER

## 2023-01-19 RX ORDER — INSULIN GLARGINE 100 [IU]/ML
15 INJECTION, SOLUTION SUBCUTANEOUS DAILY
Qty: 5 PEN | Refills: 0 | Status: SHIPPED | OUTPATIENT
Start: 2023-01-19 | End: 2023-01-19 | Stop reason: SDUPTHER

## 2023-01-19 RX ORDER — AMOXICILLIN AND CLAVULANATE POTASSIUM 875; 125 MG/1; MG/1
1 TABLET, FILM COATED ORAL EVERY 12 HOURS
Qty: 20 TABLET | Refills: 0 | Status: SHIPPED | OUTPATIENT
Start: 2023-01-19 | End: 2023-01-19 | Stop reason: SDUPTHER

## 2023-01-19 RX ORDER — INSULIN GLARGINE 100 [IU]/ML
15 INJECTION, SOLUTION SUBCUTANEOUS DAILY
Qty: 1 ML | Refills: 0 | Status: SHIPPED | OUTPATIENT
Start: 2023-01-19

## 2023-01-19 RX ORDER — AMLODIPINE BESYLATE 10 MG/1
10 TABLET ORAL DAILY
Qty: 30 TABLET | Refills: 0 | Status: SHIPPED | OUTPATIENT
Start: 2023-01-20 | End: 2023-01-19 | Stop reason: SDUPTHER

## 2023-01-19 RX ORDER — AMOXICILLIN AND CLAVULANATE POTASSIUM 875; 125 MG/1; MG/1
1 TABLET, FILM COATED ORAL EVERY 12 HOURS
Qty: 20 TABLET | Refills: 0 | Status: SHIPPED | OUTPATIENT
Start: 2023-01-19 | End: 2023-01-29

## 2023-01-19 RX ORDER — METFORMIN HYDROCHLORIDE 1000 MG/1
1000 TABLET ORAL 2 TIMES DAILY WITH MEALS
Qty: 60 TABLET | Refills: 0 | Status: SHIPPED | OUTPATIENT
Start: 2023-01-19

## 2023-01-19 RX ORDER — AMLODIPINE BESYLATE 10 MG/1
10 TABLET ORAL DAILY
Qty: 30 TABLET | Refills: 0 | Status: SHIPPED | OUTPATIENT
Start: 2023-01-20

## 2023-01-19 RX ORDER — HYDROCODONE BITARTRATE AND ACETAMINOPHEN 5; 325 MG/1; MG/1
1 TABLET ORAL
Qty: 12 TABLET | Refills: 0 | Status: SHIPPED | OUTPATIENT
Start: 2023-01-19 | End: 2023-01-19 | Stop reason: SDUPTHER

## 2023-01-19 RX ORDER — HYDROCODONE BITARTRATE AND ACETAMINOPHEN 5; 325 MG/1; MG/1
1 TABLET ORAL
Qty: 12 TABLET | Refills: 0 | Status: SHIPPED | OUTPATIENT
Start: 2023-01-19 | End: 2023-01-23

## 2023-01-19 RX ORDER — INSULIN GLARGINE 100 [IU]/ML
15 INJECTION, SOLUTION SUBCUTANEOUS DAILY
Qty: 5 PEN | Refills: 0 | Status: SHIPPED | OUTPATIENT
Start: 2023-01-19 | End: 2023-01-19

## 2023-01-19 RX ADMIN — Medication 2 UNITS: at 06:35

## 2023-01-19 RX ADMIN — AMLODIPINE BESYLATE 10 MG: 5 TABLET ORAL at 09:40

## 2023-01-19 RX ADMIN — VANCOMYCIN HYDROCHLORIDE 1500 MG: 10 INJECTION, POWDER, LYOPHILIZED, FOR SOLUTION INTRAVENOUS at 06:35

## 2023-01-19 RX ADMIN — INSULIN GLARGINE 15 UNITS: 100 INJECTION, SOLUTION SUBCUTANEOUS at 09:44

## 2023-01-19 RX ADMIN — PIPERACILLIN AND TAZOBACTAM 3.38 G: 3; .375 INJECTION, POWDER, FOR SOLUTION INTRAVENOUS at 00:44

## 2023-01-19 RX ADMIN — ENOXAPARIN SODIUM 30 MG: 100 INJECTION SUBCUTANEOUS at 09:42

## 2023-01-19 RX ADMIN — SODIUM CHLORIDE, PRESERVATIVE FREE 10 ML: 5 INJECTION INTRAVENOUS at 06:38

## 2023-01-19 RX ADMIN — HYDROCODONE BITARTRATE AND ACETAMINOPHEN 1 TABLET: 5; 325 TABLET ORAL at 15:23

## 2023-01-19 RX ADMIN — PIPERACILLIN AND TAZOBACTAM 3.38 G: 3; .375 INJECTION, POWDER, FOR SOLUTION INTRAVENOUS at 09:40

## 2023-01-19 RX ADMIN — METFORMIN HYDROCHLORIDE 1000 MG: 500 TABLET ORAL at 07:20

## 2023-01-19 RX ADMIN — HYDROCODONE BITARTRATE AND ACETAMINOPHEN 1 TABLET: 5; 325 TABLET ORAL at 00:42

## 2023-01-19 NOTE — DISCHARGE SUMMARY
Discharge Summary     PATIENT ID: Lisa Rosado  MRN: [id number]   YOB: 1970    DATE OF ADMISSION: 1/15/2023  1:51 PM    DATE OF DISCHARGE: 1/19/2023  PRIMARY CARE PROVIDER: Milton Mckenzie MD   ATTENDING PHYSICIAN: Esperanza Monroe MD  DISCHARGING PROVIDER: Ramiro Carpio NP      CONSULTATIONS: IP CONSULT TO PODIATRY    PROCEDURES/SURGERIES: Procedure(s):  LEFT 4TH  [address] COURSE:   Mr. Yves De Los Santos is a 59-year-old male with a past medical history significant for type 2 diabetes, tobacco abuse, marijuana use who presented to the ED with pain and swelling involving the left fourth toe which started ~ 3 days ago and was getting progressively worse. X-ray in the ED showed findings concerning for OM of the fourth toe. He was seen by Dr. Zhou Lora in the ER who recommended an amputation of the left fourth toe. He had an amputation of his left fourth toe on 1/16/2023. Other hx: patient reported he \"weaned himself off of diabetes medications\" because he felt like he was fine and blood sugars were stable. Hemoglobin A1c was found to be 8.5. He reports a greater than 100 pound intentional weight loss over the past 1.5 years.       DISCHARGE DIAGNOSES / PLAN:      Left fourth toe osteomyelitis:  - s/p L fourth toe amputation with expected surgical cure of osteo  - Weightbearing as tolerated to left foot with surgical shoe  - Culture growing light streptococcus mitis/oralis and rare diphtheroids, plans to discharge on 10 more days of Augmentin.  - ECHO did not show any vegetation  - Podiatry has cleared for d/c today, pt to follow-up with podiatry in 10 to 14 days     Hypomagnesemia:   - resolved after one dose of mag oxide      Elevated blood pressure:  - Patient has no documented diagnosis of hypertension however BP elevated this admit  - We will start him on Norvasc 5 mg --> increase to 10 mg today   - Patient will need to follow-up with PCP for dosage adjustment or additional medications     Type 2 diabetes, uncontrolled:   - Patient restarted on metformin 1000 mg 2 times daily  - Patient on Lantus 15 units nightly  - Diabetes educator consulted and has seen. Glucose 127-176  - A1c 8.5    BMI: Body mass index is 31.45 kg/m². . This patient: Meets criteria for obesity given BMI >/= 30 and < 40 due to excess calories/nutritional. Weight loss and lifestyle modifications should be encouraged as an outpatient. PENDING TEST RESULTS:   At the time of discharge the following test results are still pending: none     ADDITIONAL CARE RECOMMENDATIONS:   - Follow-up with Dr. Artuor Robins in office 10-14 days.  - WBAT in surgical shoes. - Keep dressing clean, dry, and intact. Does not need to change before coming to Podiatry appt    DIET: Cardiac Diet and Diabetic Diet    ACTIVITY: Activity as tolerated    EQUIPMENT needed: Surgical Shoe    NOTIFY YOUR PHYSICIAN FOR ANY OF THE FOLLOWING:   Fever over 101 degrees for 24 hours. Chest pain, shortness of breath, fever, chills, nausea, vomiting, diarrhea, change in mentation, falling, weakness, bleeding. Severe pain or pain not relieved by medications, as well as any other signs or symptoms that you may have questions about. FOLLOW UP APPOINTMENTS:    Follow-up Information       Follow up With Specialties Details Why Bianca Obrien MD [facility]   [address] [place] [id number]  [address], [place] [address], DPM Foot and Ankle Surgery Follow up Follow up in 10-14 days [address]  [phone]            DISCHARGE MEDICATIONS:  Discharge Medication List as of 1/19/2023  2:28 PM        START taking these medications    Details   HYDROcodone-acetaminophen (NORCO) 5-325 mg per tablet Take 1 Tablet by mouth every six (6) hours as needed for Pain for up to 12 doses. Max Daily Amount: 4 Tablets. , Print, Disp-12 Tablet, R-0      amLODIPine (NORVASC) 10 mg tablet Take 1 Tablet by mouth daily. Indications: high blood pressure, Print, Disp-30 Tablet, R-0           CONTINUE these medications which have CHANGED    Details   insulin glargine (LANTUS,BASAGLAR) 100 unit/mL (3 mL) inpn 15 Units by SubCUTAneous route daily. Inject 10 units subcutaneously every day, Print, Disp-5 Pen, R-0           CONTINUE these medications which have NOT CHANGED    Details   metFORMIN (GLUCOPHAGE) 1,000 mg tablet Take 1 Tablet by mouth two (2) times daily (with meals). , Print, Disp-60 Tablet, R-0           STOP taking these medications       Victoza 3-Josh 0.6 mg/0.1 mL (18 mg/3 mL) pnij Comments:   Reason for Stopping:         Insulin Needles, Disposable, 31 gauge x 5/16\" ndle Comments:   Reason for Stopping:         OTHER Comments:   Reason for Stopping:             Added to discharge, augmentin 875/125 BD x 10 days    DISPOSITION:    Home With:   OT  PT  HH  RN       Long term SNF/Inpatient Rehab    Independent/assisted living    Hospice   xx Other: Home     PATIENT CONDITION AT DISCHARGE:   Functional status    Poor     Deconditioned    xx Independent      Cognition    xx Lucid     Forgetful     Dementia      Catheters/lines (plus indication)    [place]     PICC     PEG    xx None      Code status   xx  Full code     DNR      PHYSICAL EXAMINATION AT DISCHARGE:    Patient was seen and examined this afternoon, he was lying in bed no acute distress. Cooperative and interactive with assessment. Patient has been cleared by podiatry for discharge and patient very anxious to leave. Discharge instructions were discussed with him, will be starting on antibiotics x10 days as well as a new antihypertensive and pain medications. He will also be given a prescription for Lantus insulin and was encouraged to take this as directed and to check his blood sugars at least twice daily. He is to go to [facility] for a glucometer for blood sugar monitoring.     Patient was discharged prior to completing the prescription for antibiotics. Patient was contacted through assistance of case management, all prescriptions were sent to [facility] pharmacy. He will be there to pick those up later on this evening. General:          Alert, cooperative, no distress, appears stated age. HEENT:           Mucous membranes moist.  Neck:               Trachea midline. Lungs:             Clear to auscultation bilaterally. Oxygen sats 97% on room air  Heart:              Regular  rhythm,  No  murmur. Heart rate 97  Abdomen:        Soft, non-tender. Not distended. Bowel sounds normal  Extremities:     Moves all extremities. No edema. Left foot has intact bandage with Ace wrap. Moves toes. Skin:                Not pale. No rashes   Psych:             Not anxious or agitated.   Neurologic:      Alert, moves all extremities, answers questions appropriately and responds to commands     CHRONIC MEDICAL DIAGNOSES:  Problem List as of 1/19/2023 Date Reviewed: 1/19/2023            Codes Class Noted - Resolved    * (Principal) RESOLVED: [facility] ICD-10-CM: M86.9  ICD-9-CM: 730.20  1/15/2023 - 1/19/2023         Greater than 30 minutes were spent with the patient on counseling and coordination of care    Signed:   David Hernandez NP  1/19/2023  4:00 PM

## 2023-01-19 NOTE — DISCHARGE INSTRUCTIONS
Discharge Instructions     PATIENT ID: Fili Collier  MRN: [id number]   YOB: 1970    DATE OF ADMISSION: 1/15/2023   DATE OF DISCHARGE: 1/19/2023  PRIMARY CARE PROVIDER: Samira Ortega   ATTENDING PHYSICIAN: Jesse Butcher MD  DISCHARGING PROVIDER: Trinidad Chaudhry NP      DISCHARGE DIAGNOSES   Osteomyelitis    CONSULTATIONS: Podiatry    PROCEDURES/SURGERIES: Procedure(s):  LEFT 4TH TOE AMPUTATION    PENDING TEST RESULTS:   At the time of discharge the following test results are still pending: none    FOLLOW UP APPOINTMENTS:   Follow-up Information       Follow up With Specialties Details Why Nadeem Navarro MD [facility]   [address] [facility] [address] [facility] [id number] [address], [place] [address], DPM Foot and Ankle Surgery Follow up Follow up in 10-14 days [address] [place] [id number] [facility]  [phone]            ADDITIONAL CARE RECOMMENDATIONS:   - Follow-up with Dr. Francisco Hernandez in office 10-14 days.  - WBAT in surgical shoes. - Keep dressing clean, dry, and intact. Does not need to change before coming to Podiatry appt    DIET: Cardiac Diet and Diabetic Diet    ACTIVITY: Activity as tolerated    EQUIPMENT needed: Surgical Shoe    DISCHARGE MEDICATIONS:   See Medication Reconciliation Form    It is important that you take the medication exactly as they are prescribed. Keep your medication in the bottles provided by the pharmacist and keep a list of the medication names, dosages, and times to be taken in your wallet. Do not take other medications without consulting your doctor. NOTIFY YOUR PHYSICIAN FOR ANY OF THE FOLLOWING:   Fever over 101 degrees for 24 hours. Chest pain, shortness of breath, fever, chills, nausea, vomiting, diarrhea, change in mentation, falling, weakness, bleeding. Severe pain or pain not relieved by medications. Or, any other signs or symptoms that you may have questions about.     DISPOSITION:    Home With: OT  PT  [facility]  RN       SNF/Inpatient Rehab/LTAC    Independent/assisted living    Hospice   xx Other: Home     CDMP Checked:   Yes xx     PROBLEM LIST Updated:  Yes xx     Signed:   Cesar Rendon NP  1/19/2023  4:18 PM

## 2023-01-19 NOTE — PROGRESS NOTES
Transition of Care  RUR 4% Low  Disposition Home  DME None  Transportation Round [place]  3:30pm  Follow Up PCP, Specialist    CM completed Round Trip with 3:30  to transport patient to the Extended Stay [address] [place] [address] located at [facility]. Patient has no other identified CM needs.      Torri Patterson MS

## 2023-01-19 NOTE — PROGRESS NOTES
I have reviewed discharge instructions with the patient. Discharge medications were reviewed with the patient and appropriate educational materials and side effects teaching were provided to the patient. The patient verbalized understanding and had no further questions. IV was removed and patient was discharged to [facility] via Transportation round. Current Discharge Medication List        START taking these medications    Details   HYDROcodone-acetaminophen (NORCO) 5-325 mg per tablet Take 1 Tablet by mouth every six (6) hours as needed for Pain for up to 12 doses. Max Daily Amount: 4 Tablets. Qty: 12 Tablet, Refills: 0  Start date: 1/19/2023, End date: 1/23/2023    Associated Diagnoses: Other osteomyelitis of left foot (HCC)      amLODIPine (NORVASC) 10 mg tablet Take 1 Tablet by mouth daily. Indications: high blood pressure  Qty: 30 Tablet, Refills: 0  Start date: 1/20/2023           CONTINUE these medications which have CHANGED    Details   insulin glargine (LANTUS,BASAGLAR) 100 unit/mL (3 mL) inpn 15 Units by SubCUTAneous route daily. Inject 10 units subcutaneously every day  Qty: 5 Pen, Refills: 0  Start date: 1/19/2023           CONTINUE these medications which have NOT CHANGED    Details   metFORMIN (GLUCOPHAGE) 1,000 mg tablet Take 1 Tablet by mouth two (2) times daily (with meals).   Qty: 60 Tablet, Refills: 0           STOP taking these medications       Victoza 3-Josh 0.6 mg/0.1 mL (18 mg/3 mL) pnij Comments:   Reason for Stopping:         Insulin Needles, Disposable, 31 gauge x 5/16\" ndle Comments:   Reason for Stopping:         OTHER Comments:   Reason for Stopping:

## 2023-01-19 NOTE — PROGRESS NOTES
Progress Note    Patient: Brien Denton MRN: [id number]  SSN: [id number]    YOB: 1970  Age: 46 y.o. Sex: male      Admit Date: 1/15/2023  3 Day Post-Op     Procedure:   Procedure(s):  LEFT 4TH TOE AMPUTATION    Subjective:     Patient seen resting quiet and comfortably and no apparent distress. Filomena Asper to go home. Objective:     Visit Vitals  BP (!) 147/84   Pulse (!) 106   Temp 98.3 °F (36.8 °C)   Resp 18   Ht 6' 2\" (1.88 m)   Wt 111.1 kg (244 lb 14.9 oz)   SpO2 98%   BMI 31.45 kg/m²        Physical Exam:  Left foot: Surgical dressing clean/dry and intact. Labs/Radiology Review: images and reports reviewed    Assessment:     Hospital Problems  Date Reviewed: 1/15/2023            Codes Class Noted POA    * (Principal) [facility] ICD-10-CM: M86.9  ICD-9-CM: 730.20  1/15/2023 Yes           Plan/Recommendations/Medical Decision Making:   -Dressing left intact today.  -Weightbearing as tolerated to left foot with surgical shoe. -Expected surgical cure. Infection at level of middle phalanx/PIPJ and level of resection was proximally at the MPJ.  -Pathology shows viable margin at level of resection. Surgical cure  -Culture grew strep mitis/oralis, rare diptheroids  -Recommend discharge on po abx for 10-14 days on Augmentin, erythromycin  -Patient stable for discharge from a podiatric standpoint. Discharge Instructions:  -Follow-up with Dr. Lamar Collier in office 10-14 days. -WBAT in surgical shoes.  -Keep dressing clean, dry, and intact.

## 2023-01-19 NOTE — PROGRESS NOTES
A Spiritual Care Partner Volunteer visited patient in Room 517 on 1/19/2023.   Documented by:  Chaplain Bello MDiv, MS, [facility]

## 2023-01-19 NOTE — PROGRESS NOTES
Pharmacist Note - Vancomycin Dosing  Therapy day 5  Indication: Osteomyelitis, Lt 4th toe   - s/p Lt 4th toe amputation (1/16)  Current regimen: 1500 mg IV every 12 hours    Recent Labs     01/19/23  0054 01/18/23  0253 01/17/23  0332   WBC  --  9.2 9.8   CREA 0.92 0.99 0.91   BUN 16 17 14     A random vancomycin level of 18.3 mcg/mL was obtained and from this level, the patient's AUC24 is calculated to be 455 with the current regimen. Goal target range AUC/MARY 400-600      Plan: Likely achieved surgical cure; awaiting final pathology. Leuks WNL and afebrile. SCr  at baseline. Continue current regimen. Pharmacy will continue to monitor this patient daily for changes in clinical status and renal function. Elpidio Junior, PharmD  Clinical Pharmacist, Orthopedics and [address] [place] [address] ()      *Random vancomycin levels are used to calculate AUC/MARY, this level should not be interpreted as a trough. Vancomycin has been dosed using Bayesian kinetics software to target an AUC24:MARY of 400-600, which provides adequate exposure for as assumed infection due to MRSA with an MARY of 1 or less while reducing the risk of nephrotoxicity as seen with traditional trough based dosing goals.

## 2023-01-20 LAB
BACTERIA SPEC CULT: NORMAL
SERVICE CMNT-IMP: NORMAL

## 2023-05-25 RX ORDER — AMLODIPINE BESYLATE 10 MG/1
10 TABLET ORAL DAILY
COMMUNITY
Start: 2023-01-20

## 2023-05-25 RX ORDER — INSULIN GLARGINE 100 [IU]/ML
15 INJECTION, SOLUTION SUBCUTANEOUS DAILY
COMMUNITY
Start: 2023-01-19

## 2023-06-30 ENCOUNTER — HOSPITAL ENCOUNTER (EMERGENCY)
Facility: HOSPITAL | Age: 53
Discharge: HOME | End: 2023-07-01
Attending: STUDENT IN AN ORGANIZED HEALTH CARE EDUCATION/TRAINING PROGRAM
Payer: COMMERCIAL

## 2023-06-30 DIAGNOSIS — K85.90 ACUTE PANCREATITIS, UNSPECIFIED COMPLICATION STATUS, UNSPECIFIED PANCREATITIS TYPE: Primary | ICD-10-CM

## 2023-06-30 LAB
ALBUMIN SERPL-MCNC: 3.3 G/DL (ref 3.5–5)
ALBUMIN/GLOB SERPL: 0.7 (ref 1.1–2.2)
ALP SERPL-CCNC: 116 U/L (ref 45–117)
ALT SERPL-CCNC: 21 U/L (ref 12–78)
ANION GAP SERPL CALC-SCNC: 5 MMOL/L (ref 5–15)
APPEARANCE UR: ABNORMAL
AST SERPL-CCNC: 14 U/L (ref 15–37)
BACTERIA URNS QL MICRO: NEGATIVE /HPF
BASOPHILS # BLD: 0.1 K/UL (ref 0–0.1)
BASOPHILS NFR BLD: 1 % (ref 0–1)
BILIRUB SERPL-MCNC: 0.6 MG/DL (ref 0.2–1)
BILIRUB UR QL: NEGATIVE
BUN SERPL-MCNC: 11 MG/DL (ref 6–20)
BUN/CREAT SERPL: 8 (ref 12–20)
CALCIUM SERPL-MCNC: 9.4 MG/DL (ref 8.5–10.1)
CHLORIDE SERPL-SCNC: 97 MMOL/L (ref 97–108)
CO2 SERPL-SCNC: 32 MMOL/L (ref 21–32)
COLOR UR: ABNORMAL
CREAT SERPL-MCNC: 1.45 MG/DL (ref 0.7–1.3)
DIFFERENTIAL METHOD BLD: ABNORMAL
EOSINOPHIL # BLD: 0.4 K/UL (ref 0–0.4)
EOSINOPHIL NFR BLD: 4 % (ref 0–7)
EPITH CASTS URNS QL MICRO: NORMAL /LPF
ERYTHROCYTE [DISTWIDTH] IN BLOOD BY AUTOMATED COUNT: 12.2 % (ref 11.5–14.5)
GLOBULIN SER CALC-MCNC: 4.5 G/DL (ref 2–4)
GLUCOSE BLD STRIP.AUTO-MCNC: 465 MG/DL (ref 65–117)
GLUCOSE SERPL-MCNC: 517 MG/DL (ref 65–100)
GLUCOSE UR STRIP.AUTO-MCNC: >1000 MG/DL
HCT VFR BLD AUTO: 42.6 % (ref 36.6–50.3)
HGB BLD-MCNC: 13.9 G/DL (ref 12.1–17)
HGB UR QL STRIP: NEGATIVE
IMM GRANULOCYTES # BLD AUTO: 0.1 K/UL (ref 0–0.04)
IMM GRANULOCYTES NFR BLD AUTO: 1 % (ref 0–0.5)
KETONES UR QL STRIP.AUTO: NEGATIVE MG/DL
LEUKOCYTE ESTERASE UR QL STRIP.AUTO: NEGATIVE
LIPASE SERPL-CCNC: 478 U/L (ref 73–393)
LYMPHOCYTES # BLD: 2 K/UL (ref 0.8–3.5)
LYMPHOCYTES NFR BLD: 19 % (ref 12–49)
MCH RBC QN AUTO: 28.9 PG (ref 26–34)
MCHC RBC AUTO-ENTMCNC: 32.6 G/DL (ref 30–36.5)
MCV RBC AUTO: 88.6 FL (ref 80–99)
MONOCYTES # BLD: 0.7 K/UL (ref 0–1)
MONOCYTES NFR BLD: 6 % (ref 5–13)
NEUTS SEG # BLD: 7.5 K/UL (ref 1.8–8)
NEUTS SEG NFR BLD: 69 % (ref 32–75)
NITRITE UR QL STRIP.AUTO: NEGATIVE
NRBC # BLD: 0 K/UL (ref 0–0.01)
NRBC BLD-RTO: 0 PER 100 WBC
PH UR STRIP: 5.5 (ref 5–8)
PLATELET # BLD AUTO: 266 K/UL (ref 150–400)
PMV BLD AUTO: 12.9 FL (ref 8.9–12.9)
POTASSIUM SERPL-SCNC: 4 MMOL/L (ref 3.5–5.1)
PROT SERPL-MCNC: 7.8 G/DL (ref 6.4–8.2)
PROT UR STRIP-MCNC: ABNORMAL MG/DL
RBC # BLD AUTO: 4.81 M/UL (ref 4.1–5.7)
RBC #/AREA URNS HPF: NORMAL /HPF (ref 0–5)
SERVICE CMNT-IMP: ABNORMAL
SODIUM SERPL-SCNC: 134 MMOL/L (ref 136–145)
SP GR UR REFRACTOMETRY: 1.01 (ref 1–1.03)
UROBILINOGEN UR QL STRIP.AUTO: 0.2 EU/DL (ref 0.2–1)
WBC # BLD AUTO: 10.8 K/UL (ref 4.1–11.1)
WBC URNS QL MICRO: NORMAL /HPF (ref 0–4)

## 2023-06-30 PROCEDURE — 6370000000 HC RX 637 (ALT 250 FOR IP): Performed by: STUDENT IN AN ORGANIZED HEALTH CARE EDUCATION/TRAINING PROGRAM

## 2023-06-30 PROCEDURE — 83690 ASSAY OF LIPASE: CPT

## 2023-06-30 PROCEDURE — 82962 GLUCOSE BLOOD TEST: CPT

## 2023-06-30 PROCEDURE — 80053 COMPREHEN METABOLIC PANEL: CPT

## 2023-06-30 PROCEDURE — 85025 COMPLETE CBC W/AUTO DIFF WBC: CPT

## 2023-06-30 PROCEDURE — 96361 HYDRATE IV INFUSION ADD-ON: CPT

## 2023-06-30 PROCEDURE — 81001 URINALYSIS AUTO W/SCOPE: CPT

## 2023-06-30 PROCEDURE — 99285 EMERGENCY DEPT VISIT HI MDM: CPT

## 2023-06-30 PROCEDURE — 2580000003 HC RX 258: Performed by: STUDENT IN AN ORGANIZED HEALTH CARE EDUCATION/TRAINING PROGRAM

## 2023-06-30 PROCEDURE — 36415 COLL VENOUS BLD VENIPUNCTURE: CPT

## 2023-06-30 PROCEDURE — 6360000002 HC RX W HCPCS: Performed by: STUDENT IN AN ORGANIZED HEALTH CARE EDUCATION/TRAINING PROGRAM

## 2023-06-30 PROCEDURE — 96374 THER/PROPH/DIAG INJ IV PUSH: CPT

## 2023-06-30 RX ORDER — KETOROLAC TROMETHAMINE 30 MG/ML
15 INJECTION, SOLUTION INTRAMUSCULAR; INTRAVENOUS
Status: COMPLETED | OUTPATIENT
Start: 2023-06-30 | End: 2023-06-30

## 2023-06-30 RX ORDER — ACETAMINOPHEN 500 MG
1000 TABLET ORAL ONCE
Status: COMPLETED | OUTPATIENT
Start: 2023-06-30 | End: 2023-06-30

## 2023-06-30 RX ORDER — SODIUM CHLORIDE, SODIUM LACTATE, POTASSIUM CHLORIDE, AND CALCIUM CHLORIDE .6; .31; .03; .02 G/100ML; G/100ML; G/100ML; G/100ML
1000 INJECTION, SOLUTION INTRAVENOUS ONCE
Status: COMPLETED | OUTPATIENT
Start: 2023-06-30 | End: 2023-07-01

## 2023-06-30 RX ADMIN — KETOROLAC TROMETHAMINE 15 MG: 30 INJECTION, SOLUTION INTRAMUSCULAR; INTRAVENOUS at 23:33

## 2023-06-30 RX ADMIN — ACETAMINOPHEN 1000 MG: 500 TABLET ORAL at 23:33

## 2023-06-30 RX ADMIN — SODIUM CHLORIDE, POTASSIUM CHLORIDE, SODIUM LACTATE AND CALCIUM CHLORIDE 1000 ML: 600; 310; 30; 20 INJECTION, SOLUTION INTRAVENOUS at 23:33

## 2023-06-30 ASSESSMENT — LIFESTYLE VARIABLES
HOW OFTEN DO YOU HAVE A DRINK CONTAINING ALCOHOL: NEVER
HOW MANY STANDARD DRINKS CONTAINING ALCOHOL DO YOU HAVE ON A TYPICAL DAY: PATIENT DOES NOT DRINK

## 2023-06-30 ASSESSMENT — PAIN DESCRIPTION - ORIENTATION: ORIENTATION: MID;UPPER

## 2023-06-30 ASSESSMENT — PAIN DESCRIPTION - PAIN TYPE: TYPE: ACUTE PAIN

## 2023-06-30 ASSESSMENT — PAIN - FUNCTIONAL ASSESSMENT: PAIN_FUNCTIONAL_ASSESSMENT: 0-10

## 2023-06-30 ASSESSMENT — PAIN DESCRIPTION - LOCATION: LOCATION: ABDOMEN

## 2023-06-30 ASSESSMENT — PAIN SCALES - GENERAL: PAINLEVEL_OUTOF10: 10

## 2023-06-30 ASSESSMENT — PAIN DESCRIPTION - DESCRIPTORS: DESCRIPTORS: ACHING

## 2023-07-01 ENCOUNTER — APPOINTMENT (OUTPATIENT)
Facility: HOSPITAL | Age: 53
End: 2023-07-01
Payer: COMMERCIAL

## 2023-07-01 VITALS
BODY MASS INDEX: 32.11 KG/M2 | OXYGEN SATURATION: 95 % | RESPIRATION RATE: 18 BRPM | HEIGHT: 74 IN | SYSTOLIC BLOOD PRESSURE: 151 MMHG | TEMPERATURE: 98.6 F | HEART RATE: 97 BPM | WEIGHT: 250.22 LBS | DIASTOLIC BLOOD PRESSURE: 93 MMHG

## 2023-07-01 PROCEDURE — 74177 CT ABD & PELVIS W/CONTRAST: CPT

## 2023-07-01 PROCEDURE — 6360000004 HC RX CONTRAST MEDICATION: Performed by: STUDENT IN AN ORGANIZED HEALTH CARE EDUCATION/TRAINING PROGRAM

## 2023-07-01 PROCEDURE — 6370000000 HC RX 637 (ALT 250 FOR IP): Performed by: STUDENT IN AN ORGANIZED HEALTH CARE EDUCATION/TRAINING PROGRAM

## 2023-07-01 RX ORDER — ONDANSETRON 4 MG/1
4 TABLET, ORALLY DISINTEGRATING ORAL 3 TIMES DAILY PRN
Qty: 21 TABLET | Refills: 0 | Status: SHIPPED | OUTPATIENT
Start: 2023-07-01

## 2023-07-01 RX ORDER — ALUMINA, MAGNESIA, AND SIMETHICONE 2400; 2400; 240 MG/30ML; MG/30ML; MG/30ML
30 SUSPENSION ORAL EVERY 6 HOURS PRN
Qty: 355 ML | Refills: 0 | Status: SHIPPED | OUTPATIENT
Start: 2023-07-01

## 2023-07-01 RX ORDER — ACETAMINOPHEN 500 MG
1000 TABLET ORAL EVERY 6 HOURS PRN
Qty: 30 TABLET | Refills: 0 | Status: SHIPPED | OUTPATIENT
Start: 2023-07-01 | End: 2023-07-11

## 2023-07-01 RX ORDER — DICYCLOMINE HCL 20 MG
20 TABLET ORAL EVERY 6 HOURS PRN
Qty: 20 TABLET | Refills: 0 | Status: SHIPPED | OUTPATIENT
Start: 2023-07-01 | End: 2023-07-11

## 2023-07-01 RX ORDER — MORPHINE SULFATE 15 MG/1
7.5 TABLET ORAL EVERY 6 HOURS PRN
Qty: 6 TABLET | Refills: 0 | Status: SHIPPED | OUTPATIENT
Start: 2023-07-01 | End: 2023-07-04

## 2023-07-01 RX ORDER — FAMOTIDINE 40 MG/1
40 TABLET, FILM COATED ORAL NIGHTLY
Qty: 30 TABLET | Refills: 0 | Status: SHIPPED | OUTPATIENT
Start: 2023-07-01

## 2023-07-01 RX ADMIN — ALUMINUM HYDROXIDE AND MAGNESIUM HYDROXIDE 30 ML: 200; 200 SUSPENSION ORAL at 00:25

## 2023-07-01 RX ADMIN — IOPAMIDOL 100 ML: 755 INJECTION, SOLUTION INTRAVENOUS at 00:20

## 2023-07-01 ASSESSMENT — PAIN SCALES - GENERAL
PAINLEVEL_OUTOF10: 7
PAINLEVEL_OUTOF10: 3

## 2023-07-01 ASSESSMENT — PAIN DESCRIPTION - LOCATION
LOCATION: ABDOMEN
LOCATION: ABDOMEN

## 2023-12-16 ENCOUNTER — HOSPITAL ENCOUNTER (EMERGENCY)
Facility: HOSPITAL | Age: 53
Discharge: HOME | End: 2023-12-16
Attending: STUDENT IN AN ORGANIZED HEALTH CARE EDUCATION/TRAINING PROGRAM
Payer: COMMERCIAL

## 2023-12-16 ENCOUNTER — APPOINTMENT (OUTPATIENT)
Facility: HOSPITAL | Age: 53
End: 2023-12-16
Payer: COMMERCIAL

## 2023-12-16 VITALS
SYSTOLIC BLOOD PRESSURE: 144 MMHG | HEART RATE: 84 BPM | BODY MASS INDEX: 32.89 KG/M2 | TEMPERATURE: 98.4 F | WEIGHT: 256.17 LBS | DIASTOLIC BLOOD PRESSURE: 95 MMHG | OXYGEN SATURATION: 96 % | RESPIRATION RATE: 14 BRPM

## 2023-12-16 DIAGNOSIS — R20.2 PARESTHESIAS: ICD-10-CM

## 2023-12-16 DIAGNOSIS — E83.42 HYPOMAGNESEMIA: ICD-10-CM

## 2023-12-16 DIAGNOSIS — E11.65 HYPERGLYCEMIA DUE TO DIABETES MELLITUS (HCC): ICD-10-CM

## 2023-12-16 DIAGNOSIS — M79.602 LEFT ARM PAIN: Primary | ICD-10-CM

## 2023-12-16 LAB
ALBUMIN SERPL-MCNC: 3 G/DL (ref 3.5–5)
ALBUMIN/GLOB SERPL: 0.8 (ref 1.1–2.2)
ALP SERPL-CCNC: 87 U/L (ref 45–117)
ALT SERPL-CCNC: 13 U/L (ref 12–78)
ANION GAP SERPL CALC-SCNC: 9 MMOL/L (ref 5–15)
AST SERPL-CCNC: 12 U/L (ref 15–37)
BASOPHILS # BLD: 0 K/UL (ref 0–0.1)
BASOPHILS NFR BLD: 1 % (ref 0–1)
BILIRUB SERPL-MCNC: 0.7 MG/DL (ref 0.2–1)
BUN SERPL-MCNC: 12 MG/DL (ref 6–20)
BUN/CREAT SERPL: 12 (ref 12–20)
CALCIUM SERPL-MCNC: 8.8 MG/DL (ref 8.5–10.1)
CHLORIDE SERPL-SCNC: 102 MMOL/L (ref 97–108)
CO2 SERPL-SCNC: 27 MMOL/L (ref 21–32)
CREAT SERPL-MCNC: 1.04 MG/DL (ref 0.7–1.3)
DIFFERENTIAL METHOD BLD: NORMAL
EOSINOPHIL # BLD: 0.3 K/UL (ref 0–0.4)
EOSINOPHIL NFR BLD: 5 % (ref 0–7)
ERYTHROCYTE [DISTWIDTH] IN BLOOD BY AUTOMATED COUNT: 14.5 % (ref 11.5–14.5)
GLOBULIN SER CALC-MCNC: 3.7 G/DL (ref 2–4)
GLUCOSE BLD STRIP.AUTO-MCNC: 325 MG/DL (ref 65–117)
GLUCOSE SERPL-MCNC: 340 MG/DL (ref 65–100)
HCT VFR BLD AUTO: 44 % (ref 36.6–50.3)
HGB BLD-MCNC: 14.1 G/DL (ref 12.1–17)
IMM GRANULOCYTES # BLD AUTO: 0 K/UL (ref 0–0.04)
IMM GRANULOCYTES NFR BLD AUTO: 0 % (ref 0–0.5)
LYMPHOCYTES # BLD: 1.7 K/UL (ref 0.8–3.5)
LYMPHOCYTES NFR BLD: 27 % (ref 12–49)
MAGNESIUM SERPL-MCNC: 1.3 MG/DL (ref 1.6–2.4)
MCH RBC QN AUTO: 27.5 PG (ref 26–34)
MCHC RBC AUTO-ENTMCNC: 32 G/DL (ref 30–36.5)
MCV RBC AUTO: 85.9 FL (ref 80–99)
MONOCYTES # BLD: 0.4 K/UL (ref 0–1)
MONOCYTES NFR BLD: 7 % (ref 5–13)
NEUTS SEG # BLD: 4 K/UL (ref 1.8–8)
NEUTS SEG NFR BLD: 60 % (ref 32–75)
NRBC # BLD: 0 K/UL (ref 0–0.01)
NRBC BLD-RTO: 0 PER 100 WBC
PLATELET # BLD AUTO: 218 K/UL (ref 150–400)
PMV BLD AUTO: 10.9 FL (ref 8.9–12.9)
POTASSIUM SERPL-SCNC: 4 MMOL/L (ref 3.5–5.1)
PROT SERPL-MCNC: 6.7 G/DL (ref 6.4–8.2)
RBC # BLD AUTO: 5.12 M/UL (ref 4.1–5.7)
SERVICE CMNT-IMP: ABNORMAL
SODIUM SERPL-SCNC: 138 MMOL/L (ref 136–145)
WBC # BLD AUTO: 6.5 K/UL (ref 4.1–11.1)

## 2023-12-16 PROCEDURE — 6360000002 HC RX W HCPCS: Performed by: STUDENT IN AN ORGANIZED HEALTH CARE EDUCATION/TRAINING PROGRAM

## 2023-12-16 PROCEDURE — 6370000000 HC RX 637 (ALT 250 FOR IP): Performed by: STUDENT IN AN ORGANIZED HEALTH CARE EDUCATION/TRAINING PROGRAM

## 2023-12-16 PROCEDURE — 82962 GLUCOSE BLOOD TEST: CPT

## 2023-12-16 PROCEDURE — 85025 COMPLETE CBC W/AUTO DIFF WBC: CPT

## 2023-12-16 PROCEDURE — 70450 CT HEAD/BRAIN W/O DYE: CPT

## 2023-12-16 PROCEDURE — 83735 ASSAY OF MAGNESIUM: CPT

## 2023-12-16 PROCEDURE — 80053 COMPREHEN METABOLIC PANEL: CPT

## 2023-12-16 PROCEDURE — 36415 COLL VENOUS BLD VENIPUNCTURE: CPT

## 2023-12-16 RX ORDER — ACETAMINOPHEN 500 MG
1000 TABLET ORAL
Status: COMPLETED | OUTPATIENT
Start: 2023-12-16 | End: 2023-12-16

## 2023-12-16 RX ORDER — MAGNESIUM SULFATE IN WATER 40 MG/ML
2000 INJECTION, SOLUTION INTRAVENOUS ONCE
Status: COMPLETED | OUTPATIENT
Start: 2023-12-16 | End: 2023-12-16

## 2023-12-16 RX ADMIN — ACETAMINOPHEN 1000 MG: 500 TABLET ORAL at 11:28

## 2023-12-16 RX ADMIN — MAGNESIUM SULFATE HEPTAHYDRATE 2000 MG: 40 INJECTION, SOLUTION INTRAVENOUS at 12:18

## 2023-12-16 ASSESSMENT — PAIN DESCRIPTION - LOCATION
LOCATION: ARM
LOCATION: ARM

## 2023-12-16 ASSESSMENT — PAIN DESCRIPTION - DESCRIPTORS
DESCRIPTORS: ACHING
DESCRIPTORS: ACHING

## 2023-12-16 ASSESSMENT — PAIN SCALES - GENERAL
PAINLEVEL_OUTOF10: 10
PAINLEVEL_OUTOF10: 10

## 2023-12-16 ASSESSMENT — PAIN - FUNCTIONAL ASSESSMENT: PAIN_FUNCTIONAL_ASSESSMENT: 0-10

## 2023-12-16 ASSESSMENT — PAIN DESCRIPTION - ORIENTATION
ORIENTATION: LEFT
ORIENTATION: LEFT

## 2023-12-16 NOTE — DISCHARGE INSTRUCTIONS
Please to your blood glucose closely and take your insulin as prescribed. Follow-up with your primary care doctor for reevaluation of your blood glucose to make sure it has been well-controlled and to follow-up regarding your other symptoms. Return to the emergency department if you develop any new numbness, weakness, any other concerns or problems.

## 2023-12-16 NOTE — ED PROVIDER NOTES
[facility] EMERGENCY CTR  EMERGENCY DEPARTMENT ENCOUNTER      Pt Name: Abel Meraz  MRN: [id number]  [address] [facility] 1970  Date of evaluation: 12/16/2023  Provider: Brandon Abad DO    CHIEF COMPLAINT       Chief Complaint   Patient presents with    Numbness     Right leg    Arm Pain     left         HISTORY OF PRESENT ILLNESS    HPI    Abel Meraz is a 46 y.o. male with history of HTN, DM, HLD, previous CVA and MI who presents to the emergency department for left arm pain/weakness and right leg tingling/numbness. Patient reports having a stroke and heart attack approximately 3 months ago, states at the time of his stroke he was having speech difficulties and left arm tingling/numbness but no weakness. States at that time he was also diagnosed with a heart attack and is having right leg tingling and numbness. Notes over the last 2 to 3 weeks he has since developed left arm weakness which is new. Endorses associated pain in the left arm as well. Also endorsing worsening of his tingling/numbness in his right leg. Denies any other recent illness. Denies known history of diabetic neuropathy but has had previous toe amputation. Nursing Notes were reviewed. REVIEW OF SYSTEMS       Review of Systems   Constitutional:  Negative for chills and fever. HENT:  Negative for congestion and rhinorrhea. Eyes:  Negative for discharge and redness. Respiratory:  Negative for cough and shortness of breath. Cardiovascular:  Negative for chest pain. Gastrointestinal:  Negative for abdominal pain, diarrhea, nausea and vomiting. Neurological:  Positive for weakness and numbness. Negative for speech difficulty. Psychiatric/Behavioral:  Negative for agitation.             PAST MEDICAL HISTORY     Past Medical History:   Diagnosis Date    Diabetes ([address])     High cholesterol          SURGICAL HISTORY       Past Surgical History:   Procedure Laterality Date    TOE AMPUTATION           CURRENT Decision-making details documented in ED Course. Radiology: ordered. Risk  OTC drugs. CONSULTS:  None    REASSESSMENT       On reevaluation after Tylenol, IV fluids and magnesium replacement he is feeling somewhat improved. All available radiology and laboratory results have been reviewed with patient and/or available family. Patient has not taken his insulin today, he was encouraged on taking his medications as prescribed for good glucose control and prevention of complications from uncontrolled diabetes. Symptoms could be secondary to peripheral neuropathy. He was encouraged on close follow-up with PCP. ER return precautions given. Patient and/or family verbally conveyed their understanding and agreement of the patient's signs, symptoms, diagnosis, treatment and prognosis and additionally agree to follow-up as recommended in the discharge instructions or to return to the Emergency Department should their condition change or worsen prior to their follow-up appointment. All questions have been answered and patient and/or available family express understanding. PROCEDURES:  Unless otherwise noted below, none     Procedures      FINAL IMPRESSION      1. Left arm pain    2. Paresthesias    3. Hyperglycemia due to diabetes mellitus ([address] [facility])    4.  Hypomagnesemia          DISPOSITION/PLAN   DISPOSITION Discharge - Pending Orders Complete 12/16/2023 12:51:38 PM      PATIENT REFERRED TO:  [facility] EMERGENCY CTR  [address] [facility] [place] [address] [id number] [phone]    If symptoms worsen    [facility] CANDELARIA RODRIGUEZ [facility]  [address] [facility] [address] [facility] [address] [phone]  Schedule an appointment as soon as possible for a visit       [facility] EMERGENCY CTR  [address] [facility] [place] [address] [phone]    If symptoms worsen      DISCHARGE MEDICATIONS:  New Prescriptions    No medications on file         (Please note that

## 2023-12-16 NOTE — ED TRIAGE NOTES
46year old male pt comes to the ED via POV for a CC Right leg numbness and left arm pain. Pt states that this has been going on for months after his heart attack and stroke. Patient states that his left arm has been bothering him a little more though last 3 weeks. Pt rates his pain a 10 and is A&Ox4.

## 2024-02-08 ENCOUNTER — APPOINTMENT (OUTPATIENT)
Facility: HOSPITAL | Age: 54
DRG: 030 | End: 2024-02-08
Payer: COMMERCIAL

## 2024-02-08 ENCOUNTER — HOSPITAL ENCOUNTER (EMERGENCY)
Facility: HOSPITAL | Age: 54
Discharge: HOME | DRG: 030 | End: 2024-02-11
Payer: COMMERCIAL

## 2024-02-08 ENCOUNTER — HOSPITAL ENCOUNTER (INPATIENT)
Facility: HOSPITAL | Age: 54
LOS: 19 days | Discharge: NURSING, REHAB OR LONG-TERM CARE | DRG: 030 | End: 2024-02-27
Attending: EMERGENCY MEDICINE | Admitting: STUDENT IN AN ORGANIZED HEALTH CARE EDUCATION/TRAINING PROGRAM
Payer: COMMERCIAL

## 2024-02-08 VITALS
HEART RATE: 92 BPM | DIASTOLIC BLOOD PRESSURE: 87 MMHG | RESPIRATION RATE: 16 BRPM | SYSTOLIC BLOOD PRESSURE: 131 MMHG | TEMPERATURE: 97.5 F | OXYGEN SATURATION: 96 %

## 2024-02-08 DIAGNOSIS — R73.9 HYPERGLYCEMIA: ICD-10-CM

## 2024-02-08 DIAGNOSIS — I63.512 CEREBROVASCULAR ACCIDENT (CVA) DUE TO OCCLUSION OF LEFT MIDDLE CEREBRAL ARTERY (HCC): Primary | ICD-10-CM

## 2024-02-08 PROBLEM — I63.9 ACUTE CEREBROVASCULAR ACCIDENT (CVA) DUE TO ISCHEMIA (HCC): Status: ACTIVE | Noted: 2024-02-08

## 2024-02-08 PROBLEM — I63.412 ACUTE STROKE DUE TO EMBOLISM OF LEFT MIDDLE CEREBRAL ARTERY (HCC): Status: ACTIVE | Noted: 2024-02-08

## 2024-02-08 LAB
ALBUMIN SERPL-MCNC: 3.5 G/DL (ref 3.5–5)
ALBUMIN/GLOB SERPL: 0.9 (ref 1.1–2.2)
ALP SERPL-CCNC: 179 U/L (ref 45–117)
ALT SERPL-CCNC: 37 U/L (ref 12–78)
AMPHET UR QL SCN: NEGATIVE
ANION GAP BLD CALC-SCNC: 7 (ref 10–20)
ANION GAP SERPL CALC-SCNC: 4 MMOL/L (ref 5–15)
ANION GAP SERPL CALC-SCNC: 8 MMOL/L (ref 5–15)
ANION GAP SERPL CALC-SCNC: 8 MMOL/L (ref 5–15)
APPEARANCE UR: CLEAR
AST SERPL-CCNC: 27 U/L (ref 15–37)
BACTERIA URNS QL MICRO: NEGATIVE /HPF
BARBITURATES UR QL SCN: NEGATIVE
BASE EXCESS BLD CALC-SCNC: 0.2 MMOL/L
BASOPHILS # BLD: 0 K/UL (ref 0–0.1)
BASOPHILS NFR BLD: 0 % (ref 0–1)
BENZODIAZ UR QL: NEGATIVE
BILIRUB SERPL-MCNC: 0.8 MG/DL (ref 0.2–1)
BILIRUB UR QL: NEGATIVE
BUN SERPL-MCNC: 35 MG/DL (ref 6–20)
BUN SERPL-MCNC: 35 MG/DL (ref 6–20)
BUN SERPL-MCNC: 40 MG/DL (ref 6–20)
BUN/CREAT SERPL: 19 (ref 12–20)
BUN/CREAT SERPL: 21 (ref 12–20)
BUN/CREAT SERPL: 24 (ref 12–20)
CA-I BLD-MCNC: 1.15 MMOL/L (ref 1.12–1.32)
CALCIUM SERPL-MCNC: 8.7 MG/DL (ref 8.5–10.1)
CALCIUM SERPL-MCNC: 9.3 MG/DL (ref 8.5–10.1)
CALCIUM SERPL-MCNC: 9.9 MG/DL (ref 8.5–10.1)
CANNABINOIDS UR QL SCN: NEGATIVE
CHLORIDE BLD-SCNC: 90 MMOL/L (ref 100–108)
CHLORIDE SERPL-SCNC: 100 MMOL/L (ref 97–108)
CHLORIDE SERPL-SCNC: 87 MMOL/L (ref 97–108)
CHLORIDE SERPL-SCNC: 93 MMOL/L (ref 97–108)
CO2 BLD-SCNC: 26 MMOL/L (ref 19–24)
CO2 SERPL-SCNC: 24 MMOL/L (ref 21–32)
CO2 SERPL-SCNC: 25 MMOL/L (ref 21–32)
CO2 SERPL-SCNC: 27 MMOL/L (ref 21–32)
COCAINE UR QL SCN: POSITIVE
COLOR UR: ABNORMAL
COMMENT:: NORMAL
CREAT SERPL-MCNC: 1.48 MG/DL (ref 0.7–1.3)
CREAT SERPL-MCNC: 1.83 MG/DL (ref 0.7–1.3)
CREAT SERPL-MCNC: 1.87 MG/DL (ref 0.7–1.3)
CREAT UR-MCNC: 1.2 MG/DL (ref 0.6–1.3)
DIFFERENTIAL METHOD BLD: NORMAL
EKG ATRIAL RATE: 89 BPM
EKG DIAGNOSIS: NORMAL
EKG P AXIS: 72 DEGREES
EKG P-R INTERVAL: 172 MS
EKG Q-T INTERVAL: 380 MS
EKG QRS DURATION: 100 MS
EKG QTC CALCULATION (BAZETT): 462 MS
EKG R AXIS: 45 DEGREES
EKG T AXIS: 88 DEGREES
EKG VENTRICULAR RATE: 89 BPM
EOSINOPHIL # BLD: 0.1 K/UL (ref 0–0.4)
EOSINOPHIL NFR BLD: 1 % (ref 0–7)
EPITH CASTS URNS QL MICRO: ABNORMAL /LPF
ERYTHROCYTE [DISTWIDTH] IN BLOOD BY AUTOMATED COUNT: 13.3 % (ref 11.5–14.5)
EST. AVERAGE GLUCOSE BLD GHB EST-MCNC: 344 MG/DL
GLOBULIN SER CALC-MCNC: 4.1 G/DL (ref 2–4)
GLUCOSE BLD STRIP.AUTO-MCNC: 206 MG/DL (ref 65–117)
GLUCOSE BLD STRIP.AUTO-MCNC: 238 MG/DL (ref 65–117)
GLUCOSE BLD STRIP.AUTO-MCNC: 241 MG/DL (ref 65–117)
GLUCOSE BLD STRIP.AUTO-MCNC: 249 MG/DL (ref 65–117)
GLUCOSE BLD STRIP.AUTO-MCNC: 254 MG/DL (ref 65–117)
GLUCOSE BLD STRIP.AUTO-MCNC: 388 MG/DL (ref 65–117)
GLUCOSE BLD STRIP.AUTO-MCNC: 508 MG/DL (ref 65–117)
GLUCOSE BLD STRIP.AUTO-MCNC: >600 MG/DL (ref 65–117)
GLUCOSE BLD STRIP.AUTO-MCNC: >700 MG/DL (ref 74–106)
GLUCOSE SERPL-MCNC: 1001 MG/DL (ref 65–100)
GLUCOSE SERPL-MCNC: 260 MG/DL (ref 65–100)
GLUCOSE SERPL-MCNC: 786 MG/DL (ref 65–100)
GLUCOSE UR STRIP.AUTO-MCNC: >1000 MG/DL
HBA1C MFR BLD: 13.6 % (ref 4–5.6)
HCO3 BLDA-SCNC: 26 MMOL/L
HCT VFR BLD AUTO: 45.7 % (ref 36.6–50.3)
HGB BLD-MCNC: 15.4 G/DL (ref 12.1–17)
HGB UR QL STRIP: NEGATIVE
IMM GRANULOCYTES # BLD AUTO: 0 K/UL (ref 0–0.04)
IMM GRANULOCYTES NFR BLD AUTO: 0 % (ref 0–0.5)
INR PPP: 1 (ref 0.9–1.1)
KETONES UR QL STRIP.AUTO: NEGATIVE MG/DL
LACTATE BLD-SCNC: 2.61 MMOL/L (ref 0.4–2)
LEUKOCYTE ESTERASE UR QL STRIP.AUTO: NEGATIVE
LYMPHOCYTES # BLD: 2.1 K/UL (ref 0.8–3.5)
LYMPHOCYTES NFR BLD: 23 % (ref 12–49)
Lab: ABNORMAL
MCH RBC QN AUTO: 29.1 PG (ref 26–34)
MCHC RBC AUTO-ENTMCNC: 33.7 G/DL (ref 30–36.5)
MCV RBC AUTO: 86.4 FL (ref 80–99)
METHADONE UR QL: NEGATIVE
MONOCYTES # BLD: 0.5 K/UL (ref 0–1)
MONOCYTES NFR BLD: 5 % (ref 5–13)
NEUTS SEG # BLD: 6.7 K/UL (ref 1.8–8)
NEUTS SEG NFR BLD: 71 % (ref 32–75)
NITRITE UR QL STRIP.AUTO: NEGATIVE
NRBC # BLD: 0 K/UL (ref 0–0.01)
NRBC BLD-RTO: 0 PER 100 WBC
OPIATES UR QL: NEGATIVE
PCO2 BLDV: 46 MMHG (ref 41–51)
PCP UR QL: NEGATIVE
PH BLDV: 7.37 (ref 7.32–7.42)
PH UR STRIP: 5 (ref 5–8)
PLATELET # BLD AUTO: 196 K/UL (ref 150–400)
PO2 BLDV: 39 MMHG (ref 25–40)
POTASSIUM BLD-SCNC: 5.9 MMOL/L (ref 3.5–5.5)
POTASSIUM SERPL-SCNC: 4.6 MMOL/L (ref 3.5–5.1)
POTASSIUM SERPL-SCNC: 5.5 MMOL/L (ref 3.5–5.1)
POTASSIUM SERPL-SCNC: 5.6 MMOL/L (ref 3.5–5.1)
PROT SERPL-MCNC: 7.6 G/DL (ref 6.4–8.2)
PROT UR STRIP-MCNC: NEGATIVE MG/DL
PROTHROMBIN TIME: 10.3 SEC (ref 9–11.1)
RBC # BLD AUTO: 5.29 M/UL (ref 4.1–5.7)
RBC #/AREA URNS HPF: ABNORMAL /HPF (ref 0–5)
SAO2 % BLD: 71 %
SERVICE CMNT-IMP: ABNORMAL
SODIUM BLD-SCNC: 123 MMOL/L (ref 136–145)
SODIUM SERPL-SCNC: 120 MMOL/L (ref 136–145)
SODIUM SERPL-SCNC: 121 MMOL/L (ref 136–145)
SODIUM SERPL-SCNC: 135 MMOL/L (ref 136–145)
SP GR UR REFRACTOMETRY: 1.03
SPECIMEN HOLD: NORMAL
SPECIMEN HOLD: NORMAL
SPECIMEN SITE: ABNORMAL
TROPONIN I SERPL HS-MCNC: 71 NG/L (ref 0–76)
UROBILINOGEN UR QL STRIP.AUTO: 0.2 EU/DL (ref 0.2–1)
WBC # BLD AUTO: 9.4 K/UL (ref 4.1–11.1)
WBC URNS QL MICRO: ABNORMAL /HPF (ref 0–4)

## 2024-02-08 PROCEDURE — 82947 ASSAY GLUCOSE BLOOD QUANT: CPT

## 2024-02-08 PROCEDURE — 96374 THER/PROPH/DIAG INJ IV PUSH: CPT

## 2024-02-08 PROCEDURE — 85610 PROTHROMBIN TIME: CPT

## 2024-02-08 PROCEDURE — 36415 COLL VENOUS BLD VENIPUNCTURE: CPT

## 2024-02-08 PROCEDURE — 80307 DRUG TEST PRSMV CHEM ANLYZR: CPT

## 2024-02-08 PROCEDURE — 83036 HEMOGLOBIN GLYCOSYLATED A1C: CPT

## 2024-02-08 PROCEDURE — 81001 URINALYSIS AUTO W/SCOPE: CPT

## 2024-02-08 PROCEDURE — 6370000000 HC RX 637 (ALT 250 FOR IP): Performed by: EMERGENCY MEDICINE

## 2024-02-08 PROCEDURE — 80048 BASIC METABOLIC PNL TOTAL CA: CPT

## 2024-02-08 PROCEDURE — 6360000002 HC RX W HCPCS

## 2024-02-08 PROCEDURE — 84484 ASSAY OF TROPONIN QUANT: CPT

## 2024-02-08 PROCEDURE — 99285 EMERGENCY DEPT VISIT HI MDM: CPT

## 2024-02-08 PROCEDURE — 2500000003 HC RX 250 WO HCPCS: Performed by: RADIOLOGY

## 2024-02-08 PROCEDURE — 84132 ASSAY OF SERUM POTASSIUM: CPT

## 2024-02-08 PROCEDURE — 61645 PERQ ART M-THROMBECT &/NFS: CPT | Performed by: RADIOLOGY

## 2024-02-08 PROCEDURE — 82962 GLUCOSE BLOOD TEST: CPT

## 2024-02-08 PROCEDURE — 6360000004 HC RX CONTRAST MEDICATION: Performed by: RADIOLOGY

## 2024-02-08 PROCEDURE — 03CG3ZZ EXTIRPATION OF MATTER FROM INTRACRANIAL ARTERY, PERCUTANEOUS APPROACH: ICD-10-PCS | Performed by: RADIOLOGY

## 2024-02-08 PROCEDURE — 2709999900 IR MECHANICAL ART THROMBECTOMY INTRACRANIAL

## 2024-02-08 PROCEDURE — 80053 COMPREHEN METABOLIC PANEL: CPT

## 2024-02-08 PROCEDURE — 70496 CT ANGIOGRAPHY HEAD: CPT

## 2024-02-08 PROCEDURE — APPNB45 APP NON BILLABLE 31-45 MINUTES

## 2024-02-08 PROCEDURE — C1769 GUIDE WIRE: HCPCS

## 2024-02-08 PROCEDURE — 82803 BLOOD GASES ANY COMBINATION: CPT

## 2024-02-08 PROCEDURE — 93005 ELECTROCARDIOGRAM TRACING: CPT | Performed by: EMERGENCY MEDICINE

## 2024-02-08 PROCEDURE — 2580000003 HC RX 258

## 2024-02-08 PROCEDURE — 2580000003 HC RX 258: Performed by: STUDENT IN AN ORGANIZED HEALTH CARE EDUCATION/TRAINING PROGRAM

## 2024-02-08 PROCEDURE — 84295 ASSAY OF SERUM SODIUM: CPT

## 2024-02-08 PROCEDURE — 2580000003 HC RX 258: Performed by: EMERGENCY MEDICINE

## 2024-02-08 PROCEDURE — 2000000000 HC ICU R&B

## 2024-02-08 PROCEDURE — 82330 ASSAY OF CALCIUM: CPT

## 2024-02-08 PROCEDURE — 99221 1ST HOSP IP/OBS SF/LOW 40: CPT

## 2024-02-08 PROCEDURE — 93010 ELECTROCARDIOGRAM REPORT: CPT | Performed by: SPECIALIST

## 2024-02-08 PROCEDURE — 6370000000 HC RX 637 (ALT 250 FOR IP): Performed by: STUDENT IN AN ORGANIZED HEALTH CARE EDUCATION/TRAINING PROGRAM

## 2024-02-08 PROCEDURE — 85025 COMPLETE CBC W/AUTO DIFF WBC: CPT

## 2024-02-08 PROCEDURE — 75710 ARTERY X-RAYS ARM/LEG: CPT

## 2024-02-08 PROCEDURE — 70450 CT HEAD/BRAIN W/O DYE: CPT

## 2024-02-08 PROCEDURE — 0042T CT BRAIN PERFUSION: CPT

## 2024-02-08 RX ORDER — DEXTROSE MONOHYDRATE 100 MG/ML
INJECTION, SOLUTION INTRAVENOUS CONTINUOUS PRN
Status: DISCONTINUED | OUTPATIENT
Start: 2024-02-08 | End: 2024-02-27 | Stop reason: HOSPADM

## 2024-02-08 RX ORDER — ASPIRIN 300 MG/1
300 SUPPOSITORY RECTAL DAILY
Status: DISCONTINUED | OUTPATIENT
Start: 2024-02-09 | End: 2024-02-09

## 2024-02-08 RX ORDER — SODIUM CHLORIDE 0.9 % (FLUSH) 0.9 %
5-40 SYRINGE (ML) INJECTION EVERY 12 HOURS SCHEDULED
Status: DISCONTINUED | OUTPATIENT
Start: 2024-02-08 | End: 2024-02-22

## 2024-02-08 RX ORDER — MIDAZOLAM HYDROCHLORIDE 1 MG/ML
INJECTION INTRAMUSCULAR; INTRAVENOUS
Status: COMPLETED
Start: 2024-02-08 | End: 2024-02-08

## 2024-02-08 RX ORDER — ENOXAPARIN SODIUM 100 MG/ML
30 INJECTION SUBCUTANEOUS 2 TIMES DAILY
Status: DISCONTINUED | OUTPATIENT
Start: 2024-02-09 | End: 2024-02-09

## 2024-02-08 RX ORDER — POLYETHYLENE GLYCOL 3350 17 G/17G
17 POWDER, FOR SOLUTION ORAL DAILY PRN
Status: DISCONTINUED | OUTPATIENT
Start: 2024-02-08 | End: 2024-02-27 | Stop reason: HOSPADM

## 2024-02-08 RX ORDER — LIDOCAINE HYDROCHLORIDE 10 MG/ML
INJECTION, SOLUTION EPIDURAL; INFILTRATION; INTRACAUDAL; PERINEURAL PRN
Status: COMPLETED | OUTPATIENT
Start: 2024-02-08 | End: 2024-02-08

## 2024-02-08 RX ORDER — ATORVASTATIN CALCIUM 40 MG/1
80 TABLET, FILM COATED ORAL NIGHTLY
Status: DISCONTINUED | OUTPATIENT
Start: 2024-02-08 | End: 2024-02-27 | Stop reason: HOSPADM

## 2024-02-08 RX ORDER — ONDANSETRON 4 MG/1
4 TABLET, ORALLY DISINTEGRATING ORAL EVERY 8 HOURS PRN
Status: DISCONTINUED | OUTPATIENT
Start: 2024-02-08 | End: 2024-02-27 | Stop reason: HOSPADM

## 2024-02-08 RX ORDER — SODIUM CHLORIDE, SODIUM LACTATE, POTASSIUM CHLORIDE, CALCIUM CHLORIDE 600; 310; 30; 20 MG/100ML; MG/100ML; MG/100ML; MG/100ML
INJECTION, SOLUTION INTRAVENOUS CONTINUOUS
Status: DISCONTINUED | OUTPATIENT
Start: 2024-02-08 | End: 2024-02-09

## 2024-02-08 RX ORDER — MIDAZOLAM HYDROCHLORIDE 2 MG/2ML
2 INJECTION, SOLUTION INTRAMUSCULAR; INTRAVENOUS ONCE
Status: DISCONTINUED | OUTPATIENT
Start: 2024-02-08 | End: 2024-02-08

## 2024-02-08 RX ORDER — MIDAZOLAM HYDROCHLORIDE 1 MG/ML
2 INJECTION INTRAMUSCULAR; INTRAVENOUS ONCE
Status: COMPLETED | OUTPATIENT
Start: 2024-02-08 | End: 2024-02-08

## 2024-02-08 RX ORDER — ENOXAPARIN SODIUM 100 MG/ML
40 INJECTION SUBCUTANEOUS DAILY
Status: DISCONTINUED | OUTPATIENT
Start: 2024-02-09 | End: 2024-02-08 | Stop reason: DRUGHIGH

## 2024-02-08 RX ORDER — ONDANSETRON 2 MG/ML
4 INJECTION INTRAMUSCULAR; INTRAVENOUS EVERY 6 HOURS PRN
Status: DISCONTINUED | OUTPATIENT
Start: 2024-02-08 | End: 2024-02-27 | Stop reason: HOSPADM

## 2024-02-08 RX ORDER — SODIUM CHLORIDE 0.9 % (FLUSH) 0.9 %
5-40 SYRINGE (ML) INJECTION PRN
Status: DISCONTINUED | OUTPATIENT
Start: 2024-02-08 | End: 2024-02-27

## 2024-02-08 RX ORDER — ASPIRIN 81 MG/1
81 TABLET, CHEWABLE ORAL DAILY
Status: DISCONTINUED | OUTPATIENT
Start: 2024-02-09 | End: 2024-02-09

## 2024-02-08 RX ORDER — SODIUM CHLORIDE 9 MG/ML
INJECTION, SOLUTION INTRAVENOUS PRN
Status: DISCONTINUED | OUTPATIENT
Start: 2024-02-08 | End: 2024-02-27 | Stop reason: HOSPADM

## 2024-02-08 RX ORDER — ENOXAPARIN SODIUM 100 MG/ML
30 INJECTION SUBCUTANEOUS 2 TIMES DAILY
Status: DISCONTINUED | OUTPATIENT
Start: 2024-02-08 | End: 2024-02-08

## 2024-02-08 RX ADMIN — MIDAZOLAM HYDROCHLORIDE 2 MG: 1 INJECTION INTRAMUSCULAR; INTRAVENOUS at 14:14

## 2024-02-08 RX ADMIN — MIDAZOLAM HYDROCHLORIDE 2 MG: 1 INJECTION, SOLUTION INTRAMUSCULAR; INTRAVENOUS at 14:14

## 2024-02-08 RX ADMIN — IOPAMIDOL 120 ML: 755 INJECTION, SOLUTION INTRAVENOUS at 13:45

## 2024-02-08 RX ADMIN — LIDOCAINE HYDROCHLORIDE 10 ML: 10 INJECTION, SOLUTION EPIDURAL; INFILTRATION; INTRACAUDAL; PERINEURAL at 14:58

## 2024-02-08 RX ADMIN — SODIUM CHLORIDE 8.51 UNITS/HR: 9 INJECTION, SOLUTION INTRAVENOUS at 23:55

## 2024-02-08 RX ADMIN — SODIUM CHLORIDE, POTASSIUM CHLORIDE, SODIUM LACTATE AND CALCIUM CHLORIDE: 600; 310; 30; 20 INJECTION, SOLUTION INTRAVENOUS at 18:11

## 2024-02-08 RX ADMIN — SODIUM CHLORIDE 10.82 UNITS/HR: 9 INJECTION, SOLUTION INTRAVENOUS at 15:48

## 2024-02-08 RX ADMIN — SODIUM CHLORIDE, PRESERVATIVE FREE 10 ML: 5 INJECTION INTRAVENOUS at 20:17

## 2024-02-08 RX ADMIN — IOHEXOL 36 ML: 300 INJECTION, SOLUTION INTRAVENOUS at 15:21

## 2024-02-08 NOTE — PROGRESS NOTES
1447  Patient to IR for emergent mechanical thrombectomy, patient still sedated from CT scan, and became agitated and not following commands, patient then intubated by anesthesia, unable to perform complete neuro assessment    Date/Time Last Known Well Time: 02/08/2024 @ 1230    Date/Time Discovery of Stroke Symptoms: 02/08/2024 @ 1230    NIHSS upon arrival: 18 per VIRAJ Jarvis note    Time to IR Suite: 1447    Time of Arterial Puncture: 1459      Start of IA Infusion of tPA or  1st pass of recanalization device in   Target vessel: 1510    Time of Revascularization: 1513    Pretreatment TICI: 0    Post treatment TICI: 3      Procedure Stop Time(When sterile drape is off): 1525    Time of first set of VS, neuro cks, groin, and pulse checks: 1530      Time transfer to ICU: 1710      Time of last VS, neuro, groin, and pulse checks documentation before ICU caring for pt: 1700        1635  Spoke with patient's brother and provided update on patient status, brother, Henry, , reports patient has a mother that is living, but would prefer to be contacted if needed      1700  TRANSFER - OUT REPORT:    Verbal report given to MAR Watters on Ronald Caro  being transferred to ICU 7 for routine progression of patient care       Report consisted of patient's Situation, Background, Assessment and   Recommendations(SBAR).     Information from the following report(s) Nurse Handoff Report was reviewed with the receiving nurse.           Lines:   Peripheral IV 02/08/24 Left;Anterior Forearm (Active)   Site Assessment Clean, dry & intact 02/08/24 1421   Line Status Blood return noted;Flushed 02/08/24 1421   Phlebitis Assessment No symptoms 02/08/24 1421   Infiltration Assessment 0 02/08/24 1421       Peripheral IV 02/08/24 Right Antecubital (Active)   Site Assessment Clean, dry & intact 02/08/24 1457   Line Status Blood return noted;Flushed 02/08/24 1457   Phlebitis Assessment No symptoms 02/08/24 1457   Infiltration
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
## 2024-02-08 NOTE — BRIEF OP NOTE
Neuro-Interventional Surgery - Brief procedure note      Patient: Ronald Caro MRN: [id number]     YOB: 1970  Age: 53 y.o.  Sex: adult      Service: Neuro-Interventional Surgery    Date of Procedure: 2/8/2024    Pre-Procedure Diagnosis: Acute stroke, LVO    Post-Procedure Diagnosis: SAME    Procedure(s): Mechanical thrombectomy: L MCA    Vessels selected: Left common carotid artery, Left internal carotid artery, and Left Middle cerebral artery    Brief Description of Procedure: Occlusion on dominant M2 branch of left middle cerebral area identified with a large area of distal hypoperfusion (TICI 2A).  Mechanical thrombectomy performed with TICI 3 recanalization.  No immediate complications.    Right common femoral arterial puncture site hemostasis achieved by deploying 8 F Angio-Seal closure device without complications.  No hematoma or oozing noted.  Distal right lower extremity pulses remain unchanged post hemostasis.  Sterile dressing applied over the puncture site.     Anesthesia:General    Estimated Blood Loss:  30 ml.    Specimens:  None    Implants:  None    Apparent Intraoperative Complications:  None immediate    Patient Condition:  Stable    Disposition:  Intensive care unit    Attestation:  I performed the procedure.        Signed By: Sabino Toure MD     February 8, 2024     [facility] NEUROINTERVENTIONAL SURGERY
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
## 2024-02-09 ENCOUNTER — APPOINTMENT (OUTPATIENT)
Facility: HOSPITAL | Age: 54
DRG: 030 | End: 2024-02-09
Payer: COMMERCIAL

## 2024-02-09 ENCOUNTER — APPOINTMENT (OUTPATIENT)
Facility: HOSPITAL | Age: 54
DRG: 030 | End: 2024-02-09
Attending: STUDENT IN AN ORGANIZED HEALTH CARE EDUCATION/TRAINING PROGRAM
Payer: COMMERCIAL

## 2024-02-09 PROBLEM — I51.3 APICAL MURAL THROMBUS: Status: ACTIVE | Noted: 2024-02-09

## 2024-02-09 PROBLEM — F14.10 COCAINE ABUSE (HCC): Status: ACTIVE | Noted: 2024-02-09

## 2024-02-09 PROBLEM — E11.65 UNCONTROLLED TYPE 2 DIABETES MELLITUS WITH HYPERGLYCEMIA (HCC): Status: ACTIVE | Noted: 2024-02-09

## 2024-02-09 PROBLEM — E78.5 HYPERLIPIDEMIA: Status: ACTIVE | Noted: 2024-02-09

## 2024-02-09 PROBLEM — I10 PRIMARY HYPERTENSION: Status: ACTIVE | Noted: 2024-02-09

## 2024-02-09 PROBLEM — I63.512 CEREBROVASCULAR ACCIDENT (CVA) DUE TO OCCLUSION OF LEFT MIDDLE CEREBRAL ARTERY (HCC): Status: ACTIVE | Noted: 2024-02-09

## 2024-02-09 LAB
ANION GAP SERPL CALC-SCNC: 3 MMOL/L (ref 5–15)
ANION GAP SERPL CALC-SCNC: 6 MMOL/L (ref 5–15)
ANION GAP SERPL CALC-SCNC: 6 MMOL/L (ref 5–15)
ANION GAP SERPL CALC-SCNC: 8 MMOL/L (ref 5–15)
APTT PPP: 26.6 SEC (ref 22.1–31)
BUN SERPL-MCNC: 23 MG/DL (ref 6–20)
BUN SERPL-MCNC: 28 MG/DL (ref 6–20)
BUN SERPL-MCNC: 32 MG/DL (ref 6–20)
BUN SERPL-MCNC: 34 MG/DL (ref 6–20)
BUN/CREAT SERPL: 19 (ref 12–20)
BUN/CREAT SERPL: 23 (ref 12–20)
BUN/CREAT SERPL: 24 (ref 12–20)
BUN/CREAT SERPL: 25 (ref 12–20)
CALCIUM SERPL-MCNC: 9.2 MG/DL (ref 8.5–10.1)
CALCIUM SERPL-MCNC: 9.2 MG/DL (ref 8.5–10.1)
CALCIUM SERPL-MCNC: 9.6 MG/DL (ref 8.5–10.1)
CALCIUM SERPL-MCNC: 9.9 MG/DL (ref 8.5–10.1)
CHLORIDE SERPL-SCNC: 101 MMOL/L (ref 97–108)
CHLORIDE SERPL-SCNC: 102 MMOL/L (ref 97–108)
CHLORIDE SERPL-SCNC: 102 MMOL/L (ref 97–108)
CHLORIDE SERPL-SCNC: 103 MMOL/L (ref 97–108)
CHOLEST SERPL-MCNC: 171 MG/DL
CO2 SERPL-SCNC: 23 MMOL/L (ref 21–32)
CO2 SERPL-SCNC: 28 MMOL/L (ref 21–32)
CREAT SERPL-MCNC: 1.18 MG/DL (ref 0.7–1.3)
CREAT SERPL-MCNC: 1.23 MG/DL (ref 0.7–1.3)
CREAT SERPL-MCNC: 1.28 MG/DL (ref 0.7–1.3)
CREAT SERPL-MCNC: 1.42 MG/DL (ref 0.7–1.3)
ECHO BSA: 2.39 M2
ERYTHROCYTE [DISTWIDTH] IN BLOOD BY AUTOMATED COUNT: 13.3 % (ref 11.5–14.5)
ERYTHROCYTE [DISTWIDTH] IN BLOOD BY AUTOMATED COUNT: 13.5 % (ref 11.5–14.5)
EST. AVERAGE GLUCOSE BLD GHB EST-MCNC: 326 MG/DL
GLUCOSE BLD STRIP.AUTO-MCNC: 117 MG/DL (ref 65–117)
GLUCOSE BLD STRIP.AUTO-MCNC: 124 MG/DL (ref 65–117)
GLUCOSE BLD STRIP.AUTO-MCNC: 129 MG/DL (ref 65–117)
GLUCOSE BLD STRIP.AUTO-MCNC: 137 MG/DL (ref 65–117)
GLUCOSE BLD STRIP.AUTO-MCNC: 139 MG/DL (ref 65–117)
GLUCOSE BLD STRIP.AUTO-MCNC: 145 MG/DL (ref 65–117)
GLUCOSE BLD STRIP.AUTO-MCNC: 148 MG/DL (ref 65–117)
GLUCOSE BLD STRIP.AUTO-MCNC: 158 MG/DL (ref 65–117)
GLUCOSE BLD STRIP.AUTO-MCNC: 161 MG/DL (ref 65–117)
GLUCOSE BLD STRIP.AUTO-MCNC: 169 MG/DL (ref 65–117)
GLUCOSE BLD STRIP.AUTO-MCNC: 180 MG/DL (ref 65–117)
GLUCOSE BLD STRIP.AUTO-MCNC: 185 MG/DL (ref 65–117)
GLUCOSE BLD STRIP.AUTO-MCNC: 189 MG/DL (ref 65–117)
GLUCOSE BLD STRIP.AUTO-MCNC: 194 MG/DL (ref 65–117)
GLUCOSE BLD STRIP.AUTO-MCNC: 195 MG/DL (ref 65–117)
GLUCOSE BLD STRIP.AUTO-MCNC: 200 MG/DL (ref 65–117)
GLUCOSE BLD STRIP.AUTO-MCNC: 204 MG/DL (ref 65–117)
GLUCOSE BLD STRIP.AUTO-MCNC: 206 MG/DL (ref 65–117)
GLUCOSE BLD STRIP.AUTO-MCNC: 212 MG/DL (ref 65–117)
GLUCOSE BLD STRIP.AUTO-MCNC: 229 MG/DL (ref 65–117)
GLUCOSE BLD STRIP.AUTO-MCNC: 247 MG/DL (ref 65–117)
GLUCOSE BLD STRIP.AUTO-MCNC: 87 MG/DL (ref 65–117)
GLUCOSE BLD STRIP.AUTO-MCNC: 98 MG/DL (ref 65–117)
GLUCOSE SERPL-MCNC: 102 MG/DL (ref 65–100)
GLUCOSE SERPL-MCNC: 180 MG/DL (ref 65–100)
GLUCOSE SERPL-MCNC: 203 MG/DL (ref 65–100)
GLUCOSE SERPL-MCNC: 207 MG/DL (ref 65–100)
HBA1C MFR BLD: 13 % (ref 4–5.6)
HCT VFR BLD AUTO: 47.2 % (ref 36.6–50.3)
HCT VFR BLD AUTO: 49.2 % (ref 36.6–50.3)
HDLC SERPL-MCNC: 49 MG/DL
HDLC SERPL: 3.5 (ref 0–5)
HGB BLD-MCNC: 15.5 G/DL (ref 12.1–17)
HGB BLD-MCNC: 17.2 G/DL (ref 12.1–17)
INR PPP: 1 (ref 0.9–1.1)
LDLC SERPL CALC-MCNC: 99 MG/DL (ref 0–100)
MCH RBC QN AUTO: 28.5 PG (ref 26–34)
MCH RBC QN AUTO: 29.7 PG (ref 26–34)
MCHC RBC AUTO-ENTMCNC: 32.8 G/DL (ref 30–36.5)
MCHC RBC AUTO-ENTMCNC: 35 G/DL (ref 30–36.5)
MCV RBC AUTO: 84.8 FL (ref 80–99)
MCV RBC AUTO: 86.9 FL (ref 80–99)
NRBC # BLD: 0 K/UL (ref 0–0.01)
NRBC # BLD: 0 K/UL (ref 0–0.01)
NRBC BLD-RTO: 0 PER 100 WBC
NRBC BLD-RTO: 0 PER 100 WBC
PLATELET # BLD AUTO: 183 K/UL (ref 150–400)
PLATELET # BLD AUTO: 194 K/UL (ref 150–400)
PMV BLD AUTO: 11.9 FL (ref 8.9–12.9)
PMV BLD AUTO: 12.7 FL (ref 8.9–12.9)
POTASSIUM SERPL-SCNC: 3.9 MMOL/L (ref 3.5–5.1)
POTASSIUM SERPL-SCNC: 3.9 MMOL/L (ref 3.5–5.1)
POTASSIUM SERPL-SCNC: 4.1 MMOL/L (ref 3.5–5.1)
POTASSIUM SERPL-SCNC: 4.7 MMOL/L (ref 3.5–5.1)
PROTHROMBIN TIME: 10.9 SEC (ref 9–11.1)
RBC # BLD AUTO: 5.43 M/UL (ref 4.1–5.7)
RBC # BLD AUTO: 5.8 M/UL (ref 4.1–5.7)
SERVICE CMNT-IMP: ABNORMAL
SERVICE CMNT-IMP: NORMAL
SODIUM SERPL-SCNC: 133 MMOL/L (ref 136–145)
SODIUM SERPL-SCNC: 134 MMOL/L (ref 136–145)
SODIUM SERPL-SCNC: 135 MMOL/L (ref 136–145)
SODIUM SERPL-SCNC: 136 MMOL/L (ref 136–145)
THERAPEUTIC RANGE: NORMAL SECS (ref 58–77)
TRIGL SERPL-MCNC: 115 MG/DL
UFH PPP CHRO-ACNC: <0.1 IU/ML
VLDLC SERPL CALC-MCNC: 23 MG/DL
WBC # BLD AUTO: 18.5 K/UL (ref 4.1–11.1)
WBC # BLD AUTO: 19.4 K/UL (ref 4.1–11.1)

## 2024-02-09 PROCEDURE — 85027 COMPLETE CBC AUTOMATED: CPT

## 2024-02-09 PROCEDURE — 2580000003 HC RX 258

## 2024-02-09 PROCEDURE — C8924 2D TTE W OR W/O FOL W/CON,FU: HCPCS

## 2024-02-09 PROCEDURE — 2580000003 HC RX 258: Performed by: STUDENT IN AN ORGANIZED HEALTH CARE EDUCATION/TRAINING PROGRAM

## 2024-02-09 PROCEDURE — 99223 1ST HOSP IP/OBS HIGH 75: CPT | Performed by: PSYCHIATRY & NEUROLOGY

## 2024-02-09 PROCEDURE — 70450 CT HEAD/BRAIN W/O DYE: CPT

## 2024-02-09 PROCEDURE — 6360000002 HC RX W HCPCS: Performed by: NURSE PRACTITIONER

## 2024-02-09 PROCEDURE — 6360000004 HC RX CONTRAST MEDICATION: Performed by: STUDENT IN AN ORGANIZED HEALTH CARE EDUCATION/TRAINING PROGRAM

## 2024-02-09 PROCEDURE — APPNB45 APP NON BILLABLE 31-45 MINUTES

## 2024-02-09 PROCEDURE — 97167 OT EVAL HIGH COMPLEX 60 MIN: CPT

## 2024-02-09 PROCEDURE — 93308 TTE F-UP OR LMTD: CPT | Performed by: SPECIALIST

## 2024-02-09 PROCEDURE — 6370000000 HC RX 637 (ALT 250 FOR IP): Performed by: STUDENT IN AN ORGANIZED HEALTH CARE EDUCATION/TRAINING PROGRAM

## 2024-02-09 PROCEDURE — 82962 GLUCOSE BLOOD TEST: CPT

## 2024-02-09 PROCEDURE — 97161 PT EVAL LOW COMPLEX 20 MIN: CPT

## 2024-02-09 PROCEDURE — 93325 DOPPLER ECHO COLOR FLOW MAPG: CPT | Performed by: SPECIALIST

## 2024-02-09 PROCEDURE — 85610 PROTHROMBIN TIME: CPT

## 2024-02-09 PROCEDURE — 85730 THROMBOPLASTIN TIME PARTIAL: CPT

## 2024-02-09 PROCEDURE — 83036 HEMOGLOBIN GLYCOSYLATED A1C: CPT

## 2024-02-09 PROCEDURE — 97166 OT EVAL MOD COMPLEX 45 MIN: CPT

## 2024-02-09 PROCEDURE — 6370000000 HC RX 637 (ALT 250 FOR IP)

## 2024-02-09 PROCEDURE — 2580000003 HC RX 258: Performed by: NURSE PRACTITIONER

## 2024-02-09 PROCEDURE — 85520 HEPARIN ASSAY: CPT

## 2024-02-09 PROCEDURE — 97530 THERAPEUTIC ACTIVITIES: CPT

## 2024-02-09 PROCEDURE — 92610 EVALUATE SWALLOWING FUNCTION: CPT

## 2024-02-09 PROCEDURE — 2000000000 HC ICU R&B

## 2024-02-09 PROCEDURE — 92523 SPEECH SOUND LANG COMPREHEN: CPT

## 2024-02-09 PROCEDURE — 36415 COLL VENOUS BLD VENIPUNCTURE: CPT

## 2024-02-09 PROCEDURE — 80048 BASIC METABOLIC PNL TOTAL CA: CPT

## 2024-02-09 PROCEDURE — C8929 TTE W OR WO FOL WCON,DOPPLER: HCPCS

## 2024-02-09 PROCEDURE — 97535 SELF CARE MNGMENT TRAINING: CPT

## 2024-02-09 PROCEDURE — 80061 LIPID PANEL: CPT

## 2024-02-09 RX ORDER — DEXTROSE AND SODIUM CHLORIDE 5; .45 G/100ML; G/100ML
INJECTION, SOLUTION INTRAVENOUS CONTINUOUS
Status: DISCONTINUED | OUTPATIENT
Start: 2024-02-09 | End: 2024-02-09

## 2024-02-09 RX ORDER — ASPIRIN 81 MG/1
81 TABLET, CHEWABLE ORAL DAILY
Status: DISCONTINUED | OUTPATIENT
Start: 2024-02-09 | End: 2024-02-09

## 2024-02-09 RX ORDER — ASPIRIN 300 MG/1
300 SUPPOSITORY RECTAL DAILY
Status: DISCONTINUED | OUTPATIENT
Start: 2024-02-09 | End: 2024-02-09

## 2024-02-09 RX ORDER — DEXTROSE, SODIUM CHLORIDE, SODIUM LACTATE, POTASSIUM CHLORIDE, AND CALCIUM CHLORIDE 5; .6; .31; .03; .02 G/100ML; G/100ML; G/100ML; G/100ML; G/100ML
INJECTION, SOLUTION INTRAVENOUS CONTINUOUS
Status: DISCONTINUED | OUTPATIENT
Start: 2024-02-09 | End: 2024-02-10

## 2024-02-09 RX ORDER — ASPIRIN 81 MG/1
324 TABLET, CHEWABLE ORAL DAILY
Status: DISCONTINUED | OUTPATIENT
Start: 2024-02-09 | End: 2024-02-09

## 2024-02-09 RX ORDER — HEPARIN SODIUM 10000 [USP'U]/100ML
5-30 INJECTION, SOLUTION INTRAVENOUS CONTINUOUS
Status: DISCONTINUED | OUTPATIENT
Start: 2024-02-09 | End: 2024-02-19

## 2024-02-09 RX ORDER — ASPIRIN 300 MG/1
300 SUPPOSITORY RECTAL DAILY
Status: DISCONTINUED | OUTPATIENT
Start: 2024-02-09 | End: 2024-02-10

## 2024-02-09 RX ADMIN — SODIUM CHLORIDE, PRESERVATIVE FREE 10 ML: 5 INJECTION INTRAVENOUS at 08:01

## 2024-02-09 RX ADMIN — PERFLUTREN 1.5 ML: 6.52 INJECTION, SUSPENSION INTRAVENOUS at 09:50

## 2024-02-09 RX ADMIN — SODIUM CHLORIDE, POTASSIUM CHLORIDE, SODIUM LACTATE AND CALCIUM CHLORIDE: 600; 310; 30; 20 INJECTION, SOLUTION INTRAVENOUS at 01:25

## 2024-02-09 RX ADMIN — SODIUM CHLORIDE, SODIUM LACTATE, POTASSIUM CHLORIDE, CALCIUM CHLORIDE AND DEXTROSE MONOHYDRATE: 5; 600; 310; 30; 20 INJECTION, SOLUTION INTRAVENOUS at 22:34

## 2024-02-09 RX ADMIN — SODIUM CHLORIDE 4.06 UNITS/HR: 9 INJECTION, SOLUTION INTRAVENOUS at 21:58

## 2024-02-09 RX ADMIN — ASPIRIN 300 MG: 300 SUPPOSITORY RECTAL at 15:57

## 2024-02-09 RX ADMIN — SODIUM CHLORIDE, PRESERVATIVE FREE 10 ML: 5 INJECTION INTRAVENOUS at 20:18

## 2024-02-09 RX ADMIN — SODIUM CHLORIDE 2.92 UNITS/HR: 9 INJECTION, SOLUTION INTRAVENOUS at 06:06

## 2024-02-09 RX ADMIN — SODIUM CHLORIDE, SODIUM LACTATE, POTASSIUM CHLORIDE, CALCIUM CHLORIDE AND DEXTROSE MONOHYDRATE: 5; 600; 310; 30; 20 INJECTION, SOLUTION INTRAVENOUS at 11:30

## 2024-02-09 RX ADMIN — DEXTROSE AND SODIUM CHLORIDE: 5; 450 INJECTION, SOLUTION INTRAVENOUS at 07:06

## 2024-02-09 RX ADMIN — HEPARIN SODIUM 9 UNITS/KG/HR: 10000 INJECTION, SOLUTION INTRAVENOUS at 17:09

## 2024-02-10 ENCOUNTER — APPOINTMENT (OUTPATIENT)
Facility: HOSPITAL | Age: 54
DRG: 030 | End: 2024-02-10
Payer: COMMERCIAL

## 2024-02-10 LAB
ANION GAP SERPL CALC-SCNC: 9 MMOL/L (ref 5–15)
BASOPHILS # BLD: 0.1 K/UL (ref 0–0.1)
BASOPHILS NFR BLD: 1 % (ref 0–1)
BUN SERPL-MCNC: 17 MG/DL (ref 6–20)
BUN/CREAT SERPL: 18 (ref 12–20)
CALCIUM SERPL-MCNC: 9 MG/DL (ref 8.5–10.1)
CHLORIDE SERPL-SCNC: 104 MMOL/L (ref 97–108)
CO2 SERPL-SCNC: 21 MMOL/L (ref 21–32)
CREAT SERPL-MCNC: 0.94 MG/DL (ref 0.7–1.3)
DIFFERENTIAL METHOD BLD: ABNORMAL
EOSINOPHIL # BLD: 0 K/UL (ref 0–0.4)
EOSINOPHIL NFR BLD: 0 % (ref 0–7)
ERYTHROCYTE [DISTWIDTH] IN BLOOD BY AUTOMATED COUNT: 13.6 % (ref 11.5–14.5)
GLUCOSE BLD STRIP.AUTO-MCNC: 102 MG/DL (ref 65–117)
GLUCOSE BLD STRIP.AUTO-MCNC: 103 MG/DL (ref 65–117)
GLUCOSE BLD STRIP.AUTO-MCNC: 139 MG/DL (ref 65–117)
GLUCOSE BLD STRIP.AUTO-MCNC: 176 MG/DL (ref 65–117)
GLUCOSE BLD STRIP.AUTO-MCNC: 177 MG/DL (ref 65–117)
GLUCOSE BLD STRIP.AUTO-MCNC: 181 MG/DL (ref 65–117)
GLUCOSE BLD STRIP.AUTO-MCNC: 183 MG/DL (ref 65–117)
GLUCOSE BLD STRIP.AUTO-MCNC: 191 MG/DL (ref 65–117)
GLUCOSE BLD STRIP.AUTO-MCNC: 201 MG/DL (ref 65–117)
GLUCOSE BLD STRIP.AUTO-MCNC: 220 MG/DL (ref 65–117)
GLUCOSE BLD STRIP.AUTO-MCNC: 226 MG/DL (ref 65–117)
GLUCOSE BLD STRIP.AUTO-MCNC: 227 MG/DL (ref 65–117)
GLUCOSE BLD STRIP.AUTO-MCNC: 231 MG/DL (ref 65–117)
GLUCOSE BLD STRIP.AUTO-MCNC: 232 MG/DL (ref 65–117)
GLUCOSE BLD STRIP.AUTO-MCNC: 232 MG/DL (ref 65–117)
GLUCOSE BLD STRIP.AUTO-MCNC: 290 MG/DL (ref 65–117)
GLUCOSE BLD STRIP.AUTO-MCNC: 299 MG/DL (ref 65–117)
GLUCOSE SERPL-MCNC: 207 MG/DL (ref 65–100)
HCT VFR BLD AUTO: 46.4 % (ref 36.6–50.3)
HGB BLD-MCNC: 15.7 G/DL (ref 12.1–17)
IMM GRANULOCYTES # BLD AUTO: 0.1 K/UL (ref 0–0.04)
IMM GRANULOCYTES NFR BLD AUTO: 1 % (ref 0–0.5)
LYMPHOCYTES # BLD: 1.7 K/UL (ref 0.8–3.5)
LYMPHOCYTES NFR BLD: 13 % (ref 12–49)
MCH RBC QN AUTO: 29.6 PG (ref 26–34)
MCHC RBC AUTO-ENTMCNC: 33.8 G/DL (ref 30–36.5)
MCV RBC AUTO: 87.5 FL (ref 80–99)
MONOCYTES # BLD: 0.8 K/UL (ref 0–1)
MONOCYTES NFR BLD: 6 % (ref 5–13)
NEUTS SEG # BLD: 10.3 K/UL (ref 1.8–8)
NEUTS SEG NFR BLD: 79 % (ref 32–75)
NRBC # BLD: 0 K/UL (ref 0–0.01)
NRBC BLD-RTO: 0 PER 100 WBC
PLATELET # BLD AUTO: 164 K/UL (ref 150–400)
PMV BLD AUTO: 11.8 FL (ref 8.9–12.9)
POTASSIUM SERPL-SCNC: 4.2 MMOL/L (ref 3.5–5.1)
RBC # BLD AUTO: 5.3 M/UL (ref 4.1–5.7)
SERVICE CMNT-IMP: ABNORMAL
SERVICE CMNT-IMP: NORMAL
SERVICE CMNT-IMP: NORMAL
SODIUM SERPL-SCNC: 134 MMOL/L (ref 136–145)
UFH PPP CHRO-ACNC: 0.15 IU/ML
UFH PPP CHRO-ACNC: 0.6 IU/ML
UFH PPP CHRO-ACNC: <0.1 IU/ML
UFH PPP CHRO-ACNC: <0.1 IU/ML
WBC # BLD AUTO: 13 K/UL (ref 4.1–11.1)

## 2024-02-10 PROCEDURE — 70450 CT HEAD/BRAIN W/O DYE: CPT

## 2024-02-10 PROCEDURE — 36415 COLL VENOUS BLD VENIPUNCTURE: CPT

## 2024-02-10 PROCEDURE — 71045 X-RAY EXAM CHEST 1 VIEW: CPT

## 2024-02-10 PROCEDURE — 6370000000 HC RX 637 (ALT 250 FOR IP): Performed by: NURSE PRACTITIONER

## 2024-02-10 PROCEDURE — 2580000003 HC RX 258: Performed by: NURSE PRACTITIONER

## 2024-02-10 PROCEDURE — 99233 SBSQ HOSP IP/OBS HIGH 50: CPT | Performed by: NURSE PRACTITIONER

## 2024-02-10 PROCEDURE — 85520 HEPARIN ASSAY: CPT

## 2024-02-10 PROCEDURE — 85025 COMPLETE CBC W/AUTO DIFF WBC: CPT

## 2024-02-10 PROCEDURE — 99233 SBSQ HOSP IP/OBS HIGH 50: CPT | Performed by: PSYCHIATRY & NEUROLOGY

## 2024-02-10 PROCEDURE — 74018 RADEX ABDOMEN 1 VIEW: CPT

## 2024-02-10 PROCEDURE — 6360000002 HC RX W HCPCS: Performed by: NURSE PRACTITIONER

## 2024-02-10 PROCEDURE — 6370000000 HC RX 637 (ALT 250 FOR IP): Performed by: INTERNAL MEDICINE

## 2024-02-10 PROCEDURE — 2580000003 HC RX 258: Performed by: STUDENT IN AN ORGANIZED HEALTH CARE EDUCATION/TRAINING PROGRAM

## 2024-02-10 PROCEDURE — 82962 GLUCOSE BLOOD TEST: CPT

## 2024-02-10 PROCEDURE — 2060000000 HC ICU INTERMEDIATE R&B

## 2024-02-10 PROCEDURE — 6360000002 HC RX W HCPCS

## 2024-02-10 PROCEDURE — 80048 BASIC METABOLIC PNL TOTAL CA: CPT

## 2024-02-10 PROCEDURE — APPNB45 APP NON BILLABLE 31-45 MINUTES

## 2024-02-10 PROCEDURE — 6370000000 HC RX 637 (ALT 250 FOR IP): Performed by: STUDENT IN AN ORGANIZED HEALTH CARE EDUCATION/TRAINING PROGRAM

## 2024-02-10 RX ORDER — SPIRONOLACTONE 25 MG/1
25 TABLET ORAL DAILY
Status: DISCONTINUED | OUTPATIENT
Start: 2024-02-10 | End: 2024-02-27 | Stop reason: HOSPADM

## 2024-02-10 RX ORDER — INSULIN GLARGINE 100 [IU]/ML
15 INJECTION, SOLUTION SUBCUTANEOUS DAILY
Status: DISCONTINUED | OUTPATIENT
Start: 2024-02-11 | End: 2024-02-13

## 2024-02-10 RX ORDER — INSULIN LISPRO 100 [IU]/ML
0-4 INJECTION, SOLUTION INTRAVENOUS; SUBCUTANEOUS NIGHTLY
Status: DISCONTINUED | OUTPATIENT
Start: 2024-02-10 | End: 2024-02-13

## 2024-02-10 RX ORDER — MIDAZOLAM HYDROCHLORIDE 1 MG/ML
INJECTION INTRAMUSCULAR; INTRAVENOUS
Status: COMPLETED
Start: 2024-02-10 | End: 2024-02-10

## 2024-02-10 RX ORDER — MIDAZOLAM HYDROCHLORIDE 2 MG/2ML
1 INJECTION, SOLUTION INTRAMUSCULAR; INTRAVENOUS ONCE
Status: COMPLETED | OUTPATIENT
Start: 2024-02-10 | End: 2024-02-10

## 2024-02-10 RX ORDER — INSULIN GLARGINE 100 [IU]/ML
10 INJECTION, SOLUTION SUBCUTANEOUS DAILY
Status: DISCONTINUED | OUTPATIENT
Start: 2024-02-10 | End: 2024-02-10

## 2024-02-10 RX ORDER — INSULIN LISPRO 100 [IU]/ML
0-8 INJECTION, SOLUTION INTRAVENOUS; SUBCUTANEOUS
Status: DISCONTINUED | OUTPATIENT
Start: 2024-02-10 | End: 2024-02-13

## 2024-02-10 RX ORDER — MIDAZOLAM HYDROCHLORIDE 2 MG/2ML
2 INJECTION, SOLUTION INTRAMUSCULAR; INTRAVENOUS ONCE
Status: COMPLETED | OUTPATIENT
Start: 2024-02-10 | End: 2024-02-10

## 2024-02-10 RX ADMIN — SODIUM CHLORIDE, SODIUM LACTATE, POTASSIUM CHLORIDE, CALCIUM CHLORIDE AND DEXTROSE MONOHYDRATE: 5; 600; 310; 30; 20 INJECTION, SOLUTION INTRAVENOUS at 11:01

## 2024-02-10 RX ADMIN — SODIUM CHLORIDE, PRESERVATIVE FREE 10 ML: 5 INJECTION INTRAVENOUS at 08:17

## 2024-02-10 RX ADMIN — INSULIN GLARGINE 10 UNITS: 100 INJECTION, SOLUTION SUBCUTANEOUS at 14:24

## 2024-02-10 RX ADMIN — INSULIN LISPRO 4 UNITS: 100 INJECTION, SOLUTION INTRAVENOUS; SUBCUTANEOUS at 16:58

## 2024-02-10 RX ADMIN — SODIUM CHLORIDE 18.53 UNITS/HR: 9 INJECTION, SOLUTION INTRAVENOUS at 06:07

## 2024-02-10 RX ADMIN — MIDAZOLAM 2 MG: 1 INJECTION INTRAMUSCULAR; INTRAVENOUS at 18:51

## 2024-02-10 RX ADMIN — ATORVASTATIN CALCIUM 80 MG: 40 TABLET, FILM COATED ORAL at 20:53

## 2024-02-10 RX ADMIN — MIDAZOLAM 1 MG: 1 INJECTION INTRAMUSCULAR; INTRAVENOUS at 18:14

## 2024-02-10 RX ADMIN — MIDAZOLAM HYDROCHLORIDE 1 MG: 2 INJECTION, SOLUTION INTRAMUSCULAR; INTRAVENOUS at 18:14

## 2024-02-10 RX ADMIN — SODIUM CHLORIDE, PRESERVATIVE FREE 10 ML: 5 INJECTION INTRAVENOUS at 20:53

## 2024-02-10 RX ADMIN — SPIRONOLACTONE 25 MG: 25 TABLET ORAL at 13:12

## 2024-02-10 RX ADMIN — MIDAZOLAM HYDROCHLORIDE 2 MG: 2 INJECTION, SOLUTION INTRAMUSCULAR; INTRAVENOUS at 18:51

## 2024-02-10 RX ADMIN — HEPARIN SODIUM 15 UNITS/KG/HR: 10000 INJECTION, SOLUTION INTRAVENOUS at 14:22

## 2024-02-11 ENCOUNTER — APPOINTMENT (OUTPATIENT)
Facility: HOSPITAL | Age: 54
DRG: 030 | End: 2024-02-11
Payer: COMMERCIAL

## 2024-02-11 LAB
BASOPHILS # BLD: 0 K/UL (ref 0–0.1)
BASOPHILS NFR BLD: 0 % (ref 0–1)
COMMENT:: NORMAL
DIFFERENTIAL METHOD BLD: ABNORMAL
EOSINOPHIL # BLD: 0 K/UL (ref 0–0.4)
EOSINOPHIL NFR BLD: 0 % (ref 0–7)
ERYTHROCYTE [DISTWIDTH] IN BLOOD BY AUTOMATED COUNT: 13.7 % (ref 11.5–14.5)
GLUCOSE BLD STRIP.AUTO-MCNC: 161 MG/DL (ref 65–117)
GLUCOSE BLD STRIP.AUTO-MCNC: 222 MG/DL (ref 65–117)
GLUCOSE BLD STRIP.AUTO-MCNC: 385 MG/DL (ref 65–117)
GLUCOSE BLD STRIP.AUTO-MCNC: 456 MG/DL (ref 65–117)
HCT VFR BLD AUTO: 45.8 % (ref 36.6–50.3)
HGB BLD-MCNC: 15.1 G/DL (ref 12.1–17)
IMM GRANULOCYTES # BLD AUTO: 0.1 K/UL (ref 0–0.04)
IMM GRANULOCYTES NFR BLD AUTO: 0 % (ref 0–0.5)
LYMPHOCYTES # BLD: 1.5 K/UL (ref 0.8–3.5)
LYMPHOCYTES NFR BLD: 11 % (ref 12–49)
MCH RBC QN AUTO: 28.8 PG (ref 26–34)
MCHC RBC AUTO-ENTMCNC: 33 G/DL (ref 30–36.5)
MCV RBC AUTO: 87.2 FL (ref 80–99)
MONOCYTES # BLD: 1 K/UL (ref 0–1)
MONOCYTES NFR BLD: 7 % (ref 5–13)
NEUTS SEG # BLD: 11.3 K/UL (ref 1.8–8)
NEUTS SEG NFR BLD: 82 % (ref 32–75)
NRBC # BLD: 0 K/UL (ref 0–0.01)
NRBC BLD-RTO: 0 PER 100 WBC
PLATELET # BLD AUTO: 223 K/UL (ref 150–400)
PMV BLD AUTO: 12.9 FL (ref 8.9–12.9)
RBC # BLD AUTO: 5.25 M/UL (ref 4.1–5.7)
SERVICE CMNT-IMP: ABNORMAL
SPECIMEN HOLD: NORMAL
UFH PPP CHRO-ACNC: 0.4 IU/ML
UFH PPP CHRO-ACNC: 0.49 IU/ML
UFH PPP CHRO-ACNC: 0.71 IU/ML
WBC # BLD AUTO: 14 K/UL (ref 4.1–11.1)

## 2024-02-11 PROCEDURE — 6370000000 HC RX 637 (ALT 250 FOR IP): Performed by: STUDENT IN AN ORGANIZED HEALTH CARE EDUCATION/TRAINING PROGRAM

## 2024-02-11 PROCEDURE — 85520 HEPARIN ASSAY: CPT

## 2024-02-11 PROCEDURE — 6370000000 HC RX 637 (ALT 250 FOR IP): Performed by: INTERNAL MEDICINE

## 2024-02-11 PROCEDURE — 6370000000 HC RX 637 (ALT 250 FOR IP): Performed by: FAMILY MEDICINE

## 2024-02-11 PROCEDURE — 85025 COMPLETE CBC W/AUTO DIFF WBC: CPT

## 2024-02-11 PROCEDURE — 36415 COLL VENOUS BLD VENIPUNCTURE: CPT

## 2024-02-11 PROCEDURE — 2580000003 HC RX 258: Performed by: STUDENT IN AN ORGANIZED HEALTH CARE EDUCATION/TRAINING PROGRAM

## 2024-02-11 PROCEDURE — 6360000002 HC RX W HCPCS: Performed by: NURSE PRACTITIONER

## 2024-02-11 PROCEDURE — 2060000000 HC ICU INTERMEDIATE R&B

## 2024-02-11 PROCEDURE — 94761 N-INVAS EAR/PLS OXIMETRY MLT: CPT

## 2024-02-11 PROCEDURE — 99232 SBSQ HOSP IP/OBS MODERATE 35: CPT | Performed by: PSYCHIATRY & NEUROLOGY

## 2024-02-11 PROCEDURE — 82962 GLUCOSE BLOOD TEST: CPT

## 2024-02-11 PROCEDURE — 70450 CT HEAD/BRAIN W/O DYE: CPT

## 2024-02-11 PROCEDURE — 6370000000 HC RX 637 (ALT 250 FOR IP): Performed by: NURSE PRACTITIONER

## 2024-02-11 RX ORDER — CARVEDILOL 6.25 MG/1
6.25 TABLET ORAL 2 TIMES DAILY WITH MEALS
Status: DISCONTINUED | OUTPATIENT
Start: 2024-02-11 | End: 2024-02-25

## 2024-02-11 RX ORDER — INSULIN LISPRO 100 [IU]/ML
8 INJECTION, SOLUTION INTRAVENOUS; SUBCUTANEOUS
Status: DISCONTINUED | OUTPATIENT
Start: 2024-02-11 | End: 2024-02-13

## 2024-02-11 RX ORDER — MIDAZOLAM HYDROCHLORIDE 2 MG/2ML
1 INJECTION, SOLUTION INTRAMUSCULAR; INTRAVENOUS ONCE
Status: DISCONTINUED | OUTPATIENT
Start: 2024-02-11 | End: 2024-02-25

## 2024-02-11 RX ADMIN — INSULIN GLARGINE 15 UNITS: 100 INJECTION, SOLUTION SUBCUTANEOUS at 08:40

## 2024-02-11 RX ADMIN — INSULIN LISPRO 8 UNITS: 100 INJECTION, SOLUTION INTRAVENOUS; SUBCUTANEOUS at 17:12

## 2024-02-11 RX ADMIN — INSULIN LISPRO 8 UNITS: 100 INJECTION, SOLUTION INTRAVENOUS; SUBCUTANEOUS at 11:32

## 2024-02-11 RX ADMIN — CARVEDILOL 6.25 MG: 12.5 TABLET, FILM COATED ORAL at 17:12

## 2024-02-11 RX ADMIN — SPIRONOLACTONE 25 MG: 25 TABLET ORAL at 08:40

## 2024-02-11 RX ADMIN — INSULIN LISPRO 8 UNITS: 100 INJECTION, SOLUTION INTRAVENOUS; SUBCUTANEOUS at 08:41

## 2024-02-11 RX ADMIN — CARVEDILOL 6.25 MG: 12.5 TABLET, FILM COATED ORAL at 11:33

## 2024-02-11 RX ADMIN — EMPAGLIFLOZIN 10 MG: 10 TABLET, FILM COATED ORAL at 11:32

## 2024-02-11 RX ADMIN — HEPARIN SODIUM 15.11 UNITS/KG/HR: 10000 INJECTION, SOLUTION INTRAVENOUS at 18:40

## 2024-02-11 RX ADMIN — SODIUM CHLORIDE, PRESERVATIVE FREE 10 ML: 5 INJECTION INTRAVENOUS at 19:59

## 2024-02-11 RX ADMIN — SODIUM CHLORIDE, PRESERVATIVE FREE 10 ML: 5 INJECTION INTRAVENOUS at 08:43

## 2024-02-11 RX ADMIN — ATORVASTATIN CALCIUM 80 MG: 40 TABLET, FILM COATED ORAL at 19:59

## 2024-02-11 RX ADMIN — HEPARIN SODIUM 17.02 UNITS/KG/HR: 10000 INJECTION, SOLUTION INTRAVENOUS at 08:50

## 2024-02-11 RX ADMIN — INSULIN LISPRO 2 UNITS: 100 INJECTION, SOLUTION INTRAVENOUS; SUBCUTANEOUS at 17:12
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
## 2024-02-11 NOTE — ACP (ADVANCE CARE PLANNING)
Spoke with patient's brother - Henry Caro ([phone]). Explained legal next of kin. Patient is . He does not have adult children. His mother has beginning dementia. His mother now lives with Henry. Henry said that he and patient's mother will make the decisions together. At this time brother Henry Caro is patient's legal next of kin.     JEFFREY RITCHIE

## 2024-02-12 ENCOUNTER — APPOINTMENT (OUTPATIENT)
Facility: HOSPITAL | Age: 54
DRG: 030 | End: 2024-02-12
Payer: COMMERCIAL

## 2024-02-12 LAB
ALBUMIN SERPL-MCNC: 3 G/DL (ref 3.5–5)
ALBUMIN/GLOB SERPL: 0.7 (ref 1.1–2.2)
ALP SERPL-CCNC: 94 U/L (ref 45–117)
ALT SERPL-CCNC: 21 U/L (ref 12–78)
ANION GAP SERPL CALC-SCNC: 5 MMOL/L (ref 5–15)
AST SERPL-CCNC: 25 U/L (ref 15–37)
BASOPHILS # BLD: 0.1 K/UL (ref 0–0.1)
BASOPHILS NFR BLD: 0 % (ref 0–1)
BILIRUB SERPL-MCNC: 1 MG/DL (ref 0.2–1)
BUN SERPL-MCNC: 24 MG/DL (ref 6–20)
BUN/CREAT SERPL: 22 (ref 12–20)
CALCIUM SERPL-MCNC: 10 MG/DL (ref 8.5–10.1)
CHLORIDE SERPL-SCNC: 106 MMOL/L (ref 97–108)
CO2 SERPL-SCNC: 27 MMOL/L (ref 21–32)
CREAT SERPL-MCNC: 1.08 MG/DL (ref 0.7–1.3)
DIFFERENTIAL METHOD BLD: ABNORMAL
EOSINOPHIL # BLD: 0.1 K/UL (ref 0–0.4)
EOSINOPHIL NFR BLD: 1 % (ref 0–7)
ERYTHROCYTE [DISTWIDTH] IN BLOOD BY AUTOMATED COUNT: 13.6 % (ref 11.5–14.5)
GLOBULIN SER CALC-MCNC: 4.1 G/DL (ref 2–4)
GLUCOSE BLD STRIP.AUTO-MCNC: 149 MG/DL (ref 65–117)
GLUCOSE BLD STRIP.AUTO-MCNC: 284 MG/DL (ref 65–117)
GLUCOSE BLD STRIP.AUTO-MCNC: 310 MG/DL (ref 65–117)
GLUCOSE BLD STRIP.AUTO-MCNC: 324 MG/DL (ref 65–117)
GLUCOSE BLD STRIP.AUTO-MCNC: 76 MG/DL (ref 65–117)
GLUCOSE SERPL-MCNC: 196 MG/DL (ref 65–100)
HCT VFR BLD AUTO: 44.5 % (ref 36.6–50.3)
HGB BLD-MCNC: 15 G/DL (ref 12.1–17)
IMM GRANULOCYTES # BLD AUTO: 0.1 K/UL (ref 0–0.04)
IMM GRANULOCYTES NFR BLD AUTO: 0 % (ref 0–0.5)
LYMPHOCYTES # BLD: 1.9 K/UL (ref 0.8–3.5)
LYMPHOCYTES NFR BLD: 14 % (ref 12–49)
MCH RBC QN AUTO: 29.2 PG (ref 26–34)
MCHC RBC AUTO-ENTMCNC: 33.7 G/DL (ref 30–36.5)
MCV RBC AUTO: 86.7 FL (ref 80–99)
MONOCYTES # BLD: 1.1 K/UL (ref 0–1)
MONOCYTES NFR BLD: 8 % (ref 5–13)
NEUTS SEG # BLD: 10.6 K/UL (ref 1.8–8)
NEUTS SEG NFR BLD: 77 % (ref 32–75)
NRBC # BLD: 0 K/UL (ref 0–0.01)
NRBC BLD-RTO: 0 PER 100 WBC
PLATELET # BLD AUTO: 230 K/UL (ref 150–400)
PMV BLD AUTO: 12 FL (ref 8.9–12.9)
POTASSIUM SERPL-SCNC: 4.1 MMOL/L (ref 3.5–5.1)
PROT SERPL-MCNC: 7.1 G/DL (ref 6.4–8.2)
RBC # BLD AUTO: 5.13 M/UL (ref 4.1–5.7)
SERVICE CMNT-IMP: ABNORMAL
SERVICE CMNT-IMP: NORMAL
SODIUM SERPL-SCNC: 138 MMOL/L (ref 136–145)
UFH PPP CHRO-ACNC: 0.25 IU/ML
UFH PPP CHRO-ACNC: 0.3 IU/ML
WBC # BLD AUTO: 13.7 K/UL (ref 4.1–11.1)

## 2024-02-12 PROCEDURE — 6370000000 HC RX 637 (ALT 250 FOR IP): Performed by: FAMILY MEDICINE

## 2024-02-12 PROCEDURE — 85520 HEPARIN ASSAY: CPT

## 2024-02-12 PROCEDURE — 2580000003 HC RX 258: Performed by: STUDENT IN AN ORGANIZED HEALTH CARE EDUCATION/TRAINING PROGRAM

## 2024-02-12 PROCEDURE — 6370000000 HC RX 637 (ALT 250 FOR IP): Performed by: NURSE PRACTITIONER

## 2024-02-12 PROCEDURE — 2060000000 HC ICU INTERMEDIATE R&B

## 2024-02-12 PROCEDURE — 74018 RADEX ABDOMEN 1 VIEW: CPT

## 2024-02-12 PROCEDURE — 36415 COLL VENOUS BLD VENIPUNCTURE: CPT

## 2024-02-12 PROCEDURE — 80053 COMPREHEN METABOLIC PANEL: CPT

## 2024-02-12 PROCEDURE — 99232 SBSQ HOSP IP/OBS MODERATE 35: CPT | Performed by: NURSE PRACTITIONER

## 2024-02-12 PROCEDURE — 92507 TX SP LANG VOICE COMM INDIV: CPT

## 2024-02-12 PROCEDURE — 6370000000 HC RX 637 (ALT 250 FOR IP): Performed by: INTERNAL MEDICINE

## 2024-02-12 PROCEDURE — 97530 THERAPEUTIC ACTIVITIES: CPT

## 2024-02-12 PROCEDURE — 82962 GLUCOSE BLOOD TEST: CPT

## 2024-02-12 PROCEDURE — 85025 COMPLETE CBC W/AUTO DIFF WBC: CPT

## 2024-02-12 PROCEDURE — 92526 ORAL FUNCTION THERAPY: CPT

## 2024-02-12 PROCEDURE — 6370000000 HC RX 637 (ALT 250 FOR IP): Performed by: STUDENT IN AN ORGANIZED HEALTH CARE EDUCATION/TRAINING PROGRAM

## 2024-02-12 PROCEDURE — 6360000002 HC RX W HCPCS: Performed by: NURSE PRACTITIONER

## 2024-02-12 RX ADMIN — CARVEDILOL 6.25 MG: 12.5 TABLET, FILM COATED ORAL at 07:44

## 2024-02-12 RX ADMIN — INSULIN LISPRO 8 UNITS: 100 INJECTION, SOLUTION INTRAVENOUS; SUBCUTANEOUS at 17:04

## 2024-02-12 RX ADMIN — INSULIN LISPRO 8 UNITS: 100 INJECTION, SOLUTION INTRAVENOUS; SUBCUTANEOUS at 11:24

## 2024-02-12 RX ADMIN — SPIRONOLACTONE 25 MG: 25 TABLET ORAL at 07:44

## 2024-02-12 RX ADMIN — SODIUM CHLORIDE, PRESERVATIVE FREE 10 ML: 5 INJECTION INTRAVENOUS at 07:54

## 2024-02-12 RX ADMIN — INSULIN LISPRO 6 UNITS: 100 INJECTION, SOLUTION INTRAVENOUS; SUBCUTANEOUS at 07:43

## 2024-02-12 RX ADMIN — INSULIN LISPRO 8 UNITS: 100 INJECTION, SOLUTION INTRAVENOUS; SUBCUTANEOUS at 07:43

## 2024-02-12 RX ADMIN — INSULIN LISPRO 4 UNITS: 100 INJECTION, SOLUTION INTRAVENOUS; SUBCUTANEOUS at 17:04

## 2024-02-12 RX ADMIN — INSULIN LISPRO 6 UNITS: 100 INJECTION, SOLUTION INTRAVENOUS; SUBCUTANEOUS at 11:24

## 2024-02-12 RX ADMIN — HEPARIN SODIUM 17 UNITS/KG/HR: 10000 INJECTION, SOLUTION INTRAVENOUS at 14:26

## 2024-02-12 RX ADMIN — EMPAGLIFLOZIN 10 MG: 10 TABLET, FILM COATED ORAL at 07:44

## 2024-02-12 RX ADMIN — INSULIN GLARGINE 15 UNITS: 100 INJECTION, SOLUTION SUBCUTANEOUS at 07:42

## 2024-02-13 PROBLEM — Z71.89 GOALS OF CARE, COUNSELING/DISCUSSION: Status: ACTIVE | Noted: 2024-02-13

## 2024-02-13 PROBLEM — Z51.5 PALLIATIVE CARE ENCOUNTER: Status: ACTIVE | Noted: 2024-02-13

## 2024-02-13 PROBLEM — R41.82 ALTERED MENTAL STATUS: Status: ACTIVE | Noted: 2024-02-13

## 2024-02-13 PROBLEM — R13.10 DYSPHAGIA: Status: ACTIVE | Noted: 2024-02-13

## 2024-02-13 PROBLEM — R47.01 APHASIA: Status: ACTIVE | Noted: 2024-02-13

## 2024-02-13 LAB
ALBUMIN SERPL-MCNC: 2.9 G/DL (ref 3.5–5)
ALBUMIN/GLOB SERPL: 0.7 (ref 1.1–2.2)
ALP SERPL-CCNC: 92 U/L (ref 45–117)
ALT SERPL-CCNC: 24 U/L (ref 12–78)
ANION GAP SERPL CALC-SCNC: 7 MMOL/L (ref 5–15)
AST SERPL-CCNC: 33 U/L (ref 15–37)
BASOPHILS # BLD: 0 K/UL (ref 0–0.1)
BASOPHILS NFR BLD: 0 % (ref 0–1)
BILIRUB SERPL-MCNC: 0.9 MG/DL (ref 0.2–1)
BUN SERPL-MCNC: 28 MG/DL (ref 6–20)
BUN/CREAT SERPL: 30 (ref 12–20)
CALCIUM SERPL-MCNC: 9.3 MG/DL (ref 8.5–10.1)
CHLORIDE SERPL-SCNC: 109 MMOL/L (ref 97–108)
CO2 SERPL-SCNC: 26 MMOL/L (ref 21–32)
CREAT SERPL-MCNC: 0.93 MG/DL (ref 0.7–1.3)
DIFFERENTIAL METHOD BLD: ABNORMAL
EOSINOPHIL # BLD: 0.1 K/UL (ref 0–0.4)
EOSINOPHIL NFR BLD: 1 % (ref 0–7)
ERYTHROCYTE [DISTWIDTH] IN BLOOD BY AUTOMATED COUNT: 14.6 % (ref 11.5–14.5)
GLOBULIN SER CALC-MCNC: 4.3 G/DL (ref 2–4)
GLUCOSE BLD STRIP.AUTO-MCNC: 120 MG/DL (ref 65–117)
GLUCOSE BLD STRIP.AUTO-MCNC: 557 MG/DL (ref 65–117)
GLUCOSE BLD STRIP.AUTO-MCNC: 66 MG/DL (ref 65–117)
GLUCOSE BLD STRIP.AUTO-MCNC: 87 MG/DL (ref 65–117)
GLUCOSE BLD STRIP.AUTO-MCNC: 94 MG/DL (ref 65–117)
GLUCOSE SERPL-MCNC: 61 MG/DL (ref 65–100)
HCT VFR BLD AUTO: 39.1 % (ref 36.6–50.3)
HGB BLD-MCNC: 10 G/DL (ref 12.1–17)
IMM GRANULOCYTES # BLD AUTO: 0 K/UL (ref 0–0.04)
IMM GRANULOCYTES NFR BLD AUTO: 0 % (ref 0–0.5)
LYMPHOCYTES # BLD: 1.2 K/UL (ref 0.8–3.5)
LYMPHOCYTES NFR BLD: 12 % (ref 12–49)
MCH RBC QN AUTO: 29 PG (ref 26–34)
MCHC RBC AUTO-ENTMCNC: 25.6 G/DL (ref 30–36.5)
MCV RBC AUTO: 113.3 FL (ref 80–99)
MONOCYTES # BLD: 0.8 K/UL (ref 0–1)
MONOCYTES NFR BLD: 8 % (ref 5–13)
NEUTS SEG # BLD: 8.2 K/UL (ref 1.8–8)
NEUTS SEG NFR BLD: 79 % (ref 32–75)
NRBC # BLD: 0 K/UL (ref 0–0.01)
NRBC BLD-RTO: 0 PER 100 WBC
PLATELET # BLD AUTO: 162 K/UL (ref 150–400)
PMV BLD AUTO: 12.2 FL (ref 8.9–12.9)
POTASSIUM SERPL-SCNC: 3.8 MMOL/L (ref 3.5–5.1)
PROT SERPL-MCNC: 7.2 G/DL (ref 6.4–8.2)
RBC # BLD AUTO: 3.45 M/UL (ref 4.1–5.7)
RBC MORPH BLD: ABNORMAL
RBC MORPH BLD: ABNORMAL
SERVICE CMNT-IMP: ABNORMAL
SERVICE CMNT-IMP: ABNORMAL
SERVICE CMNT-IMP: NORMAL
SODIUM SERPL-SCNC: 142 MMOL/L (ref 136–145)
UFH PPP CHRO-ACNC: <0.1 IU/ML
UFH PPP CHRO-ACNC: >1.5 IU/ML
WBC # BLD AUTO: 10.3 K/UL (ref 4.1–11.1)

## 2024-02-13 PROCEDURE — 6370000000 HC RX 637 (ALT 250 FOR IP): Performed by: STUDENT IN AN ORGANIZED HEALTH CARE EDUCATION/TRAINING PROGRAM

## 2024-02-13 PROCEDURE — 6370000000 HC RX 637 (ALT 250 FOR IP): Performed by: FAMILY MEDICINE

## 2024-02-13 PROCEDURE — 82962 GLUCOSE BLOOD TEST: CPT

## 2024-02-13 PROCEDURE — 80053 COMPREHEN METABOLIC PANEL: CPT

## 2024-02-13 PROCEDURE — 97112 NEUROMUSCULAR REEDUCATION: CPT

## 2024-02-13 PROCEDURE — 99232 SBSQ HOSP IP/OBS MODERATE 35: CPT | Performed by: NURSE PRACTITIONER

## 2024-02-13 PROCEDURE — 6360000002 HC RX W HCPCS: Performed by: NURSE PRACTITIONER

## 2024-02-13 PROCEDURE — 36415 COLL VENOUS BLD VENIPUNCTURE: CPT

## 2024-02-13 PROCEDURE — 6370000000 HC RX 637 (ALT 250 FOR IP): Performed by: INTERNAL MEDICINE

## 2024-02-13 PROCEDURE — 92507 TX SP LANG VOICE COMM INDIV: CPT | Performed by: SPEECH-LANGUAGE PATHOLOGIST

## 2024-02-13 PROCEDURE — 6370000000 HC RX 637 (ALT 250 FOR IP): Performed by: NURSE PRACTITIONER

## 2024-02-13 PROCEDURE — 6370000000 HC RX 637 (ALT 250 FOR IP): Performed by: HOSPITALIST

## 2024-02-13 PROCEDURE — 85520 HEPARIN ASSAY: CPT

## 2024-02-13 PROCEDURE — 2060000000 HC ICU INTERMEDIATE R&B

## 2024-02-13 PROCEDURE — 92526 ORAL FUNCTION THERAPY: CPT | Performed by: SPEECH-LANGUAGE PATHOLOGIST

## 2024-02-13 PROCEDURE — 99223 1ST HOSP IP/OBS HIGH 75: CPT | Performed by: NURSE PRACTITIONER

## 2024-02-13 PROCEDURE — 99221 1ST HOSP IP/OBS SF/LOW 40: CPT | Performed by: CLINICAL NURSE SPECIALIST

## 2024-02-13 PROCEDURE — 85025 COMPLETE CBC W/AUTO DIFF WBC: CPT

## 2024-02-13 PROCEDURE — 97530 THERAPEUTIC ACTIVITIES: CPT

## 2024-02-13 PROCEDURE — 6370000000 HC RX 637 (ALT 250 FOR IP): Performed by: CLINICAL NURSE SPECIALIST

## 2024-02-13 PROCEDURE — 2580000003 HC RX 258: Performed by: STUDENT IN AN ORGANIZED HEALTH CARE EDUCATION/TRAINING PROGRAM

## 2024-02-13 RX ORDER — INSULIN LISPRO 100 [IU]/ML
0-8 INJECTION, SOLUTION INTRAVENOUS; SUBCUTANEOUS EVERY 6 HOURS
Status: DISCONTINUED | OUTPATIENT
Start: 2024-02-13 | End: 2024-02-19

## 2024-02-13 RX ORDER — QUETIAPINE FUMARATE 25 MG/1
50 TABLET, FILM COATED ORAL 3 TIMES DAILY
Status: DISCONTINUED | OUTPATIENT
Start: 2024-02-13 | End: 2024-02-14

## 2024-02-13 RX ADMIN — CARVEDILOL 6.25 MG: 12.5 TABLET, FILM COATED ORAL at 10:32

## 2024-02-13 RX ADMIN — INSULIN LISPRO 8 UNITS: 100 INJECTION, SOLUTION INTRAVENOUS; SUBCUTANEOUS at 12:17

## 2024-02-13 RX ADMIN — QUETIAPINE FUMARATE 50 MG: 25 TABLET ORAL at 21:19

## 2024-02-13 RX ADMIN — HEPARIN SODIUM 21 UNITS/KG/HR: 10000 INJECTION, SOLUTION INTRAVENOUS at 10:26

## 2024-02-13 RX ADMIN — QUETIAPINE FUMARATE 50 MG: 25 TABLET ORAL at 13:43

## 2024-02-13 RX ADMIN — SODIUM CHLORIDE, PRESERVATIVE FREE 10 ML: 5 INJECTION INTRAVENOUS at 08:01

## 2024-02-13 RX ADMIN — SPIRONOLACTONE 25 MG: 25 TABLET ORAL at 10:33

## 2024-02-13 RX ADMIN — INSULIN LISPRO 8 UNITS: 100 INJECTION, SOLUTION INTRAVENOUS; SUBCUTANEOUS at 07:55

## 2024-02-13 RX ADMIN — HEPARIN SODIUM 18 UNITS/KG/HR: 10000 INJECTION, SOLUTION INTRAVENOUS at 13:11

## 2024-02-13 RX ADMIN — SODIUM CHLORIDE, PRESERVATIVE FREE 10 ML: 5 INJECTION INTRAVENOUS at 21:20

## 2024-02-13 RX ADMIN — INSULIN HUMAN 25 UNITS: 100 INJECTION, SUSPENSION SUBCUTANEOUS at 21:19

## 2024-02-13 RX ADMIN — ATORVASTATIN CALCIUM 80 MG: 40 TABLET, FILM COATED ORAL at 21:19

## 2024-02-13 RX ADMIN — INSULIN GLARGINE 15 UNITS: 100 INJECTION, SOLUTION SUBCUTANEOUS at 07:55

## 2024-02-13 RX ADMIN — CARVEDILOL 6.25 MG: 12.5 TABLET, FILM COATED ORAL at 17:06

## 2024-02-13 RX ADMIN — EMPAGLIFLOZIN 10 MG: 10 TABLET, FILM COATED ORAL at 10:33

## 2024-02-13 ASSESSMENT — PAIN SCALES - WONG BAKER: WONGBAKER_NUMERICALRESPONSE: 0

## 2024-02-14 LAB
ALBUMIN SERPL-MCNC: 2.9 G/DL (ref 3.5–5)
ALBUMIN/GLOB SERPL: 0.6 (ref 1.1–2.2)
ALP SERPL-CCNC: 97 U/L (ref 45–117)
ALT SERPL-CCNC: 29 U/L (ref 12–78)
ANION GAP SERPL CALC-SCNC: 5 MMOL/L (ref 5–15)
AST SERPL-CCNC: 38 U/L (ref 15–37)
BASOPHILS # BLD: 0 K/UL (ref 0–0.1)
BASOPHILS NFR BLD: 0 % (ref 0–1)
BILIRUB SERPL-MCNC: 0.8 MG/DL (ref 0.2–1)
BUN SERPL-MCNC: 29 MG/DL (ref 6–20)
BUN/CREAT SERPL: 28 (ref 12–20)
CALCIUM SERPL-MCNC: 9.4 MG/DL (ref 8.5–10.1)
CHLORIDE SERPL-SCNC: 107 MMOL/L (ref 97–108)
CO2 SERPL-SCNC: 26 MMOL/L (ref 21–32)
COMMENT:: NORMAL
CREAT SERPL-MCNC: 1.02 MG/DL (ref 0.7–1.3)
DIFFERENTIAL METHOD BLD: ABNORMAL
EOSINOPHIL # BLD: 0.3 K/UL (ref 0–0.4)
EOSINOPHIL NFR BLD: 3 % (ref 0–7)
ERYTHROCYTE [DISTWIDTH] IN BLOOD BY AUTOMATED COUNT: 13.9 % (ref 11.5–14.5)
GLOBULIN SER CALC-MCNC: 4.5 G/DL (ref 2–4)
GLUCOSE BLD STRIP.AUTO-MCNC: 125 MG/DL (ref 65–117)
GLUCOSE BLD STRIP.AUTO-MCNC: 216 MG/DL (ref 65–117)
GLUCOSE BLD STRIP.AUTO-MCNC: 229 MG/DL (ref 65–117)
GLUCOSE BLD STRIP.AUTO-MCNC: 386 MG/DL (ref 65–117)
GLUCOSE BLD STRIP.AUTO-MCNC: >600 MG/DL (ref 65–117)
GLUCOSE SERPL-MCNC: 172 MG/DL (ref 65–100)
HCT VFR BLD AUTO: 46.5 % (ref 36.6–50.3)
HGB BLD-MCNC: 15.7 G/DL (ref 12.1–17)
IMM GRANULOCYTES # BLD AUTO: 0 K/UL (ref 0–0.04)
IMM GRANULOCYTES NFR BLD AUTO: 0 % (ref 0–0.5)
LYMPHOCYTES # BLD: 2 K/UL (ref 0.8–3.5)
LYMPHOCYTES NFR BLD: 16 % (ref 12–49)
MCH RBC QN AUTO: 29.1 PG (ref 26–34)
MCHC RBC AUTO-ENTMCNC: 33.8 G/DL (ref 30–36.5)
MCV RBC AUTO: 86.3 FL (ref 80–99)
MONOCYTES # BLD: 1 K/UL (ref 0–1)
MONOCYTES NFR BLD: 8 % (ref 5–13)
NEUTS SEG # BLD: 9 K/UL (ref 1.8–8)
NEUTS SEG NFR BLD: 73 % (ref 32–75)
NRBC # BLD: 0 K/UL (ref 0–0.01)
NRBC BLD-RTO: 0 PER 100 WBC
PLATELET # BLD AUTO: 261 K/UL (ref 150–400)
PMV BLD AUTO: 12 FL (ref 8.9–12.9)
POTASSIUM SERPL-SCNC: 3.9 MMOL/L (ref 3.5–5.1)
PROT SERPL-MCNC: 7.4 G/DL (ref 6.4–8.2)
RBC # BLD AUTO: 5.39 M/UL (ref 4.1–5.7)
SERVICE CMNT-IMP: ABNORMAL
SODIUM SERPL-SCNC: 138 MMOL/L (ref 136–145)
SPECIMEN HOLD: NORMAL
UFH PPP CHRO-ACNC: 0.12 IU/ML
UFH PPP CHRO-ACNC: 0.48 IU/ML
UFH PPP CHRO-ACNC: >1.5 IU/ML
WBC # BLD AUTO: 12.3 K/UL (ref 4.1–11.1)

## 2024-02-14 PROCEDURE — 99232 SBSQ HOSP IP/OBS MODERATE 35: CPT | Performed by: CLINICAL NURSE SPECIALIST

## 2024-02-14 PROCEDURE — 85025 COMPLETE CBC W/AUTO DIFF WBC: CPT

## 2024-02-14 PROCEDURE — 85520 HEPARIN ASSAY: CPT

## 2024-02-14 PROCEDURE — 80053 COMPREHEN METABOLIC PANEL: CPT

## 2024-02-14 PROCEDURE — 6370000000 HC RX 637 (ALT 250 FOR IP): Performed by: CLINICAL NURSE SPECIALIST

## 2024-02-14 PROCEDURE — 6370000000 HC RX 637 (ALT 250 FOR IP): Performed by: INTERNAL MEDICINE

## 2024-02-14 PROCEDURE — 2580000003 HC RX 258: Performed by: STUDENT IN AN ORGANIZED HEALTH CARE EDUCATION/TRAINING PROGRAM

## 2024-02-14 PROCEDURE — 6370000000 HC RX 637 (ALT 250 FOR IP): Performed by: STUDENT IN AN ORGANIZED HEALTH CARE EDUCATION/TRAINING PROGRAM

## 2024-02-14 PROCEDURE — 36415 COLL VENOUS BLD VENIPUNCTURE: CPT

## 2024-02-14 PROCEDURE — 82962 GLUCOSE BLOOD TEST: CPT

## 2024-02-14 PROCEDURE — 2060000000 HC ICU INTERMEDIATE R&B

## 2024-02-14 PROCEDURE — 6370000000 HC RX 637 (ALT 250 FOR IP): Performed by: HOSPITALIST

## 2024-02-14 PROCEDURE — 99231 SBSQ HOSP IP/OBS SF/LOW 25: CPT | Performed by: NURSE PRACTITIONER

## 2024-02-14 RX ORDER — DIAZEPAM 5 MG/ML
2 INJECTION, SOLUTION INTRAMUSCULAR; INTRAVENOUS EVERY 4 HOURS PRN
Status: DISCONTINUED | OUTPATIENT
Start: 2024-02-14 | End: 2024-02-27 | Stop reason: HOSPADM

## 2024-02-14 RX ORDER — QUETIAPINE FUMARATE 25 MG/1
25 TABLET, FILM COATED ORAL 3 TIMES DAILY
Status: DISCONTINUED | OUTPATIENT
Start: 2024-02-14 | End: 2024-02-17

## 2024-02-14 RX ADMIN — QUETIAPINE FUMARATE 25 MG: 25 TABLET ORAL at 20:15

## 2024-02-14 RX ADMIN — SACUBITRIL AND VALSARTAN 1 TABLET: 24; 26 TABLET, FILM COATED ORAL at 08:30

## 2024-02-14 RX ADMIN — SACUBITRIL AND VALSARTAN 1 TABLET: 24; 26 TABLET, FILM COATED ORAL at 20:15

## 2024-02-14 RX ADMIN — INSULIN HUMAN 35 UNITS: 100 INJECTION, SUSPENSION SUBCUTANEOUS at 20:15

## 2024-02-14 RX ADMIN — CARVEDILOL 6.25 MG: 12.5 TABLET, FILM COATED ORAL at 18:12

## 2024-02-14 RX ADMIN — SODIUM CHLORIDE, PRESERVATIVE FREE 10 ML: 5 INJECTION INTRAVENOUS at 20:15

## 2024-02-14 RX ADMIN — QUETIAPINE FUMARATE 50 MG: 25 TABLET ORAL at 08:30

## 2024-02-14 RX ADMIN — QUETIAPINE FUMARATE 25 MG: 25 TABLET ORAL at 13:57

## 2024-02-14 RX ADMIN — SODIUM CHLORIDE, PRESERVATIVE FREE 10 ML: 5 INJECTION INTRAVENOUS at 08:35

## 2024-02-14 RX ADMIN — INSULIN HUMAN 25 UNITS: 100 INJECTION, SUSPENSION SUBCUTANEOUS at 08:28

## 2024-02-14 RX ADMIN — EMPAGLIFLOZIN 10 MG: 10 TABLET, FILM COATED ORAL at 08:30

## 2024-02-14 RX ADMIN — INSULIN LISPRO 8 UNITS: 100 INJECTION, SOLUTION INTRAVENOUS; SUBCUTANEOUS at 13:55

## 2024-02-14 RX ADMIN — INSULIN LISPRO 2 UNITS: 100 INJECTION, SOLUTION INTRAVENOUS; SUBCUTANEOUS at 05:47

## 2024-02-14 ASSESSMENT — PAIN SCALES - WONG BAKER
WONGBAKER_NUMERICALRESPONSE: 0
WONGBAKER_NUMERICALRESPONSE: 0

## 2024-02-15 ENCOUNTER — APPOINTMENT (OUTPATIENT)
Facility: HOSPITAL | Age: 54
DRG: 030 | End: 2024-02-15
Payer: COMMERCIAL

## 2024-02-15 LAB
COMMENT:: NORMAL
GLUCOSE BLD STRIP.AUTO-MCNC: 127 MG/DL (ref 65–117)
GLUCOSE BLD STRIP.AUTO-MCNC: 140 MG/DL (ref 65–117)
GLUCOSE BLD STRIP.AUTO-MCNC: 154 MG/DL (ref 65–117)
GLUCOSE BLD STRIP.AUTO-MCNC: 167 MG/DL (ref 65–117)
SERVICE CMNT-IMP: ABNORMAL
SPECIMEN HOLD: NORMAL
UFH PPP CHRO-ACNC: 0.43 IU/ML
UFH PPP CHRO-ACNC: 0.53 IU/ML
UFH PPP CHRO-ACNC: 0.55 IU/ML

## 2024-02-15 PROCEDURE — 2580000003 HC RX 258: Performed by: STUDENT IN AN ORGANIZED HEALTH CARE EDUCATION/TRAINING PROGRAM

## 2024-02-15 PROCEDURE — 6360000002 HC RX W HCPCS: Performed by: NURSE PRACTITIONER

## 2024-02-15 PROCEDURE — 92526 ORAL FUNCTION THERAPY: CPT

## 2024-02-15 PROCEDURE — 6370000000 HC RX 637 (ALT 250 FOR IP): Performed by: CLINICAL NURSE SPECIALIST

## 2024-02-15 PROCEDURE — 82962 GLUCOSE BLOOD TEST: CPT

## 2024-02-15 PROCEDURE — 6370000000 HC RX 637 (ALT 250 FOR IP): Performed by: STUDENT IN AN ORGANIZED HEALTH CARE EDUCATION/TRAINING PROGRAM

## 2024-02-15 PROCEDURE — 85520 HEPARIN ASSAY: CPT

## 2024-02-15 PROCEDURE — 6360000002 HC RX W HCPCS: Performed by: HOSPITALIST

## 2024-02-15 PROCEDURE — 36415 COLL VENOUS BLD VENIPUNCTURE: CPT

## 2024-02-15 PROCEDURE — 6370000000 HC RX 637 (ALT 250 FOR IP): Performed by: HOSPITALIST

## 2024-02-15 PROCEDURE — 92507 TX SP LANG VOICE COMM INDIV: CPT

## 2024-02-15 PROCEDURE — 70551 MRI BRAIN STEM W/O DYE: CPT

## 2024-02-15 PROCEDURE — 2060000000 HC ICU INTERMEDIATE R&B

## 2024-02-15 RX ADMIN — SACUBITRIL AND VALSARTAN 1 TABLET: 24; 26 TABLET, FILM COATED ORAL at 09:10

## 2024-02-15 RX ADMIN — SACUBITRIL AND VALSARTAN 1 TABLET: 24; 26 TABLET, FILM COATED ORAL at 21:43

## 2024-02-15 RX ADMIN — SODIUM CHLORIDE, PRESERVATIVE FREE 10 ML: 5 INJECTION INTRAVENOUS at 09:11

## 2024-02-15 RX ADMIN — INSULIN HUMAN 35 UNITS: 100 INJECTION, SUSPENSION SUBCUTANEOUS at 09:06

## 2024-02-15 RX ADMIN — QUETIAPINE FUMARATE 25 MG: 25 TABLET ORAL at 14:03

## 2024-02-15 RX ADMIN — DIAZEPAM 2 MG: 5 INJECTION, SOLUTION INTRAMUSCULAR; INTRAVENOUS at 00:54

## 2024-02-15 RX ADMIN — EMPAGLIFLOZIN 10 MG: 10 TABLET, FILM COATED ORAL at 09:04

## 2024-02-15 RX ADMIN — HEPARIN SODIUM 14 UNITS/KG/HR: 10000 INJECTION, SOLUTION INTRAVENOUS at 12:32

## 2024-02-15 RX ADMIN — ATORVASTATIN CALCIUM 80 MG: 40 TABLET, FILM COATED ORAL at 22:08

## 2024-02-15 RX ADMIN — QUETIAPINE FUMARATE 25 MG: 25 TABLET ORAL at 21:43

## 2024-02-15 RX ADMIN — CARVEDILOL 6.25 MG: 12.5 TABLET, FILM COATED ORAL at 17:42

## 2024-02-15 RX ADMIN — INSULIN HUMAN 35 UNITS: 100 INJECTION, SUSPENSION SUBCUTANEOUS at 22:12

## 2024-02-16 LAB
ALBUMIN SERPL-MCNC: 2.8 G/DL (ref 3.5–5)
ALBUMIN/GLOB SERPL: 0.8 (ref 1.1–2.2)
ALP SERPL-CCNC: 112 U/L (ref 45–117)
ALT SERPL-CCNC: 34 U/L (ref 12–78)
ANION GAP SERPL CALC-SCNC: 4 MMOL/L (ref 5–15)
AST SERPL-CCNC: 31 U/L (ref 15–37)
BASOPHILS # BLD: 0.1 K/UL (ref 0–0.1)
BASOPHILS NFR BLD: 1 % (ref 0–1)
BILIRUB SERPL-MCNC: 0.5 MG/DL (ref 0.2–1)
BUN SERPL-MCNC: 32 MG/DL (ref 6–20)
BUN/CREAT SERPL: 32 (ref 12–20)
CALCIUM SERPL-MCNC: 9.7 MG/DL (ref 8.5–10.1)
CHLORIDE SERPL-SCNC: 111 MMOL/L (ref 97–108)
CO2 SERPL-SCNC: 27 MMOL/L (ref 21–32)
CREAT SERPL-MCNC: 1 MG/DL (ref 0.7–1.3)
DIFFERENTIAL METHOD BLD: ABNORMAL
EOSINOPHIL # BLD: 0.2 K/UL (ref 0–0.4)
EOSINOPHIL NFR BLD: 2 % (ref 0–7)
ERYTHROCYTE [DISTWIDTH] IN BLOOD BY AUTOMATED COUNT: 13.8 % (ref 11.5–14.5)
GLOBULIN SER CALC-MCNC: 3.6 G/DL (ref 2–4)
GLUCOSE BLD STRIP.AUTO-MCNC: 114 MG/DL (ref 65–117)
GLUCOSE BLD STRIP.AUTO-MCNC: 235 MG/DL (ref 65–117)
GLUCOSE BLD STRIP.AUTO-MCNC: 91 MG/DL (ref 65–117)
GLUCOSE SERPL-MCNC: 192 MG/DL (ref 65–100)
HCT VFR BLD AUTO: 43.9 % (ref 36.6–50.3)
HGB BLD-MCNC: 14.9 G/DL (ref 12.1–17)
IMM GRANULOCYTES # BLD AUTO: 0 K/UL
IMM GRANULOCYTES NFR BLD AUTO: 0 %
LYMPHOCYTES # BLD: 2.5 K/UL (ref 0.8–3.5)
LYMPHOCYTES NFR BLD: 27 % (ref 12–49)
MAGNESIUM SERPL-MCNC: 2.4 MG/DL (ref 1.6–2.4)
MCH RBC QN AUTO: 29.5 PG (ref 26–34)
MCHC RBC AUTO-ENTMCNC: 33.9 G/DL (ref 30–36.5)
MCV RBC AUTO: 86.9 FL (ref 80–99)
MONOCYTES # BLD: 1.1 K/UL (ref 0–1)
MONOCYTES NFR BLD: 12 % (ref 5–13)
NEUTS BAND NFR BLD MANUAL: 2 % (ref 0–6)
NEUTS SEG # BLD: 5.2 K/UL (ref 1.8–8)
NEUTS SEG NFR BLD: 56 % (ref 32–75)
NRBC # BLD: 0 K/UL (ref 0–0.01)
NRBC BLD-RTO: 0 PER 100 WBC
PHOSPHATE SERPL-MCNC: 3.6 MG/DL (ref 2.6–4.7)
PLATELET # BLD AUTO: 301 K/UL (ref 150–400)
PMV BLD AUTO: 12 FL (ref 8.9–12.9)
POTASSIUM SERPL-SCNC: 4.1 MMOL/L (ref 3.5–5.1)
PROT SERPL-MCNC: 6.4 G/DL (ref 6.4–8.2)
RBC # BLD AUTO: 5.05 M/UL (ref 4.1–5.7)
RBC MORPH BLD: ABNORMAL
SERVICE CMNT-IMP: ABNORMAL
SERVICE CMNT-IMP: NORMAL
SERVICE CMNT-IMP: NORMAL
SODIUM SERPL-SCNC: 142 MMOL/L (ref 136–145)
UFH PPP CHRO-ACNC: 0.4 IU/ML
UFH PPP CHRO-ACNC: 0.44 IU/ML
UFH PPP CHRO-ACNC: 0.45 IU/ML
WBC # BLD AUTO: 9.1 K/UL (ref 4.1–11.1)
WBC MORPH BLD: ABNORMAL

## 2024-02-16 PROCEDURE — 6370000000 HC RX 637 (ALT 250 FOR IP): Performed by: HOSPITALIST

## 2024-02-16 PROCEDURE — 84100 ASSAY OF PHOSPHORUS: CPT

## 2024-02-16 PROCEDURE — 97530 THERAPEUTIC ACTIVITIES: CPT

## 2024-02-16 PROCEDURE — 6370000000 HC RX 637 (ALT 250 FOR IP): Performed by: INTERNAL MEDICINE

## 2024-02-16 PROCEDURE — 6370000000 HC RX 637 (ALT 250 FOR IP): Performed by: STUDENT IN AN ORGANIZED HEALTH CARE EDUCATION/TRAINING PROGRAM

## 2024-02-16 PROCEDURE — 6370000000 HC RX 637 (ALT 250 FOR IP): Performed by: CLINICAL NURSE SPECIALIST

## 2024-02-16 PROCEDURE — 82962 GLUCOSE BLOOD TEST: CPT

## 2024-02-16 PROCEDURE — 6360000002 HC RX W HCPCS: Performed by: NURSE PRACTITIONER

## 2024-02-16 PROCEDURE — 2060000000 HC ICU INTERMEDIATE R&B

## 2024-02-16 PROCEDURE — 85520 HEPARIN ASSAY: CPT

## 2024-02-16 PROCEDURE — 83735 ASSAY OF MAGNESIUM: CPT

## 2024-02-16 PROCEDURE — 97535 SELF CARE MNGMENT TRAINING: CPT

## 2024-02-16 PROCEDURE — 85025 COMPLETE CBC W/AUTO DIFF WBC: CPT

## 2024-02-16 PROCEDURE — 80053 COMPREHEN METABOLIC PANEL: CPT

## 2024-02-16 PROCEDURE — 2580000003 HC RX 258: Performed by: STUDENT IN AN ORGANIZED HEALTH CARE EDUCATION/TRAINING PROGRAM

## 2024-02-16 PROCEDURE — 36415 COLL VENOUS BLD VENIPUNCTURE: CPT

## 2024-02-16 RX ADMIN — QUETIAPINE FUMARATE 25 MG: 25 TABLET ORAL at 20:27

## 2024-02-16 RX ADMIN — SPIRONOLACTONE 25 MG: 25 TABLET ORAL at 09:16

## 2024-02-16 RX ADMIN — HEPARIN SODIUM 12 UNITS/KG/HR: 10000 INJECTION, SOLUTION INTRAVENOUS at 13:00

## 2024-02-16 RX ADMIN — CARVEDILOL 6.25 MG: 12.5 TABLET, FILM COATED ORAL at 18:27

## 2024-02-16 RX ADMIN — CARVEDILOL 6.25 MG: 12.5 TABLET, FILM COATED ORAL at 09:15

## 2024-02-16 RX ADMIN — SODIUM CHLORIDE, PRESERVATIVE FREE 5 ML: 5 INJECTION INTRAVENOUS at 09:43

## 2024-02-16 RX ADMIN — QUETIAPINE FUMARATE 25 MG: 25 TABLET ORAL at 09:16

## 2024-02-16 RX ADMIN — QUETIAPINE FUMARATE 25 MG: 25 TABLET ORAL at 15:59

## 2024-02-16 RX ADMIN — INSULIN LISPRO 2 UNITS: 100 INJECTION, SOLUTION INTRAVENOUS; SUBCUTANEOUS at 09:41

## 2024-02-16 RX ADMIN — ATORVASTATIN CALCIUM 80 MG: 40 TABLET, FILM COATED ORAL at 20:27

## 2024-02-16 RX ADMIN — SODIUM CHLORIDE, PRESERVATIVE FREE 10 ML: 5 INJECTION INTRAVENOUS at 20:21

## 2024-02-16 RX ADMIN — EMPAGLIFLOZIN 10 MG: 10 TABLET, FILM COATED ORAL at 09:15

## 2024-02-16 RX ADMIN — SACUBITRIL AND VALSARTAN 1 TABLET: 24; 26 TABLET, FILM COATED ORAL at 20:27

## 2024-02-16 RX ADMIN — INSULIN HUMAN 35 UNITS: 100 INJECTION, SUSPENSION SUBCUTANEOUS at 09:42

## 2024-02-16 RX ADMIN — SACUBITRIL AND VALSARTAN 1 TABLET: 24; 26 TABLET, FILM COATED ORAL at 09:15

## 2024-02-16 ASSESSMENT — PAIN SCALES - WONG BAKER
WONGBAKER_NUMERICALRESPONSE: 0
WONGBAKER_NUMERICALRESPONSE: 0

## 2024-02-17 LAB
ALBUMIN SERPL-MCNC: 2.7 G/DL (ref 3.5–5)
ALBUMIN/GLOB SERPL: 0.7 (ref 1.1–2.2)
ALP SERPL-CCNC: 103 U/L (ref 45–117)
ALT SERPL-CCNC: 38 U/L (ref 12–78)
ANION GAP SERPL CALC-SCNC: 5 MMOL/L (ref 5–15)
AST SERPL-CCNC: 35 U/L (ref 15–37)
BASOPHILS # BLD: 0 K/UL (ref 0–0.1)
BASOPHILS NFR BLD: 0 % (ref 0–1)
BILIRUB SERPL-MCNC: 0.5 MG/DL (ref 0.2–1)
BUN SERPL-MCNC: 32 MG/DL (ref 6–20)
BUN/CREAT SERPL: 31 (ref 12–20)
CALCIUM SERPL-MCNC: 9.2 MG/DL (ref 8.5–10.1)
CHLORIDE SERPL-SCNC: 109 MMOL/L (ref 97–108)
CO2 SERPL-SCNC: 28 MMOL/L (ref 21–32)
CREAT SERPL-MCNC: 1.03 MG/DL (ref 0.7–1.3)
DIFFERENTIAL METHOD BLD: NORMAL
EOSINOPHIL # BLD: 0 K/UL (ref 0–0.4)
EOSINOPHIL NFR BLD: 0 % (ref 0–7)
ERYTHROCYTE [DISTWIDTH] IN BLOOD BY AUTOMATED COUNT: 13.9 % (ref 11.5–14.5)
GLOBULIN SER CALC-MCNC: 3.7 G/DL (ref 2–4)
GLUCOSE BLD STRIP.AUTO-MCNC: 130 MG/DL (ref 65–117)
GLUCOSE BLD STRIP.AUTO-MCNC: 137 MG/DL (ref 65–117)
GLUCOSE BLD STRIP.AUTO-MCNC: 153 MG/DL (ref 65–117)
GLUCOSE BLD STRIP.AUTO-MCNC: 155 MG/DL (ref 65–117)
GLUCOSE BLD STRIP.AUTO-MCNC: 166 MG/DL (ref 65–117)
GLUCOSE BLD STRIP.AUTO-MCNC: 174 MG/DL (ref 65–117)
GLUCOSE BLD STRIP.AUTO-MCNC: 193 MG/DL (ref 65–117)
GLUCOSE BLD STRIP.AUTO-MCNC: 213 MG/DL (ref 65–117)
GLUCOSE SERPL-MCNC: 152 MG/DL (ref 65–100)
HCT VFR BLD AUTO: 43.7 % (ref 36.6–50.3)
HGB BLD-MCNC: 13.9 G/DL (ref 12.1–17)
IMM GRANULOCYTES # BLD AUTO: 0 K/UL
IMM GRANULOCYTES NFR BLD AUTO: 0 %
LYMPHOCYTES # BLD: 2.9 K/UL (ref 0.8–3.5)
LYMPHOCYTES NFR BLD: 28 % (ref 12–49)
MCH RBC QN AUTO: 28.7 PG (ref 26–34)
MCHC RBC AUTO-ENTMCNC: 31.8 G/DL (ref 30–36.5)
MCV RBC AUTO: 90.3 FL (ref 80–99)
MONOCYTES # BLD: 0.7 K/UL (ref 0–1)
MONOCYTES NFR BLD: 7 % (ref 5–13)
NEUTS BAND NFR BLD MANUAL: 2 % (ref 0–6)
NEUTS SEG # BLD: 6.8 K/UL (ref 1.8–8)
NEUTS SEG NFR BLD: 63 % (ref 32–75)
NRBC # BLD: 0 K/UL (ref 0–0.01)
NRBC BLD-RTO: 0 PER 100 WBC
PLATELET # BLD AUTO: 302 K/UL (ref 150–400)
PMV BLD AUTO: 12 FL (ref 8.9–12.9)
POTASSIUM SERPL-SCNC: 4.1 MMOL/L (ref 3.5–5.1)
PROT SERPL-MCNC: 6.4 G/DL (ref 6.4–8.2)
RBC # BLD AUTO: 4.84 M/UL (ref 4.1–5.7)
RBC MORPH BLD: NORMAL
SERVICE CMNT-IMP: ABNORMAL
SODIUM SERPL-SCNC: 142 MMOL/L (ref 136–145)
UFH PPP CHRO-ACNC: 0.25 IU/ML
UFH PPP CHRO-ACNC: 0.51 IU/ML
WBC # BLD AUTO: 10.4 K/UL (ref 4.1–11.1)

## 2024-02-17 PROCEDURE — C9113 INJ PANTOPRAZOLE SODIUM, VIA: HCPCS | Performed by: HOSPITALIST

## 2024-02-17 PROCEDURE — 85025 COMPLETE CBC W/AUTO DIFF WBC: CPT

## 2024-02-17 PROCEDURE — 80053 COMPREHEN METABOLIC PANEL: CPT

## 2024-02-17 PROCEDURE — 2580000003 HC RX 258: Performed by: STUDENT IN AN ORGANIZED HEALTH CARE EDUCATION/TRAINING PROGRAM

## 2024-02-17 PROCEDURE — 36415 COLL VENOUS BLD VENIPUNCTURE: CPT

## 2024-02-17 PROCEDURE — 2060000000 HC ICU INTERMEDIATE R&B

## 2024-02-17 PROCEDURE — 82962 GLUCOSE BLOOD TEST: CPT

## 2024-02-17 PROCEDURE — 6360000002 HC RX W HCPCS: Performed by: HOSPITALIST

## 2024-02-17 PROCEDURE — 6360000002 HC RX W HCPCS: Performed by: NURSE PRACTITIONER

## 2024-02-17 PROCEDURE — 2580000003 HC RX 258: Performed by: HOSPITALIST

## 2024-02-17 PROCEDURE — 6370000000 HC RX 637 (ALT 250 FOR IP): Performed by: STUDENT IN AN ORGANIZED HEALTH CARE EDUCATION/TRAINING PROGRAM

## 2024-02-17 PROCEDURE — 6370000000 HC RX 637 (ALT 250 FOR IP): Performed by: CLINICAL NURSE SPECIALIST

## 2024-02-17 PROCEDURE — 85520 HEPARIN ASSAY: CPT

## 2024-02-17 RX ORDER — QUETIAPINE FUMARATE 25 MG/1
25 TABLET, FILM COATED ORAL NIGHTLY
Status: DISCONTINUED | OUTPATIENT
Start: 2024-02-18 | End: 2024-02-20

## 2024-02-17 RX ADMIN — SODIUM CHLORIDE, PRESERVATIVE FREE 5 ML: 5 INJECTION INTRAVENOUS at 12:07

## 2024-02-17 RX ADMIN — SACUBITRIL AND VALSARTAN 1 TABLET: 24; 26 TABLET, FILM COATED ORAL at 23:24

## 2024-02-17 RX ADMIN — SODIUM CHLORIDE, PRESERVATIVE FREE 10 ML: 5 INJECTION INTRAVENOUS at 23:24

## 2024-02-17 RX ADMIN — INSULIN LISPRO 2 UNITS: 100 INJECTION, SOLUTION INTRAVENOUS; SUBCUTANEOUS at 19:11

## 2024-02-17 RX ADMIN — HEPARIN SODIUM 12 UNITS/KG/HR: 10000 INJECTION, SOLUTION INTRAVENOUS at 15:39

## 2024-02-17 RX ADMIN — HEPARIN SODIUM 12 UNITS/KG/HR: 10000 INJECTION, SOLUTION INTRAVENOUS at 02:52

## 2024-02-17 RX ADMIN — SODIUM CHLORIDE, PRESERVATIVE FREE 40 MG: 5 INJECTION INTRAVENOUS at 15:36

## 2024-02-17 RX ADMIN — ATORVASTATIN CALCIUM 80 MG: 40 TABLET, FILM COATED ORAL at 23:24

## 2024-02-17 ASSESSMENT — PAIN SCALES - WONG BAKER
WONGBAKER_NUMERICALRESPONSE: 0
WONGBAKER_NUMERICALRESPONSE: 0

## 2024-02-18 LAB
ALBUMIN SERPL-MCNC: 2.8 G/DL (ref 3.5–5)
ALBUMIN/GLOB SERPL: 0.7 (ref 1.1–2.2)
ALP SERPL-CCNC: 100 U/L (ref 45–117)
ALT SERPL-CCNC: 46 U/L (ref 12–78)
ANION GAP SERPL CALC-SCNC: 7 MMOL/L (ref 5–15)
AST SERPL-CCNC: 38 U/L (ref 15–37)
BASOPHILS # BLD: 0.1 K/UL (ref 0–0.1)
BASOPHILS NFR BLD: 1 % (ref 0–1)
BILIRUB SERPL-MCNC: 0.8 MG/DL (ref 0.2–1)
BUN SERPL-MCNC: 29 MG/DL (ref 6–20)
BUN/CREAT SERPL: 24 (ref 12–20)
CALCIUM SERPL-MCNC: 9.2 MG/DL (ref 8.5–10.1)
CHLORIDE SERPL-SCNC: 109 MMOL/L (ref 97–108)
CO2 SERPL-SCNC: 28 MMOL/L (ref 21–32)
CREAT SERPL-MCNC: 1.22 MG/DL (ref 0.7–1.3)
DIFFERENTIAL METHOD BLD: ABNORMAL
EOSINOPHIL # BLD: 0.3 K/UL (ref 0–0.4)
EOSINOPHIL NFR BLD: 3 % (ref 0–7)
ERYTHROCYTE [DISTWIDTH] IN BLOOD BY AUTOMATED COUNT: 13.7 % (ref 11.5–14.5)
GLOBULIN SER CALC-MCNC: 3.8 G/DL (ref 2–4)
GLUCOSE BLD STRIP.AUTO-MCNC: 156 MG/DL (ref 65–117)
GLUCOSE BLD STRIP.AUTO-MCNC: 171 MG/DL (ref 65–117)
GLUCOSE BLD STRIP.AUTO-MCNC: 332 MG/DL (ref 65–117)
GLUCOSE SERPL-MCNC: 145 MG/DL (ref 65–100)
HCT VFR BLD AUTO: 43.9 % (ref 36.6–50.3)
HGB BLD-MCNC: 13.7 G/DL (ref 12.1–17)
IMM GRANULOCYTES # BLD AUTO: 0.2 K/UL (ref 0–0.04)
IMM GRANULOCYTES NFR BLD AUTO: 2 % (ref 0–0.5)
LYMPHOCYTES # BLD: 3.2 K/UL (ref 0.8–3.5)
LYMPHOCYTES NFR BLD: 29 % (ref 12–49)
MCH RBC QN AUTO: 29.1 PG (ref 26–34)
MCHC RBC AUTO-ENTMCNC: 31.2 G/DL (ref 30–36.5)
MCV RBC AUTO: 93.4 FL (ref 80–99)
MONOCYTES # BLD: 0.9 K/UL (ref 0–1)
MONOCYTES NFR BLD: 8 % (ref 5–13)
NEUTS SEG # BLD: 6.5 K/UL (ref 1.8–8)
NEUTS SEG NFR BLD: 57 % (ref 32–75)
NRBC # BLD: 0 K/UL (ref 0–0.01)
NRBC BLD-RTO: 0 PER 100 WBC
PLATELET # BLD AUTO: 330 K/UL (ref 150–400)
PMV BLD AUTO: 11.9 FL (ref 8.9–12.9)
POTASSIUM SERPL-SCNC: 4.5 MMOL/L (ref 3.5–5.1)
PROT SERPL-MCNC: 6.6 G/DL (ref 6.4–8.2)
RBC # BLD AUTO: 4.7 M/UL (ref 4.1–5.7)
SERVICE CMNT-IMP: ABNORMAL
SODIUM SERPL-SCNC: 144 MMOL/L (ref 136–145)
UFH PPP CHRO-ACNC: 0.31 IU/ML
UFH PPP CHRO-ACNC: 0.46 IU/ML
WBC # BLD AUTO: 11.2 K/UL (ref 4.1–11.1)

## 2024-02-18 PROCEDURE — 6370000000 HC RX 637 (ALT 250 FOR IP): Performed by: HOSPITALIST

## 2024-02-18 PROCEDURE — C9113 INJ PANTOPRAZOLE SODIUM, VIA: HCPCS | Performed by: HOSPITALIST

## 2024-02-18 PROCEDURE — A4216 STERILE WATER/SALINE, 10 ML: HCPCS | Performed by: HOSPITALIST

## 2024-02-18 PROCEDURE — 85520 HEPARIN ASSAY: CPT

## 2024-02-18 PROCEDURE — 6370000000 HC RX 637 (ALT 250 FOR IP): Performed by: CLINICAL NURSE SPECIALIST

## 2024-02-18 PROCEDURE — 2060000000 HC ICU INTERMEDIATE R&B

## 2024-02-18 PROCEDURE — 6370000000 HC RX 637 (ALT 250 FOR IP): Performed by: STUDENT IN AN ORGANIZED HEALTH CARE EDUCATION/TRAINING PROGRAM

## 2024-02-18 PROCEDURE — 92526 ORAL FUNCTION THERAPY: CPT

## 2024-02-18 PROCEDURE — 85025 COMPLETE CBC W/AUTO DIFF WBC: CPT

## 2024-02-18 PROCEDURE — 82962 GLUCOSE BLOOD TEST: CPT

## 2024-02-18 PROCEDURE — 6360000002 HC RX W HCPCS: Performed by: NURSE PRACTITIONER

## 2024-02-18 PROCEDURE — 94761 N-INVAS EAR/PLS OXIMETRY MLT: CPT

## 2024-02-18 PROCEDURE — 80053 COMPREHEN METABOLIC PANEL: CPT

## 2024-02-18 PROCEDURE — 2580000003 HC RX 258: Performed by: HOSPITALIST

## 2024-02-18 PROCEDURE — 2580000003 HC RX 258: Performed by: STUDENT IN AN ORGANIZED HEALTH CARE EDUCATION/TRAINING PROGRAM

## 2024-02-18 PROCEDURE — 6360000002 HC RX W HCPCS: Performed by: HOSPITALIST

## 2024-02-18 RX ADMIN — SODIUM CHLORIDE, PRESERVATIVE FREE 40 MG: 5 INJECTION INTRAVENOUS at 13:11

## 2024-02-18 RX ADMIN — SODIUM CHLORIDE, PRESERVATIVE FREE 40 MG: 5 INJECTION INTRAVENOUS at 01:48

## 2024-02-18 RX ADMIN — ATORVASTATIN CALCIUM 80 MG: 40 TABLET, FILM COATED ORAL at 22:37

## 2024-02-18 RX ADMIN — QUETIAPINE FUMARATE 25 MG: 25 TABLET ORAL at 22:37

## 2024-02-18 RX ADMIN — INSULIN LISPRO 6 UNITS: 100 INJECTION, SOLUTION INTRAVENOUS; SUBCUTANEOUS at 17:46

## 2024-02-18 RX ADMIN — SODIUM CHLORIDE, PRESERVATIVE FREE 10 ML: 5 INJECTION INTRAVENOUS at 23:01

## 2024-02-18 RX ADMIN — HEPARIN SODIUM 12 UNITS/KG/HR: 10000 INJECTION, SOLUTION INTRAVENOUS at 22:37

## 2024-02-18 RX ADMIN — SACUBITRIL AND VALSARTAN 1 TABLET: 24; 26 TABLET, FILM COATED ORAL at 22:37

## 2024-02-18 RX ADMIN — CARVEDILOL 6.25 MG: 12.5 TABLET, FILM COATED ORAL at 17:44

## 2024-02-18 RX ADMIN — SODIUM CHLORIDE, PRESERVATIVE FREE 5 ML: 5 INJECTION INTRAVENOUS at 08:35

## 2024-02-18 ASSESSMENT — PAIN SCALES - WONG BAKER
WONGBAKER_NUMERICALRESPONSE: 0

## 2024-02-19 LAB
ALBUMIN SERPL-MCNC: 2.7 G/DL (ref 3.5–5)
ALBUMIN/GLOB SERPL: 0.7 (ref 1.1–2.2)
ALP SERPL-CCNC: 86 U/L (ref 45–117)
ALT SERPL-CCNC: 58 U/L (ref 12–78)
ANION GAP SERPL CALC-SCNC: 8 MMOL/L (ref 5–15)
AST SERPL-CCNC: 45 U/L (ref 15–37)
BASOPHILS # BLD: 0.1 K/UL (ref 0–0.1)
BASOPHILS NFR BLD: 1 % (ref 0–1)
BILIRUB SERPL-MCNC: 0.8 MG/DL (ref 0.2–1)
BUN SERPL-MCNC: 21 MG/DL (ref 6–20)
BUN/CREAT SERPL: 23 (ref 12–20)
CALCIUM SERPL-MCNC: 8.9 MG/DL (ref 8.5–10.1)
CHLORIDE SERPL-SCNC: 101 MMOL/L (ref 97–108)
CO2 SERPL-SCNC: 26 MMOL/L (ref 21–32)
CREAT SERPL-MCNC: 0.9 MG/DL (ref 0.7–1.3)
DIFFERENTIAL METHOD BLD: ABNORMAL
EOSINOPHIL # BLD: 0.3 K/UL (ref 0–0.4)
EOSINOPHIL NFR BLD: 3 % (ref 0–7)
ERYTHROCYTE [DISTWIDTH] IN BLOOD BY AUTOMATED COUNT: 13.2 % (ref 11.5–14.5)
GLOBULIN SER CALC-MCNC: 4 G/DL (ref 2–4)
GLUCOSE BLD STRIP.AUTO-MCNC: 143 MG/DL (ref 65–117)
GLUCOSE BLD STRIP.AUTO-MCNC: 207 MG/DL (ref 65–117)
GLUCOSE BLD STRIP.AUTO-MCNC: 215 MG/DL (ref 65–117)
GLUCOSE BLD STRIP.AUTO-MCNC: 220 MG/DL (ref 65–117)
GLUCOSE BLD STRIP.AUTO-MCNC: 223 MG/DL (ref 65–117)
GLUCOSE BLD STRIP.AUTO-MCNC: 239 MG/DL (ref 65–117)
GLUCOSE BLD STRIP.AUTO-MCNC: 587 MG/DL (ref 65–117)
GLUCOSE SERPL-MCNC: 288 MG/DL (ref 65–100)
HCT VFR BLD AUTO: 41.1 % (ref 36.6–50.3)
HGB BLD-MCNC: 13.2 G/DL (ref 12.1–17)
IMM GRANULOCYTES # BLD AUTO: 0.2 K/UL (ref 0–0.04)
IMM GRANULOCYTES NFR BLD AUTO: 2 % (ref 0–0.5)
LYMPHOCYTES # BLD: 2.7 K/UL (ref 0.8–3.5)
LYMPHOCYTES NFR BLD: 24 % (ref 12–49)
MCH RBC QN AUTO: 29 PG (ref 26–34)
MCHC RBC AUTO-ENTMCNC: 32.1 G/DL (ref 30–36.5)
MCV RBC AUTO: 90.3 FL (ref 80–99)
MONOCYTES # BLD: 0.9 K/UL (ref 0–1)
MONOCYTES NFR BLD: 8 % (ref 5–13)
NEUTS SEG # BLD: 7 K/UL (ref 1.8–8)
NEUTS SEG NFR BLD: 62 % (ref 32–75)
NRBC # BLD: 0 K/UL (ref 0–0.01)
NRBC BLD-RTO: 0 PER 100 WBC
PLATELET # BLD AUTO: 281 K/UL (ref 150–400)
PMV BLD AUTO: 11.7 FL (ref 8.9–12.9)
POTASSIUM SERPL-SCNC: 3.9 MMOL/L (ref 3.5–5.1)
PROT SERPL-MCNC: 6.7 G/DL (ref 6.4–8.2)
RBC # BLD AUTO: 4.55 M/UL (ref 4.1–5.7)
RBC MORPH BLD: ABNORMAL
SERVICE CMNT-IMP: ABNORMAL
SODIUM SERPL-SCNC: 135 MMOL/L (ref 136–145)
UFH PPP CHRO-ACNC: >1.5 IU/ML
WBC # BLD AUTO: 11.2 K/UL (ref 4.1–11.1)

## 2024-02-19 PROCEDURE — 2580000003 HC RX 258: Performed by: HOSPITALIST

## 2024-02-19 PROCEDURE — 97535 SELF CARE MNGMENT TRAINING: CPT

## 2024-02-19 PROCEDURE — 80053 COMPREHEN METABOLIC PANEL: CPT

## 2024-02-19 PROCEDURE — 6360000002 HC RX W HCPCS: Performed by: HOSPITALIST

## 2024-02-19 PROCEDURE — 6370000000 HC RX 637 (ALT 250 FOR IP): Performed by: STUDENT IN AN ORGANIZED HEALTH CARE EDUCATION/TRAINING PROGRAM

## 2024-02-19 PROCEDURE — 85025 COMPLETE CBC W/AUTO DIFF WBC: CPT

## 2024-02-19 PROCEDURE — 6370000000 HC RX 637 (ALT 250 FOR IP): Performed by: INTERNAL MEDICINE

## 2024-02-19 PROCEDURE — 92507 TX SP LANG VOICE COMM INDIV: CPT

## 2024-02-19 PROCEDURE — 6370000000 HC RX 637 (ALT 250 FOR IP): Performed by: CLINICAL NURSE SPECIALIST

## 2024-02-19 PROCEDURE — 97530 THERAPEUTIC ACTIVITIES: CPT

## 2024-02-19 PROCEDURE — 92526 ORAL FUNCTION THERAPY: CPT

## 2024-02-19 PROCEDURE — 36415 COLL VENOUS BLD VENIPUNCTURE: CPT

## 2024-02-19 PROCEDURE — 6360000002 HC RX W HCPCS: Performed by: NURSE PRACTITIONER

## 2024-02-19 PROCEDURE — 82962 GLUCOSE BLOOD TEST: CPT

## 2024-02-19 PROCEDURE — 85520 HEPARIN ASSAY: CPT

## 2024-02-19 PROCEDURE — C9113 INJ PANTOPRAZOLE SODIUM, VIA: HCPCS | Performed by: HOSPITALIST

## 2024-02-19 PROCEDURE — A4216 STERILE WATER/SALINE, 10 ML: HCPCS | Performed by: HOSPITALIST

## 2024-02-19 PROCEDURE — 2060000000 HC ICU INTERMEDIATE R&B

## 2024-02-19 PROCEDURE — 6370000000 HC RX 637 (ALT 250 FOR IP): Performed by: HOSPITALIST

## 2024-02-19 PROCEDURE — 2580000003 HC RX 258: Performed by: STUDENT IN AN ORGANIZED HEALTH CARE EDUCATION/TRAINING PROGRAM

## 2024-02-19 RX ORDER — INSULIN LISPRO 100 [IU]/ML
0-8 INJECTION, SOLUTION INTRAVENOUS; SUBCUTANEOUS
Status: DISCONTINUED | OUTPATIENT
Start: 2024-02-19 | End: 2024-02-27 | Stop reason: HOSPADM

## 2024-02-19 RX ORDER — CLOPIDOGREL BISULFATE 75 MG/1
75 TABLET ORAL DAILY
Status: DISCONTINUED | OUTPATIENT
Start: 2024-02-19 | End: 2024-02-27 | Stop reason: HOSPADM

## 2024-02-19 RX ADMIN — HEPARIN SODIUM 9 UNITS/KG/HR: 10000 INJECTION, SOLUTION INTRAVENOUS at 07:49

## 2024-02-19 RX ADMIN — CARVEDILOL 6.25 MG: 12.5 TABLET, FILM COATED ORAL at 08:24

## 2024-02-19 RX ADMIN — APIXABAN 2.5 MG: 2.5 TABLET, FILM COATED ORAL at 13:10

## 2024-02-19 RX ADMIN — SODIUM CHLORIDE, PRESERVATIVE FREE 10 ML: 5 INJECTION INTRAVENOUS at 21:52

## 2024-02-19 RX ADMIN — SODIUM CHLORIDE, PRESERVATIVE FREE 40 MG: 5 INJECTION INTRAVENOUS at 13:12

## 2024-02-19 RX ADMIN — SODIUM CHLORIDE, PRESERVATIVE FREE 40 MG: 5 INJECTION INTRAVENOUS at 00:26

## 2024-02-19 RX ADMIN — CARVEDILOL 6.25 MG: 12.5 TABLET, FILM COATED ORAL at 17:17

## 2024-02-19 RX ADMIN — SACUBITRIL AND VALSARTAN 1 TABLET: 24; 26 TABLET, FILM COATED ORAL at 21:34

## 2024-02-19 RX ADMIN — CLOPIDOGREL BISULFATE 75 MG: 75 TABLET, FILM COATED ORAL at 15:02

## 2024-02-19 RX ADMIN — ATORVASTATIN CALCIUM 80 MG: 40 TABLET, FILM COATED ORAL at 21:34

## 2024-02-19 RX ADMIN — INSULIN LISPRO 2 UNITS: 100 INJECTION, SOLUTION INTRAVENOUS; SUBCUTANEOUS at 12:07

## 2024-02-19 RX ADMIN — INSULIN HUMAN 30 UNITS: 100 INJECTION, SUSPENSION SUBCUTANEOUS at 21:25

## 2024-02-19 RX ADMIN — SODIUM CHLORIDE, PRESERVATIVE FREE 10 ML: 5 INJECTION INTRAVENOUS at 08:32

## 2024-02-19 RX ADMIN — INSULIN HUMAN 30 UNITS: 100 INJECTION, SUSPENSION SUBCUTANEOUS at 10:01

## 2024-02-19 RX ADMIN — EMPAGLIFLOZIN 10 MG: 10 TABLET, FILM COATED ORAL at 08:23

## 2024-02-19 RX ADMIN — SACUBITRIL AND VALSARTAN 1 TABLET: 24; 26 TABLET, FILM COATED ORAL at 08:23

## 2024-02-19 RX ADMIN — INSULIN LISPRO 2 UNITS: 100 INJECTION, SOLUTION INTRAVENOUS; SUBCUTANEOUS at 05:50

## 2024-02-19 RX ADMIN — INSULIN LISPRO 2 UNITS: 100 INJECTION, SOLUTION INTRAVENOUS; SUBCUTANEOUS at 00:26

## 2024-02-19 RX ADMIN — APIXABAN 5 MG: 5 TABLET, FILM COATED ORAL at 21:34

## 2024-02-19 RX ADMIN — INSULIN LISPRO 2 UNITS: 100 INJECTION, SOLUTION INTRAVENOUS; SUBCUTANEOUS at 21:24

## 2024-02-19 RX ADMIN — QUETIAPINE FUMARATE 25 MG: 25 TABLET ORAL at 21:34

## 2024-02-19 RX ADMIN — SPIRONOLACTONE 25 MG: 25 TABLET ORAL at 08:23

## 2024-02-19 ASSESSMENT — PAIN SCALES - WONG BAKER
WONGBAKER_NUMERICALRESPONSE: 0

## 2024-02-20 ENCOUNTER — APPOINTMENT (OUTPATIENT)
Facility: HOSPITAL | Age: 54
DRG: 030 | End: 2024-02-20
Payer: COMMERCIAL

## 2024-02-20 LAB
ALBUMIN SERPL-MCNC: 2.8 G/DL (ref 3.5–5)
ALBUMIN/GLOB SERPL: 0.7 (ref 1.1–2.2)
ALP SERPL-CCNC: 91 U/L (ref 45–117)
ALT SERPL-CCNC: 86 U/L (ref 12–78)
ANION GAP SERPL CALC-SCNC: 3 MMOL/L (ref 5–15)
AST SERPL-CCNC: 80 U/L (ref 15–37)
BASOPHILS # BLD: 0.1 K/UL (ref 0–0.1)
BASOPHILS NFR BLD: 0 % (ref 0–1)
BILIRUB SERPL-MCNC: 0.8 MG/DL (ref 0.2–1)
BUN SERPL-MCNC: 16 MG/DL (ref 6–20)
BUN/CREAT SERPL: 20 (ref 12–20)
CALCIUM SERPL-MCNC: 9.3 MG/DL (ref 8.5–10.1)
CHLORIDE SERPL-SCNC: 106 MMOL/L (ref 97–108)
CO2 SERPL-SCNC: 27 MMOL/L (ref 21–32)
CREAT SERPL-MCNC: 0.79 MG/DL (ref 0.7–1.3)
DIFFERENTIAL METHOD BLD: ABNORMAL
EOSINOPHIL # BLD: 0.2 K/UL (ref 0–0.4)
EOSINOPHIL NFR BLD: 2 % (ref 0–7)
ERYTHROCYTE [DISTWIDTH] IN BLOOD BY AUTOMATED COUNT: 13.2 % (ref 11.5–14.5)
GLOBULIN SER CALC-MCNC: 4.2 G/DL (ref 2–4)
GLUCOSE BLD STRIP.AUTO-MCNC: 112 MG/DL (ref 65–117)
GLUCOSE BLD STRIP.AUTO-MCNC: 159 MG/DL (ref 65–117)
GLUCOSE BLD STRIP.AUTO-MCNC: 229 MG/DL (ref 65–117)
GLUCOSE BLD STRIP.AUTO-MCNC: 273 MG/DL (ref 65–117)
GLUCOSE BLD STRIP.AUTO-MCNC: 85 MG/DL (ref 65–117)
GLUCOSE SERPL-MCNC: 74 MG/DL (ref 65–100)
HCT VFR BLD AUTO: 42.2 % (ref 36.6–50.3)
HGB BLD-MCNC: 14.1 G/DL (ref 12.1–17)
IMM GRANULOCYTES # BLD AUTO: 0.2 K/UL (ref 0–0.04)
IMM GRANULOCYTES NFR BLD AUTO: 2 % (ref 0–0.5)
LYMPHOCYTES # BLD: 2.2 K/UL (ref 0.8–3.5)
LYMPHOCYTES NFR BLD: 19 % (ref 12–49)
MCH RBC QN AUTO: 29.2 PG (ref 26–34)
MCHC RBC AUTO-ENTMCNC: 33.4 G/DL (ref 30–36.5)
MCV RBC AUTO: 87.4 FL (ref 80–99)
MONOCYTES # BLD: 0.9 K/UL (ref 0–1)
MONOCYTES NFR BLD: 8 % (ref 5–13)
NEUTS SEG # BLD: 8.2 K/UL (ref 1.8–8)
NEUTS SEG NFR BLD: 69 % (ref 32–75)
NRBC # BLD: 0 K/UL (ref 0–0.01)
NRBC BLD-RTO: 0 PER 100 WBC
PLATELET # BLD AUTO: 307 K/UL (ref 150–400)
PMV BLD AUTO: 10.8 FL (ref 8.9–12.9)
POTASSIUM SERPL-SCNC: 3.4 MMOL/L (ref 3.5–5.1)
PROT SERPL-MCNC: 7 G/DL (ref 6.4–8.2)
RBC # BLD AUTO: 4.83 M/UL (ref 4.1–5.7)
SERVICE CMNT-IMP: ABNORMAL
SERVICE CMNT-IMP: NORMAL
SERVICE CMNT-IMP: NORMAL
SODIUM SERPL-SCNC: 136 MMOL/L (ref 136–145)
WBC # BLD AUTO: 11.9 K/UL (ref 4.1–11.1)

## 2024-02-20 PROCEDURE — 6360000002 HC RX W HCPCS: Performed by: HOSPITALIST

## 2024-02-20 PROCEDURE — 6370000000 HC RX 637 (ALT 250 FOR IP): Performed by: CLINICAL NURSE SPECIALIST

## 2024-02-20 PROCEDURE — 6370000000 HC RX 637 (ALT 250 FOR IP): Performed by: STUDENT IN AN ORGANIZED HEALTH CARE EDUCATION/TRAINING PROGRAM

## 2024-02-20 PROCEDURE — 97530 THERAPEUTIC ACTIVITIES: CPT

## 2024-02-20 PROCEDURE — 70450 CT HEAD/BRAIN W/O DYE: CPT

## 2024-02-20 PROCEDURE — 92526 ORAL FUNCTION THERAPY: CPT

## 2024-02-20 PROCEDURE — 2580000003 HC RX 258: Performed by: HOSPITALIST

## 2024-02-20 PROCEDURE — 36415 COLL VENOUS BLD VENIPUNCTURE: CPT

## 2024-02-20 PROCEDURE — 99231 SBSQ HOSP IP/OBS SF/LOW 25: CPT | Performed by: CLINICAL NURSE SPECIALIST

## 2024-02-20 PROCEDURE — 97535 SELF CARE MNGMENT TRAINING: CPT

## 2024-02-20 PROCEDURE — 85025 COMPLETE CBC W/AUTO DIFF WBC: CPT

## 2024-02-20 PROCEDURE — 80053 COMPREHEN METABOLIC PANEL: CPT

## 2024-02-20 PROCEDURE — 82962 GLUCOSE BLOOD TEST: CPT

## 2024-02-20 PROCEDURE — 6370000000 HC RX 637 (ALT 250 FOR IP): Performed by: HOSPITALIST

## 2024-02-20 PROCEDURE — 2060000000 HC ICU INTERMEDIATE R&B

## 2024-02-20 PROCEDURE — A4216 STERILE WATER/SALINE, 10 ML: HCPCS | Performed by: HOSPITALIST

## 2024-02-20 PROCEDURE — C9113 INJ PANTOPRAZOLE SODIUM, VIA: HCPCS | Performed by: HOSPITALIST

## 2024-02-20 PROCEDURE — 2580000003 HC RX 258: Performed by: STUDENT IN AN ORGANIZED HEALTH CARE EDUCATION/TRAINING PROGRAM

## 2024-02-20 RX ORDER — DEXTROSE AND SODIUM CHLORIDE 5; .45 G/100ML; G/100ML
INJECTION, SOLUTION INTRAVENOUS CONTINUOUS
Status: DISCONTINUED | OUTPATIENT
Start: 2024-02-20 | End: 2024-02-20

## 2024-02-20 RX ORDER — QUETIAPINE FUMARATE 25 MG/1
25 TABLET, FILM COATED ORAL NIGHTLY PRN
Status: DISCONTINUED | OUTPATIENT
Start: 2024-02-20 | End: 2024-02-20

## 2024-02-20 RX ORDER — LANOLIN ALCOHOL/MO/W.PET/CERES
6 CREAM (GRAM) TOPICAL
Status: DISCONTINUED | OUTPATIENT
Start: 2024-02-20 | End: 2024-02-27 | Stop reason: HOSPADM

## 2024-02-20 RX ADMIN — INSULIN LISPRO 4 UNITS: 100 INJECTION, SOLUTION INTRAVENOUS; SUBCUTANEOUS at 17:50

## 2024-02-20 RX ADMIN — SODIUM CHLORIDE, PRESERVATIVE FREE 10 ML: 5 INJECTION INTRAVENOUS at 23:20

## 2024-02-20 RX ADMIN — SACUBITRIL AND VALSARTAN 1 TABLET: 24; 26 TABLET, FILM COATED ORAL at 23:17

## 2024-02-20 RX ADMIN — SODIUM CHLORIDE, PRESERVATIVE FREE 40 MG: 5 INJECTION INTRAVENOUS at 13:04

## 2024-02-20 RX ADMIN — SODIUM CHLORIDE, PRESERVATIVE FREE 40 MG: 5 INJECTION INTRAVENOUS at 02:17

## 2024-02-20 RX ADMIN — ATORVASTATIN CALCIUM 80 MG: 40 TABLET, FILM COATED ORAL at 23:19

## 2024-02-20 RX ADMIN — Medication 6 MG: at 23:18

## 2024-02-20 RX ADMIN — INSULIN HUMAN 24 UNITS: 100 INJECTION, SUSPENSION SUBCUTANEOUS at 10:13

## 2024-02-20 RX ADMIN — APIXABAN 5 MG: 5 TABLET, FILM COATED ORAL at 23:19

## 2024-02-20 RX ADMIN — CARVEDILOL 6.25 MG: 12.5 TABLET, FILM COATED ORAL at 17:33

## 2024-02-20 RX ADMIN — DEXTROSE AND SODIUM CHLORIDE: 5; 450 INJECTION, SOLUTION INTRAVENOUS at 12:56

## 2024-02-20 RX ADMIN — SODIUM CHLORIDE, PRESERVATIVE FREE 5 ML: 5 INJECTION INTRAVENOUS at 12:15

## 2024-02-20 ASSESSMENT — PAIN SCALES - WONG BAKER
WONGBAKER_NUMERICALRESPONSE: 0

## 2024-02-21 PROBLEM — R53.81 DEBILITY: Status: ACTIVE | Noted: 2024-02-21

## 2024-02-21 PROBLEM — Z71.89 DNR (DO NOT RESUSCITATE) DISCUSSION: Status: ACTIVE | Noted: 2024-02-21

## 2024-02-21 LAB
ALBUMIN SERPL-MCNC: 2.7 G/DL (ref 3.5–5)
ALBUMIN/GLOB SERPL: 0.6 (ref 1.1–2.2)
ALP SERPL-CCNC: 90 U/L (ref 45–117)
ALT SERPL-CCNC: 103 U/L (ref 12–78)
ANION GAP SERPL CALC-SCNC: 6 MMOL/L (ref 5–15)
AST SERPL-CCNC: 70 U/L (ref 15–37)
BASOPHILS # BLD: 0.1 K/UL (ref 0–0.1)
BASOPHILS NFR BLD: 0 % (ref 0–1)
BILIRUB SERPL-MCNC: 0.9 MG/DL (ref 0.2–1)
BUN SERPL-MCNC: 17 MG/DL (ref 6–20)
BUN/CREAT SERPL: 22 (ref 12–20)
CALCIUM SERPL-MCNC: 9.2 MG/DL (ref 8.5–10.1)
CHLORIDE SERPL-SCNC: 106 MMOL/L (ref 97–108)
CO2 SERPL-SCNC: 24 MMOL/L (ref 21–32)
CREAT SERPL-MCNC: 0.79 MG/DL (ref 0.7–1.3)
DIFFERENTIAL METHOD BLD: ABNORMAL
EOSINOPHIL # BLD: 0.2 K/UL (ref 0–0.4)
EOSINOPHIL NFR BLD: 1 % (ref 0–7)
ERYTHROCYTE [DISTWIDTH] IN BLOOD BY AUTOMATED COUNT: 13.3 % (ref 11.5–14.5)
GLOBULIN SER CALC-MCNC: 4.5 G/DL (ref 2–4)
GLUCOSE BLD STRIP.AUTO-MCNC: 105 MG/DL (ref 65–117)
GLUCOSE BLD STRIP.AUTO-MCNC: 115 MG/DL (ref 65–117)
GLUCOSE BLD STRIP.AUTO-MCNC: 119 MG/DL (ref 65–117)
GLUCOSE BLD STRIP.AUTO-MCNC: 147 MG/DL (ref 65–117)
GLUCOSE BLD STRIP.AUTO-MCNC: 97 MG/DL (ref 65–117)
GLUCOSE SERPL-MCNC: 96 MG/DL (ref 65–100)
HCT VFR BLD AUTO: 43.6 % (ref 36.6–50.3)
HGB BLD-MCNC: 14.7 G/DL (ref 12.1–17)
IMM GRANULOCYTES # BLD AUTO: 0.2 K/UL (ref 0–0.04)
IMM GRANULOCYTES NFR BLD AUTO: 1 % (ref 0–0.5)
LYMPHOCYTES # BLD: 2.4 K/UL (ref 0.8–3.5)
LYMPHOCYTES NFR BLD: 18 % (ref 12–49)
MCH RBC QN AUTO: 29.5 PG (ref 26–34)
MCHC RBC AUTO-ENTMCNC: 33.7 G/DL (ref 30–36.5)
MCV RBC AUTO: 87.6 FL (ref 80–99)
MONOCYTES # BLD: 1.1 K/UL (ref 0–1)
MONOCYTES NFR BLD: 8 % (ref 5–13)
NEUTS SEG # BLD: 9.6 K/UL (ref 1.8–8)
NEUTS SEG NFR BLD: 72 % (ref 32–75)
NRBC # BLD: 0 K/UL (ref 0–0.01)
NRBC BLD-RTO: 0 PER 100 WBC
PLATELET # BLD AUTO: 331 K/UL (ref 150–400)
PMV BLD AUTO: 10.9 FL (ref 8.9–12.9)
POTASSIUM SERPL-SCNC: 4 MMOL/L (ref 3.5–5.1)
PROT SERPL-MCNC: 7.2 G/DL (ref 6.4–8.2)
RBC # BLD AUTO: 4.98 M/UL (ref 4.1–5.7)
SERVICE CMNT-IMP: ABNORMAL
SERVICE CMNT-IMP: ABNORMAL
SERVICE CMNT-IMP: NORMAL
SODIUM SERPL-SCNC: 136 MMOL/L (ref 136–145)
WBC # BLD AUTO: 13.5 K/UL (ref 4.1–11.1)

## 2024-02-21 PROCEDURE — 97530 THERAPEUTIC ACTIVITIES: CPT

## 2024-02-21 PROCEDURE — 82962 GLUCOSE BLOOD TEST: CPT

## 2024-02-21 PROCEDURE — 6370000000 HC RX 637 (ALT 250 FOR IP): Performed by: HOSPITALIST

## 2024-02-21 PROCEDURE — 36415 COLL VENOUS BLD VENIPUNCTURE: CPT

## 2024-02-21 PROCEDURE — 6370000000 HC RX 637 (ALT 250 FOR IP): Performed by: STUDENT IN AN ORGANIZED HEALTH CARE EDUCATION/TRAINING PROGRAM

## 2024-02-21 PROCEDURE — 2060000000 HC ICU INTERMEDIATE R&B

## 2024-02-21 PROCEDURE — 85025 COMPLETE CBC W/AUTO DIFF WBC: CPT

## 2024-02-21 PROCEDURE — 80053 COMPREHEN METABOLIC PANEL: CPT

## 2024-02-21 PROCEDURE — 6360000002 HC RX W HCPCS: Performed by: HOSPITALIST

## 2024-02-21 PROCEDURE — 2580000003 HC RX 258: Performed by: HOSPITALIST

## 2024-02-21 PROCEDURE — 6370000000 HC RX 637 (ALT 250 FOR IP): Performed by: CLINICAL NURSE SPECIALIST

## 2024-02-21 PROCEDURE — 97112 NEUROMUSCULAR REEDUCATION: CPT

## 2024-02-21 PROCEDURE — 97535 SELF CARE MNGMENT TRAINING: CPT

## 2024-02-21 PROCEDURE — 99497 ADVNCD CARE PLAN 30 MIN: CPT | Performed by: NURSE PRACTITIONER

## 2024-02-21 PROCEDURE — C9113 INJ PANTOPRAZOLE SODIUM, VIA: HCPCS | Performed by: HOSPITALIST

## 2024-02-21 PROCEDURE — A4216 STERILE WATER/SALINE, 10 ML: HCPCS | Performed by: HOSPITALIST

## 2024-02-21 PROCEDURE — 6370000000 HC RX 637 (ALT 250 FOR IP): Performed by: INTERNAL MEDICINE

## 2024-02-21 PROCEDURE — 2580000003 HC RX 258: Performed by: STUDENT IN AN ORGANIZED HEALTH CARE EDUCATION/TRAINING PROGRAM

## 2024-02-21 PROCEDURE — 99232 SBSQ HOSP IP/OBS MODERATE 35: CPT | Performed by: NURSE PRACTITIONER

## 2024-02-21 RX ADMIN — SODIUM CHLORIDE, PRESERVATIVE FREE 10 ML: 5 INJECTION INTRAVENOUS at 21:34

## 2024-02-21 RX ADMIN — EMPAGLIFLOZIN 10 MG: 10 TABLET, FILM COATED ORAL at 09:52

## 2024-02-21 RX ADMIN — ATORVASTATIN CALCIUM 80 MG: 40 TABLET, FILM COATED ORAL at 21:33

## 2024-02-21 RX ADMIN — APIXABAN 5 MG: 5 TABLET, FILM COATED ORAL at 21:33

## 2024-02-21 RX ADMIN — SODIUM CHLORIDE, PRESERVATIVE FREE 40 MG: 5 INJECTION INTRAVENOUS at 04:12

## 2024-02-21 RX ADMIN — CLOPIDOGREL BISULFATE 75 MG: 75 TABLET, FILM COATED ORAL at 09:52

## 2024-02-21 RX ADMIN — CARVEDILOL 6.25 MG: 12.5 TABLET, FILM COATED ORAL at 09:53

## 2024-02-21 RX ADMIN — INSULIN HUMAN 24 UNITS: 100 INJECTION, SUSPENSION SUBCUTANEOUS at 10:11

## 2024-02-21 RX ADMIN — Medication 6 MG: at 21:33

## 2024-02-21 RX ADMIN — CARVEDILOL 6.25 MG: 12.5 TABLET, FILM COATED ORAL at 18:00

## 2024-02-21 RX ADMIN — SACUBITRIL AND VALSARTAN 1 TABLET: 24; 26 TABLET, FILM COATED ORAL at 09:52

## 2024-02-21 RX ADMIN — SODIUM CHLORIDE, PRESERVATIVE FREE 10 ML: 5 INJECTION INTRAVENOUS at 10:20

## 2024-02-21 RX ADMIN — SODIUM CHLORIDE, PRESERVATIVE FREE 40 MG: 5 INJECTION INTRAVENOUS at 13:01

## 2024-02-21 RX ADMIN — APIXABAN 5 MG: 5 TABLET, FILM COATED ORAL at 09:52

## 2024-02-21 RX ADMIN — INSULIN HUMAN 24 UNITS: 100 INJECTION, SUSPENSION SUBCUTANEOUS at 21:33

## 2024-02-21 RX ADMIN — SPIRONOLACTONE 25 MG: 25 TABLET ORAL at 09:53

## 2024-02-21 RX ADMIN — SACUBITRIL AND VALSARTAN 1 TABLET: 24; 26 TABLET, FILM COATED ORAL at 21:33

## 2024-02-21 ASSESSMENT — PAIN SCALES - WONG BAKER
WONGBAKER_NUMERICALRESPONSE: 0
WONGBAKER_NUMERICALRESPONSE: 0
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
## 2024-02-21 NOTE — CONSULTS
NeuroInterventional Surgery Consult  FRANSISCO Lopez NP    Patient: Ronald Caro MRN: [id number]  SSN: [id number]    YOB: 1970  Age: 53 y.o.  Sex: adult      Chief Complaint: Weakness, slurred speech    HPI:   53 y.o. Black /   adult w/ unknown pmh.  He presented to [facility] ED on 2/8/24 via EMS who found the pt in the 's seat of a parked car - they were called by the pt's friend/passenger who was also in the care and provided little information to EMS.  Per their report, pt developed acute weakness, confusion, and slurred speech at approx 1240; LKWT immediately prior.  Person on scene also reported that the pt may have had prior stroke.  He arrived as level 1, VAN + code stroke. CTH showed no acute process such as hemorrhage, appearance of possible encephalomalacia in right frontal lobe. CTP showed perfusion mismatch in left tempoparietal region. CTAh-n revealed distal left MCA occlusion. NIHSS 18.  D/t unknown history, pt was not given TNK. Dr. Toure notified and agreed to take pt for emergent mechanical thrombectomy.  Emergent consent as no family or friends were available and no contacts listed. Vitals upon admission: /93, Sp02 98%, Temp 97.3F, HR 92, RR 15. Notable labs include Na 120, Crt 1.87, K 5.5, glucose >1000, Lactic 2.61, UDS + cocaine.  NIS has been consulted for evaluation in setting of acute ischemic stroke.      No past medical history on file.  No family history on file.  Social History     Tobacco Use    Smoking status: Not on file    Smokeless tobacco: Not on file   Substance Use Topics    Alcohol use: Not on file      Current Facility-Administered Medications   Medication Dose Route Frequency Provider Last Rate Last Admin    iopamidol (ISOVUE-370) 76 % injection 100 mL  100 mL IntraVENous ONCE PRN Della Purcell MD        glucose chewable tablet 16 g  4 tablet Oral PRN Lemuel Murphy MD        dextrose bolus 10% 125 mL  125 mL 
Consult Note            Date:2/12/2024        Patient Name:Ronald Caro     YOB: 1970     Age:53 y.o.    Consults Physical Medicine and Rehabilitation    Chief Complaint     Chief Complaint   Patient presents with    Altered Mental Status          History Obtained From   patient, electronic medical record    History of Present Illness   Mr. Caro is a 53-year-old male with a history of type 2 diabetes, hypertension, cocaine use, prior right frontal lobe stroke in July 2023, CAD status post PCI to the LAD in July 2023 due to anterior STEMI, heart failure with reduced ejection fraction of 25 to 30% who presented on 2/8/2024 with acute weakness on the right side, confusion and slurred speech while sitting in the  seat of a parked car.  EMS activated by patient's friend and passenger who was also in the car.  He was VAN positive code stroke.  CT of the head showed no acute process, appearance of possible encephalomalacia in the right frontal lobe.  CT perfusion study showed perfusion mismatch in the left temporoparietal region.  CTA of the head and neck revealed a distal left MCA occlusion.  NIH stroke scale of 18.  Given initial unknown history he was not given TNK. Dr. Toure was notified and agreed to take patient for emergent mechanical thrombectomy on admission.  UDS was positive for cocaine admission.  Patient underwent cerebral angiogram and emergent mechanical thrombectomy to the left M2 branch on 2/8/2024.  He was admitted to the ICU status post intervention.  He has been cleared by speech for puréed diet with thin liquids.  He continues to have expressive aphasia.  Patient has had agitation and repeat CT head performed which shows no acute change.  Cardiology has been consulted and notes patient with left LV thrombus.  In the setting of recent thrombectomy recommend continue to hold clopidogrel but will need to start in the future.    Physical medicine rehabilitation has been consulted 
Consult Note            Date:2/15/2024        Patient Name:Ronald Caro     YOB: 1970     Age:53 y.o.    Consults Physical Medicine and Rehabilitation    Chief Complaint     Chief Complaint   Patient presents with    Altered Mental Status          History Obtained From   patient, nursing staff    History of Present Illness   Patient originally seen on 2/12/2024 and reconsulted on 2/14/2024.  See below for prior history:    Mr. Caro is a 53-year-old male with a history of type 2 diabetes, hypertension, cocaine use, prior right frontal lobe stroke in July 2023, CAD status post PCI to the LAD in July 2023 due to anterior STEMI, heart failure with reduced ejection fraction of 25 to 30% who presented on 2/8/2024 with acute weakness on the right side, confusion and slurred speech while sitting in the  seat of a parked car.  EMS activated by patient's friend and passenger who was also in the car.  He was VAN positive code stroke.  CT of the head showed no acute process, appearance of possible encephalomalacia in the right frontal lobe.  CT perfusion study showed perfusion mismatch in the left temporoparietal region.  CTA of the head and neck revealed a distal left MCA occlusion.  NIH stroke scale of 18.  Given initial unknown history he was not given TNK. Dr. Toure was notified and agreed to take patient for emergent mechanical thrombectomy on admission.  UDS was positive for cocaine admission.  Patient underwent cerebral angiogram and emergent mechanical thrombectomy to the left M2 branch on 2/8/2024.  He was admitted to the ICU status post intervention.  He has been cleared by speech for puréed diet with thin liquids.  He continues to have expressive aphasia.  Patient has had agitation and repeat CT head performed which shows no acute change.  Cardiology has been consulted and notes patient with left LV thrombus.  In the setting of recent thrombectomy recommend continue to hold clopidogrel but 
Department of Psychiatry  Attending Consult Note        Reason for Consult:  confusion, delirium  Requesting Physician:  Becky    IDENTIFYING DATA:          The patient is a 53 y.o. adult with a medical history of No past medical history on file.   who presents with confusion, delirium s/p occlusion MCA and thrombectomy.    HISTORY OF PRESENT ILLNESS:  54 yo male lying in bed, fixed gaze to his left. He will turn head if provider is on right does not cross midline. Per RN in room, pt had NG placed after failing swallow test and is now awaiting tube feeds. Did not get AM meds yet. He does not follow commands and is nonverbal. Per RN he is quite strong, and is in 2 point soft restraints--this provider removed restrain to assess for cogwheeling and replaced restraint without incident. He is disorganized, placing L leg over bed rail. Tried to get him out of bed yesterday but was unsteady and could not follow commands.    Current Facility-Administered Medications: midazolam PF (VERSED) injection 1 mg, 1 mg, IntraVENous, Once  carvedilol (COREG) tablet 6.25 mg, 6.25 mg, Oral, BID WC  empagliflozin (JARDIANCE) tablet 10 mg, 10 mg, Oral, Daily  insulin lispro (HUMALOG) injection vial 8 Units, 8 Units, SubCUTAneous, TID WC  spironolactone (ALDACTONE) tablet 25 mg, 25 mg, Oral, Daily  insulin lispro (HUMALOG) injection vial 0-8 Units, 0-8 Units, SubCUTAneous, TID WC  insulin lispro (HUMALOG) injection vial 0-4 Units, 0-4 Units, SubCUTAneous, Nightly  insulin glargine (LANTUS) injection vial 15 Units, 15 Units, SubCUTAneous, Daily  heparin 25,000 units in dextrose 5% 250 mL (premix) infusion, 5-30 Units/kg/hr, IntraVENous, Continuous  iopamidol (ISOVUE-370) 76 % injection 100 mL, 100 mL, IntraVENous, ONCE PRN  glucose chewable tablet 16 g, 4 tablet, Oral, PRN  dextrose bolus 10% 125 mL, 125 mL, IntraVENous, PRN **OR** dextrose bolus 10% 250 mL, 250 mL, IntraVENous, PRN  glucagon injection 1 mg, 1 mg, SubCUTAneous, PRN  dextrose 
Neurology Staff Addendum:  I have personally seen and examined the patient. I have personally reviewed the chart and images. Elements of my examination included history of present illness, review of systems, review of past medical and surgical history, review of medications, and physical and neurological examination. I have personally reviewed the findings and impressions with the nurse practitioner and am in agreement with their note with changes below.    Pt is a 52yo male with h/o DM, diabetic PN, HTN, HLD, CVA, CAD with h/o MI, smoker, UDS+Cocaine, home medication status unknown, admitted 2/8/24 with acute aphasia and right sided weakness. Pt is unable to provide any history. According to ED notes, he was driving with a passenger when he suddenly seemed confused, not moving right side, left gaze pref, EMS was called. CTH with right frontal hypoattenuation.  CTA H/N left TP CVA, severe stenosis or occlusion of left M3/M4. CTP with mismatch in left TP lobes. S/p thrombectomy. Glu 1001, Na 120, BUN/Cr 40/1.87. HgbA1C 13.7. LDL 99.  TTE with EF 10-15%, severe hypokinesis and akinesis, large apical thrombus.      BP (!) 144/96   Pulse 95   Temp 97.5 °F (36.4 °C) (Oral)   Resp 15   Ht 1.955 m (6' 4.97\")   Wt 105.2 kg (231 lb 14.8 oz)   SpO2 100%   BMI 27.52 kg/m²     Physical Exam:  General: Well developed well nourished patient in no apparent distress.   Cardiac: Regular rhythm, tachycardic, with no murmurs.   Carotids: 2+ symmetric, no bruits  Extremities: 2+ Radial pulses, no cyanosis or edema    Neurological Exam:  Mental Status: Alert, interactive, says hello, no other intelligible words, no commands   Cranial Nerves:   PERRL, left gaze pref,  EOMI, no nystagmus, no ptosis. Facial movement is symmetric.  Unable to visualize palate   Motor:  Moving left U/LE and right LE spontaneously and purposefully. No movement or w/d in right UE   Reflexes:   Deep tendon reflexes 2+ and symmetric. Toes downgoing. 
Patient seen again on the evening of 2/20/2024 with family present and nurse administering medications in  [place].  Patient continues to be nonverbal with strong right-sided neglect and left gaze preference.  He does make some grunting gestures and seems to respond best to familiar faces including family and friends.  He did not follow any of my commands and therefore did not participate in physical exam however did demonstrate active ability to have full range of motion in his left upper extremity and left lower extremity.  Patient likely with apraxia of speech as well as expressive and receptive aphasia, neglect, inattention and significant cognitive impairment.  His right upper and lower extremities are flaccid.  I do recommend acute inpatient rehabilitation close patient in order for him to have the best possibility for meaningful recovery and neurologic improvement and he would greatly benefit from all 3 disciplines to include physical therapy, Occupational Therapy and speech therapy.  He also does need specialty nursing care and physician oversight for monitoring for signs and symptoms of aspiration in the setting of dysphagia, titration of antihypertensives, monitoring renal function in the setting of a modified diet.    Thank you for this consultation  Please feel free to contact me directly with any questions or concerns.  Prabha Haywood, DO  [phone]    
rate and rhythm, normal S1 and S2, no murmurs, rubs or gallops,  Lungs: no respiratory distress, lungs clear to auscultation anteriorly  Abdomen: soft, non distended  MSK: no lower extremity edema  Skin: warm to touch  Neuro: unable to follow commands    Lab Review:  CBC:  Recent Labs     02/09/24  0537   WBC 18.5*   RBC 5.80*   HGB 17.2*   HCT 49.2   MCV 84.8   MCH 29.7   MCHC 35.0   RDW 13.5      MPV 11.9       CMP:    Recent Labs     02/08/24  1347 02/08/24  1401 02/09/24  1207   *   < > 136   K 5.5*   < > 3.9   CL 87*   < > 102   CO2 25   < > 28   BUN 40*   < > 28*   CREATININE 1.87*   < > 1.23   GLUCOSE 1,001*   < > 180*   CALCIUM 9.3   < > 9.2   PROT 7.6  --   --    LABALBU 3.5  --   --    BILITOT 0.8  --   --    ALKPHOS 179*  --   --    AST 27  --   --    ALT 37  --   --     < > = values in this interval not displayed.           All Cardiac Markers in the last 24 hours:     Latest Reference Range & Units 02/08/24 13:47   Troponin, High Sensitivity 0 - 76 ng/L 71         Data Review:  EKG independently reviewed: NSR with anterolateral and inferior infarct. Mild inferior and anterior ST elevations.     Echocardiogram:   2/8/2024 review personally and agree with below    Left Ventricle: Severely reduced left ventricular systolic function with a visually estimated EF of 10 -15%. Severe hypokinesis of the following segments: mid anterior, mid anterolateral, mid anteroseptal, mid inferior, mid inferolateral and mid inferoseptal. Akinesis of the following segments: apical anterior, apical lateral, apical septal and apical inferior. Akinesis of the apex. There is a 38 x 15 mm large mass present that is fixed, echogenic and mural in the apex consistent with thrombus.    Echo 7/12/23  1. Procedure narrative: Transthoracic echocardiography. Image     quality was adequate. The study was technically limited due to     body habitus. Intravenous contrast (Definity) was administered     to opacify the LV.  2. 
distress)  Nausea Score:  (Patient non verbal, not following commands, no signor symptoms of distress)  Appetite Score:  (Patient non verbal, not following commands, no signor symptoms of distress)  Dyspnea Score:  (Patient non verbal, not following commands, no signor symptoms of distress)  Wellbeing Score:  (Patient non verbal, not following commands, no signor symptoms of distress)  Other Problem Score:  (Patient non verbal, not following commands, no signor symptoms of distress)    Clinical Pain Assessment (nonverbal scale for severity on nonverbal patients):   Clinical Pain Assessment  Location: Patient non verbal, not following commands, no signor symptoms of distress  Character: Patient non verbal, not following commands, no signor symptoms of distress  Duration: Patient non verbal, not following commands, no signor symptoms of distress  Factors: Patient non verbal, not following commands, no signor symptoms of distress  Frequency: Patient non verbal, not following commands, no signor symptoms of distress       NVPS:  Adult Nonverbal Pain Scale (NVPS)  Face: No particular expression or smile  Activity (Movement): Laid quietly, normal position  Guarding: Lying quietly, no positioning of hands over areas of bod  Physiology (Vital Signs): Stable vital signs  Respiratory: Baseline RR/SpO2 compliant with ventilator  NVPS Score : 0    RDOS:  RDOS  Heart rate per minute: less than 90  Respiratory rate per minute: less than 19  Restlessness:non-purposeful: Occasional, slight movements  Accessory muscle use: rise in clavicle during inspiration: None  Grunting at end-expiration: guttural sound: None  Nasal flaring: involuntary movement of nares: None  Look of fear: None  Total : 1      Vital Signs: Blood pressure 133/87, pulse (!) 115, temperature 97.5 °F (36.4 °C), temperature source Axillary, resp. rate 15, height 1.955 m (6' 4.97\"), weight 103.7 kg (228 lb 9.9 oz), SpO2 94 %.    PHYSICAL ASSESSMENT:   General: []

## 2024-02-21 NOTE — ACP (ADVANCE CARE PLANNING)
Advance Care Planning     Advance Care Planning (ACP) Physician/NP/PA Conversation    Date of Conversation: 2/21/2024  Conducted with:  Healthcare Decision Maker: Next of Kin by law (only applies in absence of above) (name) Brother:  Henry Caro  (he included the patient's mother in the decision about code status)    Healthcare Decision Maker:    Primary Decision Maker (Active): Henry Caro - Brother/Sister - [phone]  Click here to complete Healthcare Decision Makers including selection of the Healthcare Decision Maker Relationship (ie \"Primary\").     Care Preferences:    Hospitalization:  \"If your health worsens and it becomes clear that your chance of recovery is unlikely, what would be your preference regarding hospitalization?\"  The patient would prefer hospitalization.    Ventilation:  \"If you were unable to breath on your own and your chance of recovery was unlikely, what would be your preference about the use of a ventilator (breathing machine) if it was available to you?\"  The patient would desire the use of a ventilator.    Resuscitation:  \"In the event your heart stopped as a result of an underlying serious health condition, would you want attempts made to restart your heart, or would you prefer a natural death?\"  Yes, attempt to resuscitate.    treatment goals, benefit/burden of treatment options, and artificial nutrition    Conversation Outcomes / Follow-Up Plan:  Patient is unable to complete at this time  Reviewed DNR/DNI and patient elects Full Code (Attempt Resuscitation)    Length of Voluntary ACP Conversation in minutes:  25 minutes    FRANSISCO Ayers - NP

## 2024-02-22 LAB
ALBUMIN SERPL-MCNC: 2.9 G/DL (ref 3.5–5)
ALBUMIN/GLOB SERPL: 0.7 (ref 1.1–2.2)
ALP SERPL-CCNC: 94 U/L (ref 45–117)
ALT SERPL-CCNC: 88 U/L (ref 12–78)
ANION GAP SERPL CALC-SCNC: 8 MMOL/L (ref 5–15)
AST SERPL-CCNC: 47 U/L (ref 15–37)
BASOPHILS # BLD: 0.1 K/UL (ref 0–0.1)
BASOPHILS NFR BLD: 1 % (ref 0–1)
BILIRUB SERPL-MCNC: 0.9 MG/DL (ref 0.2–1)
BUN SERPL-MCNC: 20 MG/DL (ref 6–20)
BUN/CREAT SERPL: 24 (ref 12–20)
CALCIUM SERPL-MCNC: 9.3 MG/DL (ref 8.5–10.1)
CHLORIDE SERPL-SCNC: 106 MMOL/L (ref 97–108)
CO2 SERPL-SCNC: 24 MMOL/L (ref 21–32)
CREAT SERPL-MCNC: 0.84 MG/DL (ref 0.7–1.3)
DIFFERENTIAL METHOD BLD: ABNORMAL
EOSINOPHIL # BLD: 0.1 K/UL (ref 0–0.4)
EOSINOPHIL NFR BLD: 1 % (ref 0–7)
ERYTHROCYTE [DISTWIDTH] IN BLOOD BY AUTOMATED COUNT: 14 % (ref 11.5–14.5)
GLOBULIN SER CALC-MCNC: 4 G/DL (ref 2–4)
GLUCOSE BLD STRIP.AUTO-MCNC: 121 MG/DL (ref 65–117)
GLUCOSE BLD STRIP.AUTO-MCNC: 152 MG/DL (ref 65–117)
GLUCOSE BLD STRIP.AUTO-MCNC: 86 MG/DL (ref 65–117)
GLUCOSE BLD STRIP.AUTO-MCNC: 88 MG/DL (ref 65–117)
GLUCOSE SERPL-MCNC: 76 MG/DL (ref 65–100)
HCT VFR BLD AUTO: 48.5 % (ref 36.6–50.3)
HGB BLD-MCNC: 15.6 G/DL (ref 12.1–17)
IMM GRANULOCYTES # BLD AUTO: 0 K/UL
IMM GRANULOCYTES NFR BLD AUTO: 0 %
LYMPHOCYTES # BLD: 1.9 K/UL (ref 0.8–3.5)
LYMPHOCYTES NFR BLD: 17 % (ref 12–49)
MCH RBC QN AUTO: 29.6 PG (ref 26–34)
MCHC RBC AUTO-ENTMCNC: 32.2 G/DL (ref 30–36.5)
MCV RBC AUTO: 92 FL (ref 80–99)
MONOCYTES # BLD: 0.7 K/UL (ref 0–1)
MONOCYTES NFR BLD: 6 % (ref 5–13)
MYELOCYTES NFR BLD MANUAL: 1 %
NEUTS SEG # BLD: 8.3 K/UL (ref 1.8–8)
NEUTS SEG NFR BLD: 74 % (ref 32–75)
NRBC # BLD: 0 K/UL (ref 0–0.01)
NRBC BLD-RTO: 0 PER 100 WBC
PLATELET # BLD AUTO: 297 K/UL (ref 150–400)
PMV BLD AUTO: 10.5 FL (ref 8.9–12.9)
POTASSIUM SERPL-SCNC: 4.2 MMOL/L (ref 3.5–5.1)
PROT SERPL-MCNC: 6.9 G/DL (ref 6.4–8.2)
RBC # BLD AUTO: 5.27 M/UL (ref 4.1–5.7)
RBC MORPH BLD: ABNORMAL
SERVICE CMNT-IMP: ABNORMAL
SERVICE CMNT-IMP: ABNORMAL
SERVICE CMNT-IMP: NORMAL
SERVICE CMNT-IMP: NORMAL
SODIUM SERPL-SCNC: 138 MMOL/L (ref 136–145)
WBC # BLD AUTO: 11.2 K/UL (ref 4.1–11.1)

## 2024-02-22 PROCEDURE — C9113 INJ PANTOPRAZOLE SODIUM, VIA: HCPCS | Performed by: HOSPITALIST

## 2024-02-22 PROCEDURE — 82962 GLUCOSE BLOOD TEST: CPT

## 2024-02-22 PROCEDURE — 6370000000 HC RX 637 (ALT 250 FOR IP): Performed by: STUDENT IN AN ORGANIZED HEALTH CARE EDUCATION/TRAINING PROGRAM

## 2024-02-22 PROCEDURE — 99231 SBSQ HOSP IP/OBS SF/LOW 25: CPT | Performed by: CLINICAL NURSE SPECIALIST

## 2024-02-22 PROCEDURE — 2060000000 HC ICU INTERMEDIATE R&B

## 2024-02-22 PROCEDURE — 6370000000 HC RX 637 (ALT 250 FOR IP): Performed by: INTERNAL MEDICINE

## 2024-02-22 PROCEDURE — 6370000000 HC RX 637 (ALT 250 FOR IP): Performed by: CLINICAL NURSE SPECIALIST

## 2024-02-22 PROCEDURE — 36415 COLL VENOUS BLD VENIPUNCTURE: CPT

## 2024-02-22 PROCEDURE — 6370000000 HC RX 637 (ALT 250 FOR IP): Performed by: HOSPITALIST

## 2024-02-22 PROCEDURE — 2580000003 HC RX 258: Performed by: HOSPITALIST

## 2024-02-22 PROCEDURE — A4216 STERILE WATER/SALINE, 10 ML: HCPCS | Performed by: HOSPITALIST

## 2024-02-22 PROCEDURE — 85025 COMPLETE CBC W/AUTO DIFF WBC: CPT

## 2024-02-22 PROCEDURE — 97530 THERAPEUTIC ACTIVITIES: CPT

## 2024-02-22 PROCEDURE — 6360000002 HC RX W HCPCS: Performed by: HOSPITALIST

## 2024-02-22 PROCEDURE — 97112 NEUROMUSCULAR REEDUCATION: CPT | Performed by: OCCUPATIONAL THERAPIST

## 2024-02-22 PROCEDURE — 80053 COMPREHEN METABOLIC PANEL: CPT

## 2024-02-22 RX ADMIN — Medication 6 MG: at 21:49

## 2024-02-22 RX ADMIN — SPIRONOLACTONE 25 MG: 25 TABLET ORAL at 09:20

## 2024-02-22 RX ADMIN — CLOPIDOGREL BISULFATE 75 MG: 75 TABLET, FILM COATED ORAL at 09:19

## 2024-02-22 RX ADMIN — EMPAGLIFLOZIN 10 MG: 10 TABLET, FILM COATED ORAL at 09:19

## 2024-02-22 RX ADMIN — APIXABAN 5 MG: 5 TABLET, FILM COATED ORAL at 21:05

## 2024-02-22 RX ADMIN — INSULIN HUMAN 20 UNITS: 100 INJECTION, SUSPENSION SUBCUTANEOUS at 10:06

## 2024-02-22 RX ADMIN — APIXABAN 5 MG: 5 TABLET, FILM COATED ORAL at 09:19

## 2024-02-22 RX ADMIN — INSULIN HUMAN 20 UNITS: 100 INJECTION, SUSPENSION SUBCUTANEOUS at 21:51

## 2024-02-22 RX ADMIN — SODIUM CHLORIDE, PRESERVATIVE FREE 40 MG: 5 INJECTION INTRAVENOUS at 12:41

## 2024-02-22 RX ADMIN — CARVEDILOL 6.25 MG: 12.5 TABLET, FILM COATED ORAL at 09:20

## 2024-02-22 RX ADMIN — SACUBITRIL AND VALSARTAN 1 TABLET: 24; 26 TABLET, FILM COATED ORAL at 21:49

## 2024-02-22 RX ADMIN — SACUBITRIL AND VALSARTAN 1 TABLET: 24; 26 TABLET, FILM COATED ORAL at 09:19

## 2024-02-22 RX ADMIN — ATORVASTATIN CALCIUM 80 MG: 40 TABLET, FILM COATED ORAL at 21:48

## 2024-02-22 RX ADMIN — CARVEDILOL 6.25 MG: 12.5 TABLET, FILM COATED ORAL at 16:50

## 2024-02-22 ASSESSMENT — PAIN SCALES - WONG BAKER: WONGBAKER_NUMERICALRESPONSE: 0

## 2024-02-23 LAB
ALBUMIN SERPL-MCNC: 2.8 G/DL (ref 3.5–5)
ALBUMIN/GLOB SERPL: 0.6 (ref 1.1–2.2)
ALP SERPL-CCNC: 92 U/L (ref 45–117)
ALT SERPL-CCNC: 71 U/L (ref 12–78)
ANION GAP SERPL CALC-SCNC: 8 MMOL/L (ref 5–15)
AST SERPL-CCNC: 34 U/L (ref 15–37)
BASOPHILS # BLD: 0.1 K/UL (ref 0–0.1)
BASOPHILS NFR BLD: 0 % (ref 0–1)
BILIRUB SERPL-MCNC: 0.7 MG/DL (ref 0.2–1)
BUN SERPL-MCNC: 22 MG/DL (ref 6–20)
BUN/CREAT SERPL: 23 (ref 12–20)
CALCIUM SERPL-MCNC: 9.7 MG/DL (ref 8.5–10.1)
CHLORIDE SERPL-SCNC: 107 MMOL/L (ref 97–108)
CO2 SERPL-SCNC: 25 MMOL/L (ref 21–32)
CREAT SERPL-MCNC: 0.94 MG/DL (ref 0.7–1.3)
DIFFERENTIAL METHOD BLD: ABNORMAL
EOSINOPHIL # BLD: 0.2 K/UL (ref 0–0.4)
EOSINOPHIL NFR BLD: 1 % (ref 0–7)
ERYTHROCYTE [DISTWIDTH] IN BLOOD BY AUTOMATED COUNT: 13.6 % (ref 11.5–14.5)
GLOBULIN SER CALC-MCNC: 4.5 G/DL (ref 2–4)
GLUCOSE BLD STRIP.AUTO-MCNC: 123 MG/DL (ref 65–117)
GLUCOSE BLD STRIP.AUTO-MCNC: 139 MG/DL (ref 65–117)
GLUCOSE BLD STRIP.AUTO-MCNC: 179 MG/DL (ref 65–117)
GLUCOSE BLD STRIP.AUTO-MCNC: 95 MG/DL (ref 65–117)
GLUCOSE SERPL-MCNC: 60 MG/DL (ref 65–100)
HCT VFR BLD AUTO: 42.9 % (ref 36.6–50.3)
HGB BLD-MCNC: 14.3 G/DL (ref 12.1–17)
IMM GRANULOCYTES # BLD AUTO: 0.1 K/UL (ref 0–0.04)
IMM GRANULOCYTES NFR BLD AUTO: 1 % (ref 0–0.5)
LYMPHOCYTES # BLD: 2.8 K/UL (ref 0.8–3.5)
LYMPHOCYTES NFR BLD: 22 % (ref 12–49)
MCH RBC QN AUTO: 29.1 PG (ref 26–34)
MCHC RBC AUTO-ENTMCNC: 33.3 G/DL (ref 30–36.5)
MCV RBC AUTO: 87.4 FL (ref 80–99)
MONOCYTES # BLD: 1 K/UL (ref 0–1)
MONOCYTES NFR BLD: 8 % (ref 5–13)
NEUTS SEG # BLD: 8.4 K/UL (ref 1.8–8)
NEUTS SEG NFR BLD: 67 % (ref 32–75)
NRBC # BLD: 0 K/UL (ref 0–0.01)
NRBC BLD-RTO: 0 PER 100 WBC
PLATELET # BLD AUTO: 330 K/UL (ref 150–400)
PMV BLD AUTO: 11 FL (ref 8.9–12.9)
POTASSIUM SERPL-SCNC: 3.7 MMOL/L (ref 3.5–5.1)
PROT SERPL-MCNC: 7.3 G/DL (ref 6.4–8.2)
RBC # BLD AUTO: 4.91 M/UL (ref 4.1–5.7)
SERVICE CMNT-IMP: ABNORMAL
SERVICE CMNT-IMP: NORMAL
SODIUM SERPL-SCNC: 140 MMOL/L (ref 136–145)
WBC # BLD AUTO: 12.5 K/UL (ref 4.1–11.1)

## 2024-02-23 PROCEDURE — 99231 SBSQ HOSP IP/OBS SF/LOW 25: CPT | Performed by: CLINICAL NURSE SPECIALIST

## 2024-02-23 PROCEDURE — 6370000000 HC RX 637 (ALT 250 FOR IP): Performed by: STUDENT IN AN ORGANIZED HEALTH CARE EDUCATION/TRAINING PROGRAM

## 2024-02-23 PROCEDURE — 97535 SELF CARE MNGMENT TRAINING: CPT | Performed by: OCCUPATIONAL THERAPIST

## 2024-02-23 PROCEDURE — 2060000000 HC ICU INTERMEDIATE R&B

## 2024-02-23 PROCEDURE — C9113 INJ PANTOPRAZOLE SODIUM, VIA: HCPCS | Performed by: HOSPITALIST

## 2024-02-23 PROCEDURE — 97112 NEUROMUSCULAR REEDUCATION: CPT

## 2024-02-23 PROCEDURE — 80053 COMPREHEN METABOLIC PANEL: CPT

## 2024-02-23 PROCEDURE — 6370000000 HC RX 637 (ALT 250 FOR IP): Performed by: INTERNAL MEDICINE

## 2024-02-23 PROCEDURE — A4216 STERILE WATER/SALINE, 10 ML: HCPCS | Performed by: HOSPITALIST

## 2024-02-23 PROCEDURE — 97112 NEUROMUSCULAR REEDUCATION: CPT | Performed by: OCCUPATIONAL THERAPIST

## 2024-02-23 PROCEDURE — 6370000000 HC RX 637 (ALT 250 FOR IP): Performed by: HOSPITALIST

## 2024-02-23 PROCEDURE — 6360000002 HC RX W HCPCS: Performed by: HOSPITALIST

## 2024-02-23 PROCEDURE — 94761 N-INVAS EAR/PLS OXIMETRY MLT: CPT

## 2024-02-23 PROCEDURE — 97530 THERAPEUTIC ACTIVITIES: CPT | Performed by: OCCUPATIONAL THERAPIST

## 2024-02-23 PROCEDURE — 85025 COMPLETE CBC W/AUTO DIFF WBC: CPT

## 2024-02-23 PROCEDURE — 2580000003 HC RX 258: Performed by: STUDENT IN AN ORGANIZED HEALTH CARE EDUCATION/TRAINING PROGRAM

## 2024-02-23 PROCEDURE — 2580000003 HC RX 258: Performed by: HOSPITALIST

## 2024-02-23 PROCEDURE — 36415 COLL VENOUS BLD VENIPUNCTURE: CPT

## 2024-02-23 PROCEDURE — 82962 GLUCOSE BLOOD TEST: CPT

## 2024-02-23 PROCEDURE — 97530 THERAPEUTIC ACTIVITIES: CPT

## 2024-02-23 RX ORDER — PANTOPRAZOLE SODIUM 40 MG/1
40 TABLET, DELAYED RELEASE ORAL
Status: DISCONTINUED | OUTPATIENT
Start: 2024-02-23 | End: 2024-02-27 | Stop reason: HOSPADM

## 2024-02-23 RX ADMIN — ATORVASTATIN CALCIUM 80 MG: 40 TABLET, FILM COATED ORAL at 20:56

## 2024-02-23 RX ADMIN — DIAZEPAM 2 MG: 5 INJECTION, SOLUTION INTRAMUSCULAR; INTRAVENOUS at 23:12

## 2024-02-23 RX ADMIN — SODIUM CHLORIDE, PRESERVATIVE FREE 10 ML: 5 INJECTION INTRAVENOUS at 10:08

## 2024-02-23 RX ADMIN — PANTOPRAZOLE SODIUM 40 MG: 40 TABLET, DELAYED RELEASE ORAL at 17:02

## 2024-02-23 RX ADMIN — SODIUM CHLORIDE, PRESERVATIVE FREE 40 MG: 5 INJECTION INTRAVENOUS at 01:20

## 2024-02-23 RX ADMIN — CARVEDILOL 6.25 MG: 12.5 TABLET, FILM COATED ORAL at 10:07

## 2024-02-23 RX ADMIN — Medication 6 MG: at 20:55

## 2024-02-23 RX ADMIN — CLOPIDOGREL BISULFATE 75 MG: 75 TABLET, FILM COATED ORAL at 10:07

## 2024-02-23 RX ADMIN — APIXABAN 5 MG: 5 TABLET, FILM COATED ORAL at 10:07

## 2024-02-23 RX ADMIN — APIXABAN 5 MG: 5 TABLET, FILM COATED ORAL at 20:56

## 2024-02-23 RX ADMIN — SACUBITRIL AND VALSARTAN 1 TABLET: 24; 26 TABLET, FILM COATED ORAL at 10:08

## 2024-02-23 RX ADMIN — SPIRONOLACTONE 25 MG: 25 TABLET ORAL at 10:08

## 2024-02-23 RX ADMIN — EMPAGLIFLOZIN 10 MG: 10 TABLET, FILM COATED ORAL at 10:07

## 2024-02-23 RX ADMIN — CARVEDILOL 6.25 MG: 12.5 TABLET, FILM COATED ORAL at 17:03

## 2024-02-23 RX ADMIN — SACUBITRIL AND VALSARTAN 1 TABLET: 24; 26 TABLET, FILM COATED ORAL at 20:55

## 2024-02-23 ASSESSMENT — PAIN SCALES - WONG BAKER
WONGBAKER_NUMERICALRESPONSE: 0
WONGBAKER_NUMERICALRESPONSE: 0

## 2024-02-23 NOTE — PALLIATIVE CARE DISCHARGE
Goals of Care/Treatment Preferences    The Palliative Medicine team was consulted as part of your/your loved one's care in the hospital. Our team is a supportive service; we strive to relieve suffering and improve quality of life.    We reviewed advance care planning information, which includes the following:    Primary Decision Maker (Active): Henry Caro - Brother/Sister - [phone]  Patient's Healthcare Decision Maker is:: Legal Next of Kin  Confirm Advance Directive: None         We reviewed / discussed your code status as:   Code Status: Full Code     “Full Code” means perform CPR in the event of cardiac arrest.      “DNR” means do NOT perform CPR in the event of cardiac arrest.      “Partial Code” means you have specific preferences, please discuss with your healthcare team.      “No Order” means this issue was not addressed / resolved during your stay            Because of the importance of this information, we are providing you with a printed copy to share with other healthcare providers after this hospitalization is complete.

## 2024-02-24 LAB
GLUCOSE BLD STRIP.AUTO-MCNC: 157 MG/DL (ref 65–117)
GLUCOSE BLD STRIP.AUTO-MCNC: 186 MG/DL (ref 65–117)
GLUCOSE BLD STRIP.AUTO-MCNC: 208 MG/DL (ref 65–117)
GLUCOSE BLD STRIP.AUTO-MCNC: 252 MG/DL (ref 65–117)
SERVICE CMNT-IMP: ABNORMAL

## 2024-02-24 PROCEDURE — 2060000000 HC ICU INTERMEDIATE R&B

## 2024-02-24 PROCEDURE — 6370000000 HC RX 637 (ALT 250 FOR IP): Performed by: INTERNAL MEDICINE

## 2024-02-24 PROCEDURE — 6370000000 HC RX 637 (ALT 250 FOR IP): Performed by: STUDENT IN AN ORGANIZED HEALTH CARE EDUCATION/TRAINING PROGRAM

## 2024-02-24 PROCEDURE — 6370000000 HC RX 637 (ALT 250 FOR IP): Performed by: HOSPITALIST

## 2024-02-24 PROCEDURE — 82962 GLUCOSE BLOOD TEST: CPT

## 2024-02-24 PROCEDURE — 94761 N-INVAS EAR/PLS OXIMETRY MLT: CPT

## 2024-02-24 PROCEDURE — 2580000003 HC RX 258: Performed by: STUDENT IN AN ORGANIZED HEALTH CARE EDUCATION/TRAINING PROGRAM

## 2024-02-24 RX ADMIN — POLYETHYLENE GLYCOL 3350 17 G: 17 POWDER, FOR SOLUTION ORAL at 22:53

## 2024-02-24 RX ADMIN — APIXABAN 5 MG: 5 TABLET, FILM COATED ORAL at 09:42

## 2024-02-24 RX ADMIN — INSULIN LISPRO 2 UNITS: 100 INJECTION, SOLUTION INTRAVENOUS; SUBCUTANEOUS at 16:44

## 2024-02-24 RX ADMIN — PANTOPRAZOLE SODIUM 40 MG: 40 TABLET, DELAYED RELEASE ORAL at 06:57

## 2024-02-24 RX ADMIN — Medication 6 MG: at 22:53

## 2024-02-24 RX ADMIN — EMPAGLIFLOZIN 10 MG: 10 TABLET, FILM COATED ORAL at 09:43

## 2024-02-24 RX ADMIN — INSULIN LISPRO 4 UNITS: 100 INJECTION, SOLUTION INTRAVENOUS; SUBCUTANEOUS at 09:44

## 2024-02-24 RX ADMIN — SODIUM CHLORIDE, PRESERVATIVE FREE 10 ML: 5 INJECTION INTRAVENOUS at 09:44

## 2024-02-24 RX ADMIN — ATORVASTATIN CALCIUM 80 MG: 40 TABLET, FILM COATED ORAL at 22:53

## 2024-02-24 RX ADMIN — CLOPIDOGREL BISULFATE 75 MG: 75 TABLET, FILM COATED ORAL at 09:43

## 2024-02-24 RX ADMIN — APIXABAN 5 MG: 5 TABLET, FILM COATED ORAL at 22:53

## 2024-02-24 RX ADMIN — CARVEDILOL 6.25 MG: 12.5 TABLET, FILM COATED ORAL at 16:46

## 2024-02-24 RX ADMIN — SODIUM CHLORIDE, PRESERVATIVE FREE 10 ML: 5 INJECTION INTRAVENOUS at 22:54

## 2024-02-24 ASSESSMENT — PAIN SCALES - WONG BAKER
WONGBAKER_NUMERICALRESPONSE: 0

## 2024-02-25 LAB
GLUCOSE BLD STRIP.AUTO-MCNC: 141 MG/DL (ref 65–117)
GLUCOSE BLD STRIP.AUTO-MCNC: 155 MG/DL (ref 65–117)
GLUCOSE BLD STRIP.AUTO-MCNC: 171 MG/DL (ref 65–117)
GLUCOSE BLD STRIP.AUTO-MCNC: 226 MG/DL (ref 65–117)
SERVICE CMNT-IMP: ABNORMAL

## 2024-02-25 PROCEDURE — 6370000000 HC RX 637 (ALT 250 FOR IP): Performed by: STUDENT IN AN ORGANIZED HEALTH CARE EDUCATION/TRAINING PROGRAM

## 2024-02-25 PROCEDURE — 94761 N-INVAS EAR/PLS OXIMETRY MLT: CPT

## 2024-02-25 PROCEDURE — 6370000000 HC RX 637 (ALT 250 FOR IP): Performed by: HOSPITALIST

## 2024-02-25 PROCEDURE — 2060000000 HC ICU INTERMEDIATE R&B

## 2024-02-25 PROCEDURE — 82962 GLUCOSE BLOOD TEST: CPT

## 2024-02-25 RX ORDER — INSULIN GLARGINE 100 [IU]/ML
8 INJECTION, SOLUTION SUBCUTANEOUS NIGHTLY
Status: DISCONTINUED | OUTPATIENT
Start: 2024-02-25 | End: 2024-02-26

## 2024-02-25 RX ORDER — CARVEDILOL 3.12 MG/1
3.12 TABLET ORAL 2 TIMES DAILY WITH MEALS
Status: DISCONTINUED | OUTPATIENT
Start: 2024-02-25 | End: 2024-02-27 | Stop reason: HOSPADM

## 2024-02-25 RX ADMIN — APIXABAN 5 MG: 5 TABLET, FILM COATED ORAL at 22:24

## 2024-02-25 RX ADMIN — INSULIN LISPRO 2 UNITS: 100 INJECTION, SOLUTION INTRAVENOUS; SUBCUTANEOUS at 11:53

## 2024-02-25 RX ADMIN — PANTOPRAZOLE SODIUM 40 MG: 40 TABLET, DELAYED RELEASE ORAL at 07:13

## 2024-02-25 RX ADMIN — APIXABAN 5 MG: 5 TABLET, FILM COATED ORAL at 09:20

## 2024-02-25 RX ADMIN — CLOPIDOGREL BISULFATE 75 MG: 75 TABLET, FILM COATED ORAL at 09:22

## 2024-02-25 RX ADMIN — SACUBITRIL AND VALSARTAN 1 TABLET: 24; 26 TABLET, FILM COATED ORAL at 09:21

## 2024-02-25 RX ADMIN — INSULIN GLARGINE 8 UNITS: 100 INJECTION, SOLUTION SUBCUTANEOUS at 22:30

## 2024-02-25 RX ADMIN — Medication 6 MG: at 22:24

## 2024-02-25 RX ADMIN — ATORVASTATIN CALCIUM 80 MG: 40 TABLET, FILM COATED ORAL at 22:25

## 2024-02-25 RX ADMIN — EMPAGLIFLOZIN 10 MG: 10 TABLET, FILM COATED ORAL at 09:20

## 2024-02-25 ASSESSMENT — PAIN SCALES - GENERAL
PAINLEVEL_OUTOF10: 0

## 2024-02-26 LAB
GLUCOSE BLD STRIP.AUTO-MCNC: 234 MG/DL (ref 65–117)
GLUCOSE BLD STRIP.AUTO-MCNC: 252 MG/DL (ref 65–117)
GLUCOSE BLD STRIP.AUTO-MCNC: 304 MG/DL (ref 65–117)
GLUCOSE BLD STRIP.AUTO-MCNC: 370 MG/DL (ref 65–117)
SERVICE CMNT-IMP: ABNORMAL

## 2024-02-26 PROCEDURE — 97530 THERAPEUTIC ACTIVITIES: CPT

## 2024-02-26 PROCEDURE — 92526 ORAL FUNCTION THERAPY: CPT

## 2024-02-26 PROCEDURE — 92507 TX SP LANG VOICE COMM INDIV: CPT

## 2024-02-26 PROCEDURE — 6370000000 HC RX 637 (ALT 250 FOR IP): Performed by: HOSPITALIST

## 2024-02-26 PROCEDURE — 6360000002 HC RX W HCPCS: Performed by: HOSPITALIST

## 2024-02-26 PROCEDURE — 97535 SELF CARE MNGMENT TRAINING: CPT

## 2024-02-26 PROCEDURE — 99232 SBSQ HOSP IP/OBS MODERATE 35: CPT | Performed by: CLINICAL NURSE SPECIALIST

## 2024-02-26 PROCEDURE — 6370000000 HC RX 637 (ALT 250 FOR IP): Performed by: STUDENT IN AN ORGANIZED HEALTH CARE EDUCATION/TRAINING PROGRAM

## 2024-02-26 PROCEDURE — 2060000000 HC ICU INTERMEDIATE R&B

## 2024-02-26 PROCEDURE — 82962 GLUCOSE BLOOD TEST: CPT

## 2024-02-26 PROCEDURE — 6370000000 HC RX 637 (ALT 250 FOR IP): Performed by: INTERNAL MEDICINE

## 2024-02-26 PROCEDURE — 6370000000 HC RX 637 (ALT 250 FOR IP): Performed by: CLINICAL NURSE SPECIALIST

## 2024-02-26 RX ORDER — INSULIN GLARGINE 100 [IU]/ML
15 INJECTION, SOLUTION SUBCUTANEOUS NIGHTLY
Status: DISCONTINUED | OUTPATIENT
Start: 2024-02-26 | End: 2024-02-27

## 2024-02-26 RX ADMIN — APIXABAN 5 MG: 5 TABLET, FILM COATED ORAL at 21:27

## 2024-02-26 RX ADMIN — APIXABAN 5 MG: 5 TABLET, FILM COATED ORAL at 09:06

## 2024-02-26 RX ADMIN — INSULIN LISPRO 8 UNITS: 100 INJECTION, SOLUTION INTRAVENOUS; SUBCUTANEOUS at 06:41

## 2024-02-26 RX ADMIN — SACUBITRIL AND VALSARTAN 1 TABLET: 24; 26 TABLET, FILM COATED ORAL at 09:06

## 2024-02-26 RX ADMIN — Medication 6 MG: at 21:26

## 2024-02-26 RX ADMIN — ATORVASTATIN CALCIUM 80 MG: 40 TABLET, FILM COATED ORAL at 21:26

## 2024-02-26 RX ADMIN — PANTOPRAZOLE SODIUM 40 MG: 40 TABLET, DELAYED RELEASE ORAL at 06:37

## 2024-02-26 RX ADMIN — INSULIN LISPRO 2 UNITS: 100 INJECTION, SOLUTION INTRAVENOUS; SUBCUTANEOUS at 21:25

## 2024-02-26 RX ADMIN — EMPAGLIFLOZIN 10 MG: 10 TABLET, FILM COATED ORAL at 09:06

## 2024-02-26 RX ADMIN — DIAZEPAM 2 MG: 5 INJECTION, SOLUTION INTRAMUSCULAR; INTRAVENOUS at 13:00

## 2024-02-26 RX ADMIN — SPIRONOLACTONE 25 MG: 25 TABLET ORAL at 09:06

## 2024-02-26 RX ADMIN — INSULIN GLARGINE 15 UNITS: 100 INJECTION, SOLUTION SUBCUTANEOUS at 21:25

## 2024-02-26 RX ADMIN — INSULIN LISPRO 2 UNITS: 100 INJECTION, SOLUTION INTRAVENOUS; SUBCUTANEOUS at 18:46

## 2024-02-26 RX ADMIN — SACUBITRIL AND VALSARTAN 1 TABLET: 24; 26 TABLET, FILM COATED ORAL at 21:26

## 2024-02-26 RX ADMIN — CLOPIDOGREL BISULFATE 75 MG: 75 TABLET, FILM COATED ORAL at 09:06

## 2024-02-26 RX ADMIN — CARVEDILOL 3.12 MG: 3.12 TABLET, FILM COATED ORAL at 09:06

## 2024-02-26 RX ADMIN — CARVEDILOL 3.12 MG: 3.12 TABLET, FILM COATED ORAL at 18:46

## 2024-02-27 VITALS
WEIGHT: 228.62 LBS | HEIGHT: 77 IN | RESPIRATION RATE: 14 BRPM | HEART RATE: 93 BPM | TEMPERATURE: 98 F | OXYGEN SATURATION: 99 % | BODY MASS INDEX: 26.99 KG/M2 | SYSTOLIC BLOOD PRESSURE: 103 MMHG | DIASTOLIC BLOOD PRESSURE: 79 MMHG

## 2024-02-27 LAB
ANION GAP SERPL CALC-SCNC: 9 MMOL/L (ref 5–15)
BASOPHILS # BLD: 0.1 K/UL (ref 0–0.1)
BASOPHILS NFR BLD: 1 % (ref 0–1)
BUN SERPL-MCNC: 20 MG/DL (ref 6–20)
BUN/CREAT SERPL: 23 (ref 12–20)
CALCIUM SERPL-MCNC: 9.1 MG/DL (ref 8.5–10.1)
CHLORIDE SERPL-SCNC: 105 MMOL/L (ref 97–108)
CO2 SERPL-SCNC: 25 MMOL/L (ref 21–32)
CREAT SERPL-MCNC: 0.88 MG/DL (ref 0.7–1.3)
DIFFERENTIAL METHOD BLD: NORMAL
EOSINOPHIL # BLD: 0.3 K/UL (ref 0–0.4)
EOSINOPHIL NFR BLD: 3 % (ref 0–7)
ERYTHROCYTE [DISTWIDTH] IN BLOOD BY AUTOMATED COUNT: 13.4 % (ref 11.5–14.5)
GLUCOSE BLD STRIP.AUTO-MCNC: 171 MG/DL (ref 65–117)
GLUCOSE BLD STRIP.AUTO-MCNC: 176 MG/DL (ref 65–117)
GLUCOSE BLD STRIP.AUTO-MCNC: 294 MG/DL (ref 65–117)
GLUCOSE SERPL-MCNC: 175 MG/DL (ref 65–100)
HCT VFR BLD AUTO: 41.1 % (ref 36.6–50.3)
HGB BLD-MCNC: 14.2 G/DL (ref 12.1–17)
IMM GRANULOCYTES # BLD AUTO: 0 K/UL (ref 0–0.04)
IMM GRANULOCYTES NFR BLD AUTO: 0 % (ref 0–0.5)
LYMPHOCYTES # BLD: 2.2 K/UL (ref 0.8–3.5)
LYMPHOCYTES NFR BLD: 25 % (ref 12–49)
MCH RBC QN AUTO: 29.7 PG (ref 26–34)
MCHC RBC AUTO-ENTMCNC: 34.5 G/DL (ref 30–36.5)
MCV RBC AUTO: 86 FL (ref 80–99)
MONOCYTES # BLD: 0.7 K/UL (ref 0–1)
MONOCYTES NFR BLD: 8 % (ref 5–13)
NEUTS SEG # BLD: 5.5 K/UL (ref 1.8–8)
NEUTS SEG NFR BLD: 63 % (ref 32–75)
NRBC # BLD: 0 K/UL (ref 0–0.01)
NRBC BLD-RTO: 0 PER 100 WBC
PLATELET # BLD AUTO: 277 K/UL (ref 150–400)
PMV BLD AUTO: 10.7 FL (ref 8.9–12.9)
POTASSIUM SERPL-SCNC: 4.1 MMOL/L (ref 3.5–5.1)
RBC # BLD AUTO: 4.78 M/UL (ref 4.1–5.7)
SARS-COV-2 RDRP RESP QL NAA+PROBE: NOT DETECTED
SERVICE CMNT-IMP: ABNORMAL
SODIUM SERPL-SCNC: 139 MMOL/L (ref 136–145)
SOURCE: NORMAL
UFH PPP CHRO-ACNC: 0.59 IU/ML
WBC # BLD AUTO: 8.8 K/UL (ref 4.1–11.1)

## 2024-02-27 PROCEDURE — 6370000000 HC RX 637 (ALT 250 FOR IP): Performed by: HOSPITALIST

## 2024-02-27 PROCEDURE — 85520 HEPARIN ASSAY: CPT

## 2024-02-27 PROCEDURE — 6370000000 HC RX 637 (ALT 250 FOR IP): Performed by: STUDENT IN AN ORGANIZED HEALTH CARE EDUCATION/TRAINING PROGRAM

## 2024-02-27 PROCEDURE — 36415 COLL VENOUS BLD VENIPUNCTURE: CPT

## 2024-02-27 PROCEDURE — 82962 GLUCOSE BLOOD TEST: CPT

## 2024-02-27 PROCEDURE — 99231 SBSQ HOSP IP/OBS SF/LOW 25: CPT | Performed by: CLINICAL NURSE SPECIALIST

## 2024-02-27 PROCEDURE — 87635 SARS-COV-2 COVID-19 AMP PRB: CPT

## 2024-02-27 PROCEDURE — 6370000000 HC RX 637 (ALT 250 FOR IP): Performed by: INTERNAL MEDICINE

## 2024-02-27 PROCEDURE — 85025 COMPLETE CBC W/AUTO DIFF WBC: CPT

## 2024-02-27 PROCEDURE — 80048 BASIC METABOLIC PNL TOTAL CA: CPT

## 2024-02-27 RX ORDER — PANTOPRAZOLE SODIUM 40 MG/1
40 TABLET, DELAYED RELEASE ORAL
Qty: 30 TABLET | Refills: 3 | Status: SHIPPED | OUTPATIENT
Start: 2024-02-28

## 2024-02-27 RX ORDER — CARVEDILOL 3.12 MG/1
3.12 TABLET ORAL 2 TIMES DAILY WITH MEALS
Qty: 60 TABLET | Refills: 3 | Status: SHIPPED | OUTPATIENT
Start: 2024-02-27

## 2024-02-27 RX ORDER — CLOPIDOGREL BISULFATE 75 MG/1
75 TABLET ORAL DAILY
Qty: 30 TABLET | Refills: 3 | Status: SHIPPED | OUTPATIENT
Start: 2024-02-28

## 2024-02-27 RX ORDER — LANOLIN ALCOHOL/MO/W.PET/CERES
6 CREAM (GRAM) TOPICAL
Qty: 30 TABLET | Refills: 0 | Status: SHIPPED | OUTPATIENT
Start: 2024-02-27

## 2024-02-27 RX ORDER — SPIRONOLACTONE 25 MG/1
25 TABLET ORAL DAILY
Qty: 30 TABLET | Refills: 3 | Status: SHIPPED | OUTPATIENT
Start: 2024-02-28

## 2024-02-27 RX ORDER — ATORVASTATIN CALCIUM 80 MG/1
80 TABLET, FILM COATED ORAL NIGHTLY
Qty: 30 TABLET | Refills: 3 | Status: SHIPPED | OUTPATIENT
Start: 2024-02-27

## 2024-02-27 RX ORDER — INSULIN GLARGINE 100 [IU]/ML
18 INJECTION, SOLUTION SUBCUTANEOUS NIGHTLY
Status: DISCONTINUED | OUTPATIENT
Start: 2024-02-27 | End: 2024-02-27 | Stop reason: HOSPADM

## 2024-02-27 RX ORDER — INSULIN GLARGINE 100 [IU]/ML
18 INJECTION, SOLUTION SUBCUTANEOUS NIGHTLY
Qty: 10 ML | Refills: 3 | Status: SHIPPED | OUTPATIENT
Start: 2024-02-27

## 2024-02-27 RX ORDER — POLYETHYLENE GLYCOL 3350 17 G/17G
17 POWDER, FOR SOLUTION ORAL DAILY PRN
Qty: 30 G | Refills: 0 | Status: SHIPPED | OUTPATIENT
Start: 2024-02-27 | End: 2024-03-28

## 2024-02-27 RX ADMIN — PANTOPRAZOLE SODIUM 40 MG: 40 TABLET, DELAYED RELEASE ORAL at 07:44

## 2024-02-27 RX ADMIN — SACUBITRIL AND VALSARTAN 1 TABLET: 24; 26 TABLET, FILM COATED ORAL at 09:06

## 2024-02-27 RX ADMIN — APIXABAN 5 MG: 5 TABLET, FILM COATED ORAL at 09:06

## 2024-02-27 RX ADMIN — CLOPIDOGREL BISULFATE 75 MG: 75 TABLET, FILM COATED ORAL at 09:06

## 2024-02-27 RX ADMIN — EMPAGLIFLOZIN 10 MG: 10 TABLET, FILM COATED ORAL at 09:06

## 2024-02-27 RX ADMIN — CARVEDILOL 3.12 MG: 3.12 TABLET, FILM COATED ORAL at 17:23

## 2024-02-27 RX ADMIN — SPIRONOLACTONE 25 MG: 25 TABLET ORAL at 09:06

## 2024-02-27 RX ADMIN — CARVEDILOL 3.12 MG: 3.12 TABLET, FILM COATED ORAL at 09:06

## 2024-02-27 RX ADMIN — INSULIN LISPRO 4 UNITS: 100 INJECTION, SOLUTION INTRAVENOUS; SUBCUTANEOUS at 17:23

## 2024-02-27 NOTE — CARE COORDINATION
Care Management - Received call from Yudith with [facility] regarding who will be paying for transport, code status, and isolation precautions. Gave her the requested information. Updated cost for the trip is $802. They are on scene and will be upstairs soon.    Updated patient's RN.    JEFFREY RITCHIE'  
Care Management Initial Assessment       RUR: 14%  Readmission? No  Insurance: [place] [employer] ([id number])   Original Medicaid number [id number]  Disposition: TBD - await MRI results, await therapy evaluations  PCP: [facility]  Legal Next of Kin: Brother Henry Caro [phone]    Patient's first name is spelled incorrectly (missing the \"l\" ) and this chart will be merged with the correct chart upon discharge. Patient's name is Maycol Caro.     Patient to ED with stroke symptoms. 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    ICU RN and CM found patient's insurance information in his belongings. His [place] number is [id number]. His traditional Medicaid number is [id number].     Called patient's brother Henry Caro ([phone]). Brother's address is [address] [place] [address] ([facility]). The address on file is patient's mother's old address ([address] [place] [id number]). Brother said address could be changed to his address.    Prior to admission patient had just moved in with his girlfriend Esperanza Muse. Patient was independent with his ADLs and drove. Patient does not have any DME. He used [facility] and saw physicians at [facility]. Patient did not take his medications on a regular basis. Patient works as a  for family members, but does not have a consistent job.     Patient has had 2 other strokes, a major heart attack, and toe amputations. When he has left the hospital on previous admissions, he \"walked out\" and did not need any therapy.  He is supposed to be on either insulin or metformin.     Explained legal next of kin. Patient is . He does not have adult children. His mother has beginning dementia. His mother now lives with his brother Henry. Henry said that he and patient's mother will make the decisions together. At this time brother Henry Caro is patient's legal next of kin. 
Transition of Care Plan:    IPR - Referral sent to [facility]; will require insurance auth through [place] [employer]  - Physiatry consulted    Transport: Stretcher    RUR: 15%  Prior Level of Functioning: Independent  Disposition: IPR  If SNF or IPR: Date FOC offered: 2/14  Date FOC received: 2/14  Accepting facility: Pending  Date authorization started with reference number:   Date authorization received and expires:   Follow up appointments: PCP, Neuro   DME needed: defer to IPR  Transportation at discharge: [facility] - [facility]  Caregiver Contact: BrothHenry dorman [phone]   Discharge Caregiver contacted prior to discharge?   Care Conference needed? No  Barriers to discharge: Medical; NPO, needs PEG for DC. Placement/insurance auth required through [place] [employer]     PT/OT/SLP and Dr. Pena recommending IPR at discharge for Pt. CM spoke with Pt's brother, preference is for [facility] in [place]. Referral sent in [facility].     Physiatry consulted.     02/14/24 8680   Condition of Participation: Discharge Planning   The Plan for Transition of Care is related to the following treatment goals: IPR   The Patient and/or Patient Representative was provided with a Choice of Provider? Patient Representative   Name of the Patient Representative who was provided with the Choice of Provider and agrees with the Discharge Plan?  Henry Caro   The Patient and/Or Patient Representative agree with the Discharge Plan? Yes   Freedom of Choice list was provided with basic dialogue that supports the patient's individualized plan of care/goals, treatment preferences, and shares the quality data associated with the providers?  Yes       VANE Santillan (Ally).     
Transition of Care: TBD; likely needs rehab; (no referrals have been sent yet) pending medical progress and final recommendations) (patient will need insurance authorization- has medicaid)     NOTE: [place] lives [place]   NOTE: PMR consult placed to Dr. Haywood on 2/12/24    Transport Plan: likely stretcher or possibly WC van (not set up)     RUR: 15%    Main contact is brother- Henry Caro [phone]    Discharge pending:  -patient is a transfer from ICU to [address] [place] [address] on 2/11  -pending complex medical progress and care recommendations   -pending MRI  -pending placement and insurance auth  -patient continues on a heparin drip   -patient currently on restraints  -pending diet plan and diet advancement  -discharge likely greater than 48 hours    Prior Level of Functioning: lives independently with significant other in [place]; has a mother and brother; still works  Disposition: TBS; needs rehab  If SNF or IPR: Date FOC offered: not yet  Date FOC received: not yet  Accepting facility: not yet  Date authorization started with reference number: not yet  Date authorization received and expires: not yet  Follow up appointments: specialists/pcp  DME needed: none if going to rehab  Transportation at discharge: stretcher and or WC van  IM/IMM Medicare/ letter given: n/a  Is patient a  and connected with VA? no   If yes, was  transfer form completed and VA notified? N/a  Caregiver Contact: brother  Discharge Caregiver contacted prior to discharge? Not yet  Care Conference needed? no  Barriers to discharge:  medical progress, placement, insurance auth    CM following   Ruby Mendez RN    NOTE: Per previous CM note:  PCP: [facility]  Patient's first name is spelled incorrectly (missing the \"l\" ) and this chart will be merged with the correct chart upon discharge. Patient's name is Maycol Caro.   Patient to ED with stroke symptoms. Patient admitted with left MCA CVA and had a 
Transition of Care: recs are for IPR; pending referral sent to [facility] and [facility]   (patient will need insurance authorization- has medicaid-[facility]       NOTE: [place] lives in [place]   NOTE: PMR consult placed to Dr. Haywood on 2/12/24      Transport Plan: likely stretcher (not set up- patient has [place] medicaid)      RUR: 15%     Main contact is demetra Caro [phone]     Discharge pending:  -patient is a transfer from ICU to [address] [place] [address] on 2/11  -pending progress with PT/OT  -pending  medical progress   -pending placement and insurance auth   -pending  diet advancement     1015:  this CM called patients demetra Caro to get additional IPR choices; sent referrals to [facility] and [facility] via careport    1100: [facility] has declined    1630: this CM emailed updated clinicals to [facility] per their request     Prior Level of Functioning: lives independently with significant other in [place], [place]; has a mother and brother; still works  Disposition:  needs rehab-IPR  If SNF or IPR: Date FOC offered: 2/14 and on 2/19  Date FOC received: 2/14 and on 2/19  [facility]  Date authorization started with reference number: not yet started ( would be started by facility)   Date authorization received and expires: not yet  Follow up appointments: specialists/pcp  DME needed: none if going to rehab  Transportation at discharge: stretcher   IM/IMM Medicare/ letter given: n/a  Is patient a  and connected with VA? no              If yes, was [place] transfer form completed and VA notified? N/a  Caregiver Contact: brother  Discharge Caregiver contacted prior to discharge? Not yet  Care Conference needed? no  Barriers to discharge:  medical progress, placement, insurance auth     CM following   Ruby Mendez RN     NOTE: Per previous CM note:  PCP: Danica Hewitt 
Transition of Care: recs are for IPR; pending referral sent to [facility] in [place] (patient will need insurance authorization- has medicaid-[facility]    NOTE: contact at [facility] in [place] is Stan [phone]     NOTE: [place] lives in [place]   NOTE: PMR consult placed to Dr. Haywood on 2/12/24-- may need to be reconsulted     Transport Plan: likely stretcher (not set up)     RUR: 15%     Main contact is brother- Henry Caro [phone]     Discharge pending:  -patient is a transfer from ICU to [address] [place] [address] on 2/11  -pending complex medical progress and care recommendations   -pending placement and insurance auth  -patient continues on a heparin drip   -patient currently on restraints  -pending diet plan and diet advancement  -pending likely peg tube placement  -discharge likely greater than 48 hours     Prior Level of Functioning: lives independently with significant other in [place], [place]; has a mother and brother; still works  Disposition:  needs rehab-IPR  If SNF or IPR: Date FOC offered: 2/14  Date FOC received: 2/14  Accepting facility: pending  Date authorization started with reference number: not yet  Date authorization received and expires: not yet  Follow up appointments: specialists/pcp  DME needed: none if going to rehab  Transportation at discharge: irineo greer or LUCIANA Guerrero/IMM Medicare/[place] letter given: n/a  Is patient a [place] and connected with VA? no              If yes, was [place] transfer form completed and VA notified? N/a  Caregiver Contact: brother  Discharge Caregiver contacted prior to discharge? Not yet  Care Conference needed? no  Barriers to discharge:  medical progress, placement, insurance auth     CM following   Ruby Mendez RN     NOTE: Per previous CM note:  PCP: [facility]  Patient's first name is spelled incorrectly (missing the \"l\" ) and this chart will be merged with the correct chart upon discharge. Patient's name is Maycol 
Transition of Care:TBD      Plan A:  recs are for IPR;  [facility]; pending P2P with insurance (-medicaid-[facility])will be done on Tuesday 2/27    if P2P is approved [facility] will have a bed ready     P2P Info- [phone]  or [phone]  Auth ID # [id number] ; MD at [facility]- Dr. Enzo Baer--this P2P is tentatively scheduled for Tuesday 2/27        Vs      Plan B  SNF rehab: no referrals have been sent out yet (patients brother has the [place] medicaid in network list of SNFs for the [facility]); he is currently reviewing the list      NOTE: patients brother lives in [place], [place] patient has been living between his brothers [place] and [place]  NOTE: Patient has [place] [employer]- hospital coordinator is Jesica  [phone] ; community care coordinator is Angelika Choudhary [phone]  NOTE: PMR consult placed to Dr. Haywood on 2/12/24      Transport Plan: will need stretcher stretcher (not set up- patient has [place] [employer] medicaid)      RUR: 15%     Main contact is brother- Henry Caro [phone]     Discharge pending:  -patient is a transfer from ICU to [address] [place] [address] on 2/11  -pending progress with PT/OT  -pending P2P insurance auth- MD at [facility] has attempted P2P twice on 2/22 and 2/23 with patients [place]- unable to get through to the company after much time and multiple attempts      [phone]: this CM was informed by [employer] that she has attempted again multiple times today 2/23 to do the P2P with [place] [employer]; she has not been able to reach any MD at [employer]; this CM called [place] [employer]- [phone]- finally spoke to a representative in the medicaid authorization department and P2P scheduling- this CM gave them the [employer] contact information; they will call this CM on Monday 2/26 to schedule a P2P for Tuesday 2/27 (they do not do same day P2P); this CM updated both 
Transition of Care:TBD      Plan A:  recs are for IPR;  [facility]; pending [facility]P with insurance [facility])will be done on Tuesday 2/27 at 1:30pm    if P2P is approved [facility] will have a bed ready      P2P Info- [phone]  or [phone]  Auth ID # [id number] ; MD at [facility]- Dr. Enzo Baer--this P2P is scheduled for Tuesday 2/27 at 1:30pm with Dr. Toth (attending)         Vs      Plan B  SNF rehab: multiple pending referrals are being sent out to SNFs in the [facility] and the [facility] areas via [employer] and [employer]     NOTE: patients brother lives in [place], [place] patient has been living between his brothers [place] and [place]  NOTE: [facility]- hospital coordinator is Jesica  [phone] ; community care coordinator is Angelika Choudhary [phone]  NOTE: PMR consult placed to Dr. Haywood on 2/12/24      Transport Plan: will need stretcher stretcher (not set up- patient has [facility] medicaid)      RUR: 15%     Main contact is brother- Henry Caro [phone]     Discharge pending:  -patient is a transfer from ICU to [address] [place] [address] on 2/11  -pending progress with PT/OT/speech  -pending P2P insurance auth- on 2/27       1000: the P2P has been set up for 2/27 at 1330 with attending; [facility] and [facility]  to inform them    1300: this CM updated the patients brother about the P2P tomorrow and also got his SNF choices; he stated to send to any in network SNFs in the [facility] and [facility] area; this CM will send to al the participating [place] med[place] SNFs in those areas via [employer] and [employer]     Prior Level of Functioning: lives independently; per brother- he will likely go to his home in [place], [place] after rehab; has a mother and brother; still works  Disposition:  TBD: needs rehab-IPR  If SNF or IPR: Date FOC offered: 2/14 and on 2/19   Date FOC received: IPR 2/14 and on 2/19         
face sheet.             Revision History               Revision History       
          Revision History    
     Revision History
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
## 2024-02-27 NOTE — DISCHARGE SUMMARY
tablet  Commonly known as: ELIQUIS  Take 1 tablet by mouth 2 times daily     atorvastatin 80 MG tablet  Commonly known as: LIPITOR  Take 1 tablet by mouth nightly     carvedilol 3.125 MG tablet  Commonly known as: COREG  Take 1 tablet by mouth 2 times daily (with meals)     clopidogrel 75 MG tablet  Commonly known as: PLAVIX  Take 1 tablet by mouth daily  Start taking on: February 28, 2024     empagliflozin 10 MG tablet  Commonly known as: JARDIANCE  Take 1 tablet by mouth daily  Start taking on: February 28, 2024     insulin glargine 100 UNIT/ML injection vial  Commonly known as: LANTUS  Inject 18 Units into the skin nightly     melatonin 3 MG Tabs tablet  Take 2 tablets by mouth nightly     pantoprazole 40 MG tablet  Commonly known as: PROTONIX  Take 1 tablet by mouth every morning (before breakfast)  Start taking on: February 28, 2024     polyethylene glycol 17 g packet  Commonly known as: GLYCOLAX  Take 1 packet by mouth daily as needed for Constipation     sacubitril-valsartan 24-26 MG per tablet  Commonly known as: ENTRESTO  Take 1 tablet by mouth 2 times daily Hold for SBP<100               Where to Get Your Medications        Information about where to get these medications is not yet available    Ask your nurse or doctor about these medications  apixaban 5 MG Tabs tablet  atorvastatin 80 MG tablet  carvedilol 3.125 MG tablet  clopidogrel 75 MG tablet  empagliflozin 10 MG tablet  insulin glargine 100 UNIT/ML injection vial  melatonin 3 MG Tabs tablet  pantoprazole 40 MG tablet  polyethylene glycol 17 g packet  sacubitril-valsartan 24-26 MG per tablet           NOTIFY YOUR PHYSICIAN FOR ANY OF THE FOLLOWING:   Fever over 101 degrees for 24 hours.   Chest pain, shortness of breath, fever, chills, nausea, vomiting, diarrhea, change in mentation, falling, weakness, bleeding. Severe pain or pain not relieved by medications.  Or, any other signs or symptoms that you may have questions about.    DISPOSITION:

## 2024-02-27 NOTE — PLAN OF CARE
Problem: Physical Therapy - Adult  Goal: By Discharge: Performs mobility at highest level of function for planned discharge setting.  See evaluation for individualized goals.  Description: FUNCTIONAL STATUS PRIOR TO ADMISSION: Patient was independent and active without use of DME, presumedly.    HOME SUPPORT PRIOR TO ADMISSION: needs clarification    Physical Therapy Goals    Revised 2/162024  1.  Patient will move from supine to sit and sit to supine in bed with mod assistance within 7 day(s).    2.  Patient will perform sit to stand with max assistance within 7 day(s).  3.  Patient will transfer from bed to chair and chair to bed with max assistance using the least restrictive device within 7 day(s).  4. Patient will sit edge of bed with contact guard assist for approx 5 minutes within 7 days.  5.  Patient will improve Hwang Balance score by 7 points within 7 days  Initiated 2/9/2024  1.  Patient will move from supine to sit and sit to supine in bed with minimal assistance within 7 day(s).    2.  Patient will perform sit to stand with minimal assistance within 7 day(s).  3.  Patient will transfer from bed to chair and chair to bed with minimal assistance using the least restrictive device within 7 day(s).  4.  Patient will ambulate with moderate assistance for 50 feet with the least restrictive device within 7 day(s).   5.  Patient will improve Hwang Balance score by 7 points within 7 days.   2/16/2024 1221 by Jose Price, PT  Outcome: Not Progressing        PHYSICAL THERAPY TREATMENT: WEEKLY REASSESSMENT    Patient: Ronald Caro (53 y.o. adult)  Date: 2/16/2024  Primary Diagnosis: Cerebrovascular accident (CVA) due to occlusion of left middle cerebral artery (HCC) [I63.512]  Acute cerebrovascular accident (CVA) due to ischemia (HCC) [I63.9]       Precautions: Fall Risk                      ASSESSMENT :  Patient continues to benefit from skilled PT services and is not progressing towards goals. 
    Problem: Physical Therapy - Adult  Goal: By Discharge: Performs mobility at highest level of function for planned discharge setting.  See evaluation for individualized goals.  Description: FUNCTIONAL STATUS PRIOR TO ADMISSION: Patient was independent and active without use of DME, presumedly.    HOME SUPPORT PRIOR TO ADMISSION: needs clarification    Physical Therapy Goals    Revised 2/162024  1.  Patient will move from supine to sit and sit to supine in bed with mod assistance within 7 day(s).    2.  Patient will perform sit to stand with max assistance within 7 day(s).  3.  Patient will transfer from bed to chair and chair to bed with max assistance using the least restrictive device within 7 day(s).  4. Patient will sit edge of bed with contact guard assist for approx 5 minutes within 7 days.  5.  Patient will improve Hwang Balance score by 7 points within 7 days  Initiated 2/9/2024  1.  Patient will move from supine to sit and sit to supine in bed with minimal assistance within 7 day(s).    2.  Patient will perform sit to stand with minimal assistance within 7 day(s).  3.  Patient will transfer from bed to chair and chair to bed with minimal assistance using the least restrictive device within 7 day(s).  4.  Patient will ambulate with moderate assistance for 50 feet with the least restrictive device within 7 day(s).   5.  Patient will improve Hwang Balance score by 7 points within 7 days.   Outcome: Progressing       PHYSICAL THERAPY TREATMENT    Patient: Ronald Caro (53 y.o. adult)  Date: 2/22/2024  Diagnosis: Cerebrovascular accident (CVA) due to occlusion of left middle cerebral artery (HCC) [I63.512]  Acute cerebrovascular accident (CVA) due to ischemia (HCC) [I63.9] Acute cerebrovascular accident (CVA) due to ischemia (HCC)      Precautions: Fall Risk, Modified Diet                      ASSESSMENT:  Patient continues to benefit from skilled PT services and is slowly progressing towards goals. Patient 
  Problem: Discharge Planning  Goal: Discharge to home or other facility with appropriate resources  2/19/2024 0001 by Nikko Burgess RN  Outcome: Progressing     Problem: Pain  Goal: Verbalizes/displays adequate comfort level or baseline comfort level  2/19/2024 0001 by Nikko Burgess RN  Outcome: Progressing     Problem: Safety - Adult  Goal: Free from fall injury  2/19/2024 0001 by Nikko Burgess RN  Outcome: Progressing  Flowsheets (Taken 2/18/2024 2000)  Free From Fall Injury: Based on caregiver fall risk screen, instruct family/caregiver to ask for assistance with transferring infant if caregiver noted to have fall risk factors     Problem: Skin/Tissue Integrity  Goal: Absence of new skin breakdown  Description: 1.  Monitor for areas of redness and/or skin breakdown  2.  Assess vascular access sites hourly  3.  Every 4-6 hours minimum:  Change oxygen saturation probe site  4.  Every 4-6 hours:  If on nasal continuous positive airway pressure, respiratory therapy assess nares and determine need for appliance change or resting period.  2/19/2024 0001 by Nikko Burgess RN  Outcome: Progressing     Problem: Chronic Conditions and Co-morbidities  Goal: Patient's chronic conditions and co-morbidity symptoms are monitored and maintained or improved  2/19/2024 0001 by Nikko Burgess RN  Outcome: Progressing     
  Problem: Discharge Planning  Goal: Discharge to home or other facility with appropriate resources  2/26/2024 1243 by Janene Lopez RN  Outcome: Progressing  Flowsheets (Taken 2/26/2024 0800)  Discharge to home or other facility with appropriate resources:   Identify barriers to discharge with patient and caregiver   Arrange for needed discharge resources and transportation as appropriate   Identify discharge learning needs (meds, wound care, etc)  2/26/2024 0232 by Abigail Marcus RN  Outcome: Progressing  2/26/2024 0232 by Abigail Marcus RN  Outcome: Progressing     Problem: Pain  Goal: Verbalizes/displays adequate comfort level or baseline comfort level  2/26/2024 1243 by Janene Lopez RN  Outcome: Progressing  2/26/2024 0232 by Abigail Marcus RN  Outcome: Progressing  2/26/2024 0232 by Abigail Marcus RN  Outcome: Progressing     Problem: Safety - Adult  Goal: Free from fall injury  2/26/2024 1243 by Janene Lopez RN  Outcome: Progressing  2/26/2024 0232 by Abigail Marcus RN  Outcome: Progressing  2/26/2024 0232 by Abigail Marcus RN  Outcome: Progressing     Problem: Skin/Tissue Integrity  Goal: Absence of new skin breakdown  Description: 1.  Monitor for areas of redness and/or skin breakdown  2.  Assess vascular access sites hourly  3.  Every 4-6 hours minimum:  Change oxygen saturation probe site  4.  Every 4-6 hours:  If on nasal continuous positive airway pressure, respiratory therapy assess nares and determine need for appliance change or resting period.  2/26/2024 1243 by Janene Lopez RN  Outcome: Progressing  2/26/2024 0232 by Abigail Marcus RN  Outcome: Progressing  2/26/2024 0232 by Abigail Marcus RN  Outcome: Progressing     Problem: Chronic Conditions and Co-morbidities  Goal: Patient's chronic conditions and co-morbidity symptoms are monitored and maintained or improved  2/26/2024 1243 by Janene Lopez RN  Outcome: Progressing  Flowsheets (Taken 2/26/2024 0800)  Care Plan - 
  Problem: Discharge Planning  Goal: Discharge to home or other facility with appropriate resources  2/27/2024 1224 by Janene Lopez RN  Outcome: Progressing  Flowsheets (Taken 2/27/2024 0800)  Discharge to home or other facility with appropriate resources:   Identify barriers to discharge with patient and caregiver   Arrange for needed discharge resources and transportation as appropriate   Identify discharge learning needs (meds, wound care, etc)  2/27/2024 0541 by Richa Kirk RN  Outcome: Progressing     Problem: Pain  Goal: Verbalizes/displays adequate comfort level or baseline comfort level  2/27/2024 1224 by Janene Lopez RN  Outcome: Progressing  2/27/2024 0541 by Richa Kirk RN  Outcome: Progressing     Problem: Safety - Adult  Goal: Free from fall injury  2/27/2024 1224 by Janene Lopez RN  Outcome: Progressing  Flowsheets (Taken 2/27/2024 0800)  Free From Fall Injury: Instruct family/caregiver on patient safety  2/27/2024 0541 by Richa Kirk RN  Outcome: Progressing     Problem: Skin/Tissue Integrity  Goal: Absence of new skin breakdown  Description: 1.  Monitor for areas of redness and/or skin breakdown  2.  Assess vascular access sites hourly  3.  Every 4-6 hours minimum:  Change oxygen saturation probe site  4.  Every 4-6 hours:  If on nasal continuous positive airway pressure, respiratory therapy assess nares and determine need for appliance change or resting period.  2/27/2024 1224 by Janene Lopez RN  Outcome: Progressing  2/27/2024 0541 by Richa Kirk RN  Outcome: Progressing     Problem: Chronic Conditions and Co-morbidities  Goal: Patient's chronic conditions and co-morbidity symptoms are monitored and maintained or improved  2/27/2024 1224 by Janene Lopez RN  Outcome: Progressing  Flowsheets (Taken 2/27/2024 0800)  Care Plan - Patient's Chronic Conditions and Co-Morbidity Symptoms are Monitored and Maintained or Improved:   Monitor and assess patient's chronic conditions 
  Problem: Discharge Planning  Goal: Discharge to home or other facility with appropriate resources  Outcome: Progressing     Problem: Pain  Goal: Verbalizes/displays adequate comfort level or baseline comfort level  Outcome: Progressing     Problem: Safety - Adult  Goal: Free from fall injury  Outcome: Progressing     Problem: Skin/Tissue Integrity  Goal: Absence of new skin breakdown  Description: 1.  Monitor for areas of redness and/or skin breakdown  2.  Assess vascular access sites hourly  3.  Every 4-6 hours minimum:  Change oxygen saturation probe site  4.  Every 4-6 hours:  If on nasal continuous positive airway pressure, respiratory therapy assess nares and determine need for appliance change or resting period.  Outcome: Progressing     Problem: Chronic Conditions and Co-morbidities  Goal: Patient's chronic conditions and co-morbidity symptoms are monitored and maintained or improved  Outcome: Progressing     Problem: ABCDS Injury Assessment  Goal: Absence of physical injury  Outcome: Progressing     
  Problem: Discharge Planning  Goal: Discharge to home or other facility with appropriate resources  Outcome: Progressing     Problem: Pain  Goal: Verbalizes/displays adequate comfort level or baseline comfort level  Outcome: Progressing     Problem: Safety - Adult  Goal: Free from fall injury  Outcome: Progressing     Problem: Skin/Tissue Integrity  Goal: Absence of new skin breakdown  Description: 1.  Monitor for areas of redness and/or skin breakdown  2.  Assess vascular access sites hourly  3.  Every 4-6 hours minimum:  Change oxygen saturation probe site  4.  Every 4-6 hours:  If on nasal continuous positive airway pressure, respiratory therapy assess nares and determine need for appliance change or resting period.  Outcome: Progressing     Problem: Chronic Conditions and Co-morbidities  Goal: Patient's chronic conditions and co-morbidity symptoms are monitored and maintained or improved  Outcome: Progressing     Problem: ABCDS Injury Assessment  Goal: Absence of physical injury  Outcome: Progressing     Problem: Physical Therapy - Adult  Goal: By Discharge: Performs mobility at highest level of function for planned discharge setting.  See evaluation for individualized goals.  Description: FUNCTIONAL STATUS PRIOR TO ADMISSION: Patient was independent and active without use of DME, presumedly.    HOME SUPPORT PRIOR TO ADMISSION: needs clarification    Physical Therapy Goals    Revised 2/162024  1.  Patient will move from supine to sit and sit to supine in bed with mod assistance within 7 day(s).    2.  Patient will perform sit to stand with max assistance within 7 day(s).  3.  Patient will transfer from bed to chair and chair to bed with max assistance using the least restrictive device within 7 day(s).  4. Patient will sit edge of bed with contact guard assist for approx 5 minutes within 7 days.  5.  Patient will improve Hwang Balance score by 7 points within 7 days  Initiated 2/9/2024  1.  Patient will move 
  Problem: Discharge Planning  Goal: Discharge to home or other facility with appropriate resources  Outcome: Progressing     Problem: Pain  Goal: Verbalizes/displays adequate comfort level or baseline comfort level  Outcome: Progressing     Problem: Safety - Adult  Goal: Free from fall injury  Outcome: Progressing     Problem: Skin/Tissue Integrity  Goal: Absence of new skin breakdown  Description: 1.  Monitor for areas of redness and/or skin breakdown  2.  Assess vascular access sites hourly  3.  Every 4-6 hours minimum:  Change oxygen saturation probe site  4.  Every 4-6 hours:  If on nasal continuous positive airway pressure, respiratory therapy assess nares and determine need for appliance change or resting period.  Outcome: Progressing     Problem: Chronic Conditions and Co-morbidities  Goal: Patient's chronic conditions and co-morbidity symptoms are monitored and maintained or improved  Outcome: Progressing     Problem: ABCDS Injury Assessment  Goal: Absence of physical injury  Outcome: Progressing     Problem: Safety - Medical Restraint  Goal: Remains free of injury from restraints (Restraint for Interference with Medical Device)  Description: INTERVENTIONS:  1. Determine that other, less restrictive measures have been tried or would not be effective before applying the restraint  2. Evaluate the patient's condition at the time of restraint application  3. Inform patient/family regarding the reason for restraint  4. Q2H: Monitor safety, psychosocial status, comfort, nutrition and hydration  Outcome: Progressing     Problem: Safety - Violent/Self-destructive Restraint  Goal: Remains free of injury from restraints (Restraint for Violent/Self-Destructive Behavior)  Description: INTERVENTIONS:  1. Determine that de-escalation and other, less restrictive measures have been tried or would not be effective before applying the restraint  2. Identify and document the criteria for restraint  3. Evaluate the patient's 
  Problem: Discharge Planning  Goal: Discharge to home or other facility with appropriate resources  Outcome: Progressing     Problem: Pain  Goal: Verbalizes/displays adequate comfort level or baseline comfort level  Outcome: Progressing     Problem: Safety - Adult  Goal: Free from fall injury  Outcome: Progressing     Problem: Skin/Tissue Integrity  Goal: Absence of new skin breakdown  Description: 1.  Monitor for areas of redness and/or skin breakdown  2.  Assess vascular access sites hourly  3.  Every 4-6 hours minimum:  Change oxygen saturation probe site  4.  Every 4-6 hours:  If on nasal continuous positive airway pressure, respiratory therapy assess nares and determine need for appliance change or resting period.  Outcome: Progressing     Problem: Chronic Conditions and Co-morbidities  Goal: Patient's chronic conditions and co-morbidity symptoms are monitored and maintained or improved  Outcome: Progressing     Problem: ABCDS Injury Assessment  Goal: Absence of physical injury  Outcome: Progressing     Problem: Safety - Medical Restraint  Goal: Remains free of injury from restraints (Restraint for Interference with Medical Device)  Description: INTERVENTIONS:  1. Determine that other, less restrictive measures have been tried or would not be effective before applying the restraint  2. Evaluate the patient's condition at the time of restraint application  3. Inform patient/family regarding the reason for restraint  4. Q2H: Monitor safety, psychosocial status, comfort, nutrition and hydration  Outcome: Progressing     Problem: Safety - Violent/Self-destructive Restraint  Goal: Remains free of injury from restraints (Restraint for Violent/Self-Destructive Behavior)  Description: INTERVENTIONS:  1. Determine that de-escalation and other, less restrictive measures have been tried or would not be effective before applying the restraint  2. Identify and document the criteria for restraint  3. Evaluate the patient's 
  Problem: Discharge Planning  Goal: Discharge to home or other facility with appropriate resources  Outcome: Progressing     Problem: Pain  Goal: Verbalizes/displays adequate comfort level or baseline comfort level  Outcome: Progressing     Problem: Safety - Adult  Goal: Free from fall injury  Outcome: Progressing  Flowsheets (Taken 2/17/2024 2000)  Free From Fall Injury: Based on caregiver fall risk screen, instruct family/caregiver to ask for assistance with transferring infant if caregiver noted to have fall risk factors     Problem: Skin/Tissue Integrity  Goal: Absence of new skin breakdown  Description: 1.  Monitor for areas of redness and/or skin breakdown  2.  Assess vascular access sites hourly  3.  Every 4-6 hours minimum:  Change oxygen saturation probe site  4.  Every 4-6 hours:  If on nasal continuous positive airway pressure, respiratory therapy assess nares and determine need for appliance change or resting period.  Outcome: Progressing     Problem: Chronic Conditions and Co-morbidities  Goal: Patient's chronic conditions and co-morbidity symptoms are monitored and maintained or improved  Outcome: Progressing     Problem: ABCDS Injury Assessment  Goal: Absence of physical injury  Outcome: Progressing     Problem: Safety - Medical Restraint  Goal: Remains free of injury from restraints (Restraint for Interference with Medical Device)  Description: INTERVENTIONS:  1. Determine that other, less restrictive measures have been tried or would not be effective before applying the restraint  2. Evaluate the patient's condition at the time of restraint application  3. Inform patient/family regarding the reason for restraint  4. Q2H: Monitor safety, psychosocial status, comfort, nutrition and hydration  Outcome: Progressing     Problem: Safety - Violent/Self-destructive Restraint  Goal: Remains free of injury from restraints (Restraint for Violent/Self-Destructive Behavior)  Description: INTERVENTIONS:  1. 
  Problem: Discharge Planning  Goal: Discharge to home or other facility with appropriate resources  Outcome: Progressing     Problem: Pain  Goal: Verbalizes/displays adequate comfort level or baseline comfort level  Outcome: Progressing     Problem: Safety - Adult  Goal: Free from fall injury  Outcome: Progressing  Flowsheets (Taken 2/18/2024 2000)  Free From Fall Injury: Based on caregiver fall risk screen, instruct family/caregiver to ask for assistance with transferring infant if caregiver noted to have fall risk factors     Problem: Skin/Tissue Integrity  Goal: Absence of new skin breakdown  Description: 1.  Monitor for areas of redness and/or skin breakdown  2.  Assess vascular access sites hourly  3.  Every 4-6 hours minimum:  Change oxygen saturation probe site  4.  Every 4-6 hours:  If on nasal continuous positive airway pressure, respiratory therapy assess nares and determine need for appliance change or resting period.  Outcome: Progressing     Problem: Chronic Conditions and Co-morbidities  Goal: Patient's chronic conditions and co-morbidity symptoms are monitored and maintained or improved  Outcome: Progressing     Problem: ABCDS Injury Assessment  Goal: Absence of physical injury  Outcome: Progressing     Problem: SLP Adult - Impaired Swallowing  Goal: By Discharge: Advance to least restrictive diet without signs or symptoms of aspiration for planned discharge setting.  See evaluation for individualized goals.  Description: Speech Pathology Goals     1.  Patient will participate in re-evaluation of swallow physiology within 7 days. Met 2/18/24  2. Added 2/18/24 Patient will tolerate baseline diet without adverse effects within 7 days.   2/18/2024 1529 by Karen Jsutin, SLP  Outcome: Progressing     Problem: Safety - Medical Restraint  Goal: Remains free of injury from restraints (Restraint for Interference with Medical Device)  Description: INTERVENTIONS:  1. Determine that other, less restrictive 
  Problem: Discharge Planning  Goal: Discharge to home or other facility with appropriate resources  Outcome: Progressing  Flowsheets (Taken 2/14/2024 0800)  Discharge to home or other facility with appropriate resources: Identify barriers to discharge with patient and caregiver     Problem: Pain  Goal: Verbalizes/displays adequate comfort level or baseline comfort level  Outcome: Progressing     Problem: Safety - Adult  Goal: Free from fall injury  Outcome: Progressing  Flowsheets (Taken 2/14/2024 0800)  Free From Fall Injury: Instruct family/caregiver on patient safety     Problem: Skin/Tissue Integrity  Goal: Absence of new skin breakdown  Description: 1.  Monitor for areas of redness and/or skin breakdown  2.  Assess vascular access sites hourly  3.  Every 4-6 hours minimum:  Change oxygen saturation probe site  4.  Every 4-6 hours:  If on nasal continuous positive airway pressure, respiratory therapy assess nares and determine need for appliance change or resting period.  Outcome: Progressing     Problem: Chronic Conditions and Co-morbidities  Goal: Patient's chronic conditions and co-morbidity symptoms are monitored and maintained or improved  Outcome: Progressing  Flowsheets (Taken 2/14/2024 0800)  Care Plan - Patient's Chronic Conditions and Co-Morbidity Symptoms are Monitored and Maintained or Improved: Monitor and assess patient's chronic conditions and comorbid symptoms for stability, deterioration, or improvement     Problem: ABCDS Injury Assessment  Goal: Absence of physical injury  Outcome: Progressing  Flowsheets (Taken 2/14/2024 0800)  Absence of Physical Injury: Implement safety measures based on patient assessment     Problem: Safety - Medical Restraint  Goal: Remains free of injury from restraints (Restraint for Interference with Medical Device)  Description: INTERVENTIONS:  1. Determine that other, less restrictive measures have been tried or would not be effective before applying the 
  Problem: Discharge Planning  Goal: Discharge to home or other facility with appropriate resources  Outcome: Progressing  Flowsheets (Taken 2/15/2024 0800)  Discharge to home or other facility with appropriate resources: Identify barriers to discharge with patient and caregiver     Problem: Pain  Goal: Verbalizes/displays adequate comfort level or baseline comfort level  Outcome: Progressing     Problem: Safety - Adult  Goal: Free from fall injury  Outcome: Progressing  Flowsheets (Taken 2/15/2024 0800)  Free From Fall Injury: Instruct family/caregiver on patient safety     Problem: Skin/Tissue Integrity  Goal: Absence of new skin breakdown  Description: 1.  Monitor for areas of redness and/or skin breakdown  2.  Assess vascular access sites hourly  3.  Every 4-6 hours minimum:  Change oxygen saturation probe site  4.  Every 4-6 hours:  If on nasal continuous positive airway pressure, respiratory therapy assess nares and determine need for appliance change or resting period.  Outcome: Progressing     Problem: Chronic Conditions and Co-morbidities  Goal: Patient's chronic conditions and co-morbidity symptoms are monitored and maintained or improved  Outcome: Progressing  Flowsheets (Taken 2/15/2024 0800)  Care Plan - Patient's Chronic Conditions and Co-Morbidity Symptoms are Monitored and Maintained or Improved: Monitor and assess patient's chronic conditions and comorbid symptoms for stability, deterioration, or improvement     Problem: ABCDS Injury Assessment  Goal: Absence of physical injury  Outcome: Progressing  Flowsheets (Taken 2/15/2024 0800)  Absence of Physical Injury: Implement safety measures based on patient assessment     Problem: SLP Adult - Impaired Swallowing  Goal: By Discharge: Advance to least restrictive diet without signs or symptoms of aspiration for planned discharge setting.  See evaluation for individualized goals.  Description: Speech Pathology Goals     1.  Patient will participate in 
  Problem: Discharge Planning  Goal: Discharge to home or other facility with appropriate resources  Outcome: Progressing  Flowsheets (Taken 2/16/2024 2000)  Discharge to home or other facility with appropriate resources: Identify barriers to discharge with patient and caregiver     Problem: Pain  Goal: Verbalizes/displays adequate comfort level or baseline comfort level  Outcome: Progressing     Problem: Safety - Adult  Goal: Free from fall injury  Outcome: Progressing  Flowsheets (Taken 2/16/2024 2000)  Free From Fall Injury:   Instruct family/caregiver on patient safety   Based on caregiver fall risk screen, instruct family/caregiver to ask for assistance with transferring infant if caregiver noted to have fall risk factors     Problem: Skin/Tissue Integrity  Goal: Absence of new skin breakdown  Description: 1.  Monitor for areas of redness and/or skin breakdown  2.  Assess vascular access sites hourly  3.  Every 4-6 hours minimum:  Change oxygen saturation probe site  4.  Every 4-6 hours:  If on nasal continuous positive airway pressure, respiratory therapy assess nares and determine need for appliance change or resting period.  Outcome: Progressing     Problem: Chronic Conditions and Co-morbidities  Goal: Patient's chronic conditions and co-morbidity symptoms are monitored and maintained or improved  Outcome: Progressing  Flowsheets (Taken 2/16/2024 2000)  Care Plan - Patient's Chronic Conditions and Co-Morbidity Symptoms are Monitored and Maintained or Improved: Monitor and assess patient's chronic conditions and comorbid symptoms for stability, deterioration, or improvement     Problem: ABCDS Injury Assessment  Goal: Absence of physical injury  Outcome: Progressing     Problem: Safety - Medical Restraint  Goal: Remains free of injury from restraints (Restraint for Interference with Medical Device)  Description: INTERVENTIONS:  1. Determine that other, less restrictive measures have been tried or would not be 
  Problem: Discharge Planning  Goal: Discharge to home or other facility with appropriate resources  Outcome: Progressing  Flowsheets (Taken 2/20/2024 0800)  Discharge to home or other facility with appropriate resources: Identify barriers to discharge with patient and caregiver     Problem: Pain  Goal: Verbalizes/displays adequate comfort level or baseline comfort level  Outcome: Progressing  Flowsheets (Taken 2/20/2024 0800)  Verbalizes/displays adequate comfort level or baseline comfort level: Encourage patient to monitor pain and request assistance     Problem: Safety - Adult  Goal: Free from fall injury  Outcome: Progressing     Problem: Skin/Tissue Integrity  Goal: Absence of new skin breakdown  Description: 1.  Monitor for areas of redness and/or skin breakdown  2.  Assess vascular access sites hourly  3.  Every 4-6 hours minimum:  Change oxygen saturation probe site  4.  Every 4-6 hours:  If on nasal continuous positive airway pressure, respiratory therapy assess nares and determine need for appliance change or resting period.  Outcome: Progressing     Problem: Chronic Conditions and Co-morbidities  Goal: Patient's chronic conditions and co-morbidity symptoms are monitored and maintained or improved  Outcome: Progressing  Flowsheets (Taken 2/20/2024 0800)  Care Plan - Patient's Chronic Conditions and Co-Morbidity Symptoms are Monitored and Maintained or Improved:   Monitor and assess patient's chronic conditions and comorbid symptoms for stability, deterioration, or improvement   Collaborate with multidisciplinary team to address chronic and comorbid conditions and prevent exacerbation or deterioration   Update acute care plan with appropriate goals if chronic or comorbid symptoms are exacerbated and prevent overall improvement and discharge     Problem: ABCDS Injury Assessment  Goal: Absence of physical injury  Outcome: Progressing     Problem: Occupational Therapy - Adult  Goal: By Discharge: Performs 
  Problem: Discharge Planning  Goal: Discharge to home or other facility with appropriate resources  Recent Flowsheet Documentation  Taken 2/11/2024 1955 by Jaxon Archibald, RN  Discharge to home or other facility with appropriate resources: Identify barriers to discharge with patient and caregiver     Problem: Chronic Conditions and Co-morbidities  Goal: Patient's chronic conditions and co-morbidity symptoms are monitored and maintained or improved  Recent Flowsheet Documentation  Taken 2/11/2024 1955 by Jaxon Archibald, RN  Care Plan - Patient's Chronic Conditions and Co-Morbidity Symptoms are Monitored and Maintained or Improved: Monitor and assess patient's chronic conditions and comorbid symptoms for stability, deterioration, or improvement     
  Problem: Occupational Therapy - Adult  Goal: By Discharge: Performs self-care activities at highest level of function for planned discharge setting.  See evaluation for individualized goals.  Description: FUNCTIONAL STATUS PRIOR TO ADMISSION: Patient unable to provide PLOF d/t AMS, dysarthria, and acute neuro deficits. Lack of information provided in chart, will follow up as able for clarification.     Occupational Therapy Goals  Initiated 2/9/2024   1.  Patient will perform at least one seated grooming task with Moderate Assist within 7 day(s). DISCONTINUE  2.  Patient will perform upper body dressing with Maximal Assist within 7 day(s). DISCONTINUE  3.  Patient will perform lower body dressing with Maximal Assist within 7 day(s). DISCONTINUE  4.  Patient will perform toilet transfers [place] [facility] [place] Assist within 7 day(s). DISCONTINUE  5.  Patient will follow at least 10% of simple 1 step commands to maximize independence with functional tasks within 7 day(s).  CONTINUE  6.  Patient will complete the Fugl Garcia within 7 days. DISCONTINUE    Weekly Re-Assessment 2/16.  Reviewed 2/23/24 and no goals met at this time.  Continue all goals.    1.  Patient will follow at least 10% of simple 1 step commands to maximize independence with functional tasks within 7 day(s).    2. Patient with use LUE to complete a portion of 1 functional task with verbal and tactile cues for initiation. (Ie brush hair, fix sock)  3. Pt will roll towards right side with Max A in prep for toileting at bed level.    2/26/2024 1316 by Lucy Garrido, MARY  Outcome: Progressing  OCCUPATIONAL THERAPY TREATMENT  Patient: Ronald Caro (53 y.o. adult)  Date: 2/26/2024  Primary Diagnosis: Cerebrovascular accident (CVA) due to occlusion of left middle cerebral artery (HCC) [I63.512]  Acute cerebrovascular accident (CVA) due to ischemia (HCC) [I63.9]       Precautions: Modified Diet, Fall Risk                Chart, occupational therapy 
  Problem: Occupational Therapy - Adult  Goal: By Discharge: Performs self-care activities at highest level of function for planned discharge setting.  See evaluation for individualized goals.  Description: FUNCTIONAL STATUS PRIOR TO ADMISSION: Patient unable to provide PLOF d/t AMS, dysarthria, and acute neuro deficits. Lack of information provided in chart, will follow up as able for clarification.     Occupational Therapy Goals  Initiated 2/9/2024   1.  Patient will perform at least one seated grooming task with Moderate Assist within 7 day(s). DISCONTINUE  2.  Patient will perform upper body dressing with Maximal Assist within 7 day(s). DISCONTINUE  3.  Patient will perform lower body dressing with Maximal Assist within 7 day(s). DISCONTINUE  4.  Patient will perform toilet transfers to/from [facility] with Maximal Assist within 7 day(s). DISCONTINUE  5.  Patient will follow at least 10% of simple 1 step commands to maximize independence with functional tasks within 7 day(s).  CONTINUE  6.  Patient will complete the Fugl Garcia within 7 days. DISCONTINUE    Weekly Re-Assessment 2/16    1.  Patient will follow at least 10% of simple 1 step commands to maximize independence with functional tasks within 7 day(s).  CONTINUE  2. Patient with use LUE to complete a portion of 1 functional task with verbal and tactile cues for initiation. Ie brush hair, fix sock  3. Pt will roll towards right side with MaxA in prep for toileting at bed level.    Outcome: Progressing   OCCUPATIONAL THERAPY TREATMENT  Patient: Ronald Caro (53 y.o. adult)  Date: 2/20/2024  Primary Diagnosis: Cerebrovascular accident (CVA) due to occlusion of left middle cerebral artery (HCC) [I63.512]  Acute cerebrovascular accident (CVA) due to ischemia (HCC) [I63.9]       Precautions: Fall Risk, Modified Diet                Chart, occupational therapy assessment, plan of care, and goals were reviewed.    ASSESSMENT  Patient continues to benefit from skilled 
  Problem: Occupational Therapy - Adult  Goal: By Discharge: Performs self-care activities at highest level of function for planned discharge setting.  See evaluation for individualized goals.  Description: FUNCTIONAL STATUS PRIOR TO ADMISSION: Patient unable to provide PLOF d/t AMS, dysarthria, and acute neuro deficits. Lack of information provided in chart, will follow up as able for clarification.     Occupational Therapy Goals  Initiated 2/9/2024   1.  Patient will perform at least one seated grooming task with Moderate Assist within 7 day(s). DISCONTINUE  2.  Patient will perform upper body dressing with Maximal Assist within 7 day(s). DISCONTINUE  3.  Patient will perform lower body dressing with Maximal Assist within 7 day(s). DISCONTINUE  4.  Patient will perform toilet transfers to/from [facility] with Maximal Assist within 7 day(s). DISCONTINUE  5.  Patient will follow at least 10% of simple 1 step commands to maximize independence with functional tasks within 7 day(s).  CONTINUE  6.  Patient will complete the Fugl Garcia within 7 days. DISCONTINUE    Weekly Re-Assessment 2/16    1.  Patient will follow at least 10% of simple 1 step commands to maximize independence with functional tasks within 7 day(s).  CONTINUE  2. Patient with use LUE to complete a portion of 1 functional task with verbal and tactile cues for initiation. Ie brush hair, fix sock  3. Pt will roll towards right side with MaxA in prep for toileting at bed level.    Outcome: Progressing     OCCUPATIONAL THERAPY TREATMENT  Patient: Ronald Caro (53 y.o. adult)  Date: 2/22/2024  Primary Diagnosis: Cerebrovascular accident (CVA) due to occlusion of left middle cerebral artery (HCC) [I63.512]  Acute cerebrovascular accident (CVA) due to ischemia (HCC) [I63.9]       Precautions: Fall Risk, Modified Diet                Chart, occupational therapy assessment, plan of care, and goals were reviewed.    ASSESSMENT  Patient continues to benefit from 
  Problem: Pain  Goal: Verbalizes/displays adequate comfort level or baseline comfort level  Outcome: Progressing     Problem: Safety - Adult  Goal: Free from fall injury  Outcome: Progressing     Problem: Chronic Conditions and Co-morbidities  Goal: Patient's chronic conditions and co-morbidity symptoms are monitored and maintained or improved  Outcome: Progressing     
  Problem: Physical Therapy - Adult  Goal: By Discharge: Performs mobility at highest level of function for planned discharge setting.  See evaluation for individualized goals.  Description: FUNCTIONAL STATUS PRIOR TO ADMISSION: Patient was independent and active without use of DME, presumedly.    HOME SUPPORT PRIOR TO ADMISSION: needs clarification    Physical Therapy Goals    Revised 2/162024  1.  Patient will move from supine to sit and sit to supine in bed with mod assistance within 7 day(s).    2.  Patient will perform sit to stand with max assistance within 7 day(s).  3.  Patient will transfer from bed to chair and chair to bed with max assistance using the least restrictive device within 7 day(s).  4. Patient will sit edge of bed with contact guard assist for approx 5 minutes within 7 days.  5.  Patient will improve Hwang Balance score by 7 points within 7 days  Initiated 2/9/2024  1.  Patient will move from supine to sit and sit to supine in bed with minimal assistance within 7 day(s).    2.  Patient will perform sit to stand with minimal assistance within 7 day(s).  3.  Patient will transfer from bed to chair and chair to bed with minimal assistance using the least restrictive device within 7 day(s).  4.  Patient will ambulate with moderate assistance for 50 feet with the least restrictive device within 7 day(s).   5.  Patient will improve Hwang Balance score by 7 points within 7 days.   Outcome: Progressing       PHYSICAL THERAPY TREATMENT    Patient: Ronald Caro (53 y.o. adult)  Date: 2/19/2024  Diagnosis: Cerebrovascular accident (CVA) due to occlusion of left middle cerebral artery (HCC) [I63.512]  Acute cerebrovascular accident (CVA) due to ischemia (HCC) [I63.9] Acute cerebrovascular accident (CVA) due to ischemia (HCC)      Precautions: Fall Risk, Modified Diet                      ASSESSMENT:  Patient continues to benefit from skilled PT services and is progressing towards goals. Patient 
  Problem: Physical Therapy - Adult  Goal: By Discharge: Performs mobility at highest level of function for planned discharge setting.  See evaluation for individualized goals.  Description: FUNCTIONAL STATUS PRIOR TO ADMISSION: Patient was independent and active without use of DME, presumedly.    HOME SUPPORT PRIOR TO ADMISSION: needs clarification    Physical Therapy Goals    Revised 2/162024  1.  Patient will move from supine to sit and sit to supine in bed with mod assistance within 7 day(s).    2.  Patient will perform sit to stand with max assistance within 7 day(s).  3.  Patient will transfer from bed to chair and chair to bed with max assistance using the least restrictive device within 7 day(s).  4. Patient will sit edge of bed with contact guard assist for approx 5 minutes within 7 days.  5.  Patient will improve Hwang Balance score by 7 points within 7 days  Initiated 2/9/2024  1.  Patient will move from supine to sit and sit to supine in bed with minimal assistance within 7 day(s).    2.  Patient will perform sit to stand with minimal assistance within 7 day(s).  3.  Patient will transfer from bed to chair and chair to bed with minimal assistance using the least restrictive device within 7 day(s).  4.  Patient will ambulate with moderate assistance for 50 feet with the least restrictive device within 7 day(s).   5.  Patient will improve Hwang Balance score by 7 points within 7 days.   Outcome: Progressing       PHYSICAL THERAPY TREATMENT    Patient: Ronald Caro (53 y.o. adult)  Date: 2/20/2024  Diagnosis: Cerebrovascular accident (CVA) due to occlusion of left middle cerebral artery (HCC) [I63.512]  Acute cerebrovascular accident (CVA) due to ischemia (HCC) [I63.9] Acute cerebrovascular accident (CVA) due to ischemia (HCC)      Precautions: Fall Risk, Modified Diet                      ASSESSMENT:  Patient continues to benefit from skilled PT services and is slowly progressing towards goals. Patient 
  Problem: Physical Therapy - Adult  Goal: By Discharge: Performs mobility at highest level of function for planned discharge setting.  See evaluation for individualized goals.  Description: FUNCTIONAL STATUS PRIOR TO ADMISSION: Patient was independent and active without use of DME, presumedly.    HOME SUPPORT PRIOR TO ADMISSION: needs clarification    Physical Therapy Goals    Revised 2/23/2024  1.  Patient will move from supine to sit and sit to supine in bed with mod assistance within 7 day(s).    2.  Patient will perform sit to stand with mod assistance within 7 day(s).  3.  Patient will transfer from bed to chair and chair to bed with max assistance using the least restrictive device within 7 day(s).  4. Patient will sit edge of bed with stand by assist for approx 5 minutes within 7 days.  5.  Patient will improve Hwang Balance score by 7 points within 7 days    Revised 2/162024  1.  Patient will move from supine to sit and sit to supine in bed with mod assistance within 7 day(s).    2.  Patient will perform sit to stand with max assistance within 7 day(s).  3.  Patient will transfer from bed to chair and chair to bed with max assistance using the least restrictive device within 7 day(s).  4. Patient will sit edge of bed with contact guard assist for approx 5 minutes within 7 days.  5.  Patient will improve Hwang Balance score by 7 points within 7 days    Initiated 2/9/2024  1.  Patient will move from supine to sit and sit to supine in bed with minimal assistance within 7 day(s).    2.  Patient will perform sit to stand with minimal assistance within 7 day(s).  3.  Patient will transfer from bed to chair and chair to bed with minimal assistance using the least restrictive device within 7 day(s).  4.  Patient will ambulate with moderate assistance for 50 feet with the least restrictive device within 7 day(s).   5.  Patient will improve Hwang Balance score by 7 points within 7 days.   Outcome: Progressing   
  Problem: Physical Therapy - Adult  Goal: By Discharge: Performs mobility at highest level of function for planned discharge setting.  See evaluation for individualized goals.  Description: FUNCTIONAL STATUS PRIOR TO ADMISSION: Patient was independent and active without use of DME, presumedly.    HOME SUPPORT PRIOR TO ADMISSION: needs clarification    Physical Therapy Goals  Initiated 2/9/2024  1.  Patient will move from supine to sit and sit to supine in bed with minimal assistance within 7 day(s).    2.  Patient will perform sit to stand with minimal assistance within 7 day(s).  3.  Patient will transfer from bed to chair and chair to bed with minimal assistance using the least restrictive device within 7 day(s).  4.  Patient will ambulate with moderate assistance for 50 feet with the least restrictive device within 7 day(s).   5.  Patient will improve Hwang Balance score by 7 points within 7 days.   Outcome: Progressing            PHYSICAL THERAPY TREATMENT    Patient: Ronald Caro (53 y.o. adult)  Date: 2/13/2024  Diagnosis: Cerebrovascular accident (CVA) due to occlusion of left middle cerebral artery (HCC) [I63.512]  Acute cerebrovascular accident (CVA) due to ischemia (HCC) [I63.9] Acute cerebrovascular accident (CVA) due to ischemia (HCC)      Precautions: Fall Risk (SBP <160)                      ASSESSMENT:  Patient continues to benefit from skilled PT services and is slowly progressing towards goals. Patient demonstrating right lateral lean - using left upper ext to increase lateral lean. Patient needing max tactile cuing to place left upper ext in midline. Patient progress limited by difficulty with expressive and receptive communication- will follow some gesture for functional tasks. Patient with right inattention with cervical rotation to left.   Patient received prom cervical rotation to right, able to bring to midline.          PLAN:  Patient continues to benefit from skilled intervention to 
  Problem: SLP Adult - Impaired Swallowing  Goal: By Discharge: Advance to least restrictive diet without signs or symptoms of aspiration for planned discharge setting.  See evaluation for individualized goals.  Description: Speech Pathology Goals     1.  Patient will participate in re-evaluation of swallow physiology within 7 days.  2/13/2024 1119 by Sejal Pelletier, SLP  Outcome: Not Progressing     Problem: SLP Adult - Impaired Communication  Description: 1. Patient will follow basic 1-step directions using max multi-modality cues with improved accuracy >20%  2. Patient will answer basic yes/no questions using max multi-modality cues with improved accuracy >20%  Goal: By Discharge: Demonstrates communication skills at highest level of function for planned discharge setting.  See evaluation for individualized goals.  Description: 1. Patient will follow basic 1-step directions using max multi-modality cues with improved accuracy >20%  2. Patient will answer basic yes/no questions using max multi-modality cues with improved accuracy >20%  2/13/2024 1119 by Sejal Pelletier, SLP  Outcome: Not Progressing     
Problem: SLP Adult - Impaired Swallowing  Goal: By Discharge: Advance to least restrictive diet without signs or symptoms of aspiration for planned discharge setting.  See evaluation for individualized goals.  Outcome: Not Progressing     
Speech LAnguage Pathology EVALUATION    Patient: Ronald Caro (53 y.o. adult)  Date: 2/9/2024  Primary Diagnosis: Cerebrovascular accident (CVA) due to occlusion of left middle cerebral artery (HCC) [I63.512]  Acute cerebrovascular accident (CVA) due to ischemia (HCC) [I63.9]       Precautions: aspiration, Fall Risk (SBP <160)                  ASSESSMENT :    Patient participated with PO trials ice chips, thin liquids via cup and straw with assist and independently. Other than x1 episode anterior spillage R side, patient with adequate containment and fairly timely perceived swallow response with no overt s/sx aspiration or penetration. PO trials pureed solids with same results however prolonged oral prep and delayed perceived initiation of propulsion with noted oral holding initially; as trials progressed, patient with improved timing and no overt s/sx aspiration or penetration observed. PO trials advanced solids resulted in very long, however complete mastication, oral holding with no effort to initiate propulsion resulting in multiple sips thins and pureed solids to clear oral cavity. Again, mild anterior spillage R affected side.     Patient with noted spontaneous utterances that were appropriate in context (e.g. cereal when solids were presented, and \"hi\") however not consistent. Intonation with all verbal expression appeared intact. Affect also appeared intact. Otherwise, patient exhibited fluent aphasia characteristics with expressive communication deemed severe-profoundly impaired, significantly impacting patient's ability to communicate even basic wants/needs. Patient followed 1-step verbal +visual directions given max multi-modality cues with 10% accuracy. Attempts with basic yes/no questions, picture naming or gesture identification, verbal repetition tasks (phonemes, CVC words), and sequential language tasks (e.g. singing happy birthday, counting, JAH) all resulted in 0% accuracy with no approximations 
Speech LAnguage Pathology TREATMENT    Patient: Ronald Caro (53 y.o. adult)  Date: 2/13/2024  Primary Diagnosis: Cerebrovascular accident (CVA) due to occlusion of left middle cerebral artery (HCC) [I63.512]  Acute cerebrovascular accident (CVA) due to ischemia (HCC) [I63.9]       Precautions: Aspiration Fall Risk (SBP <160)                  ASSESSMENT :  Based on the objective data described below, the patient presents with severe oropharyngeal dysphagia characterized by impaired bolus retrieval, delayed oral manipulation and transit and functional absent pharyngeal swallow given significantly delayed pharyngeal swallow.  Patient demonstrated congested coughing prior to and after PO trials.  Patient at high risk to aspirate and NPO remains safest course.    Patient with limited eye contact with therapist when positioned on patient's left side however no eye contact or acknowledgement when therapist on patient's right side.  Patient with no attempt to respond to yes / no questions or follow functional commands despite max cues and model.  Patient did not vocalize on commands despite multimodality cues.  Communication skills remain severely impaired and patient is unable to communicate basic wants and needs.    Patient will benefit from skilled intervention to address the above impairments.     PLAN :  Recommendations and Planned Interventions:  Diet: NPO  Oral care     Acute SLP Services: Yes, SLP will continue to follow per plan of care.    Discharge Recommendations: Continue to assess pending progress     SUBJECTIVE:   Patient did not vocalize.  Spontaneous wet cough.    OBJECTIVE:   No past medical history on file.No past surgical history on file.  Prior Level of Function/Home Situation:   Social/Functional History  Lives With: Significant other (girlfriend Esperanza Muse)  Type of Home: Apartment  Home Equipment: None  ADL Assistance: Independent  Homemaking Assistance: Independent  Homemaking 
Speech LAnguage Pathology TREATMENT    Patient: Ronald Caro (53 y.o. adult)  Date: 2/15/2024  Primary Diagnosis: Cerebrovascular accident (CVA) due to occlusion of left middle cerebral artery (HCC) [I63.512]  Acute cerebrovascular accident (CVA) due to ischemia (HCC) [I63.9]    Precautions: Aspiration, Fall Risk, SBP <160    ASSESSMENT :  Therapy targeted dysphagia and aphasia. Patient given PO trials of thin liquids. Patient became lethargic after three ice chips and further PO trials were deferred. Oral phase of swallow revealed efficient mastication and possible delayed oral transit speed. Pharyngeal phase did not suggest immediate overt s/s of aspiration although silent aspiration cannot be ruled out at bedside. Recommend NPO except ice chips when alert/accepting and following oral care. Patient continues to demonstrate oropharyngeal dysphagia warranting consideration of long-term alternative means of nutrition/hydration if congruent with goals of care. Patient also continues to exhibit global aphasia with no command following, no verbalization, and no y/n responses. He did not initiate nonverbal communication of basic wants or needs. Patient with restlessness/impulsivity and LLE was repositioned back into bed at end of session. Patient will benefit from ongoing SLP services to address dysphagia and aphasia.     Patient will benefit from skilled intervention to address the above impairments.     PLAN :  Recommendations and Planned Interventions:  Diet: NPO except ice chips  Continue non-oral medications  Continue alternative means of nutrition/hydration  Rigorous oral care 3-4 times daily via suction toothbrush    Communication: Provide visual and verbal context to improve comprehension  Anticipate basic wants and needs as patient with no functional means of expression  Do not use communication board as any patient responses will be highly unreliable    Acute SLP Services: Yes, SLP will continue to follow 
Speech LAnguage Pathology TREATMENT    Patient: Ronald Caro (53 y.o. adult)  Date: 2/19/2024  Primary Diagnosis: Cerebrovascular accident (CVA) due to occlusion of left middle cerebral artery (HCC) [I63.512]  Acute cerebrovascular accident (CVA) due to ischemia (HCC) [I63.9]    Precautions: Aspiration, Fall Risk    ASSESSMENT:  Therapy targeted swallowing and speech. PO trials of thin liquids and solids from breakfast tray given. Patient with oral deficits characterized by slowed bolus manipulation and R oral pocketing. Patient required max verbal/visual cues to implement liquid wash to clear oral residuals. One instance of potential pharyngeal deficits as per cough response with consecutive bites followed by thin liquid wash. No further overt clinical s/s of potential aspiration observed. Due to severe mixed aphasia, patient was unable to consistently follow basic commands to implement swallow strategies. Recommend PO diet downgrade to pureed and thin liquids with strict aspiration precautions and 1:1 supervision for meals. Patient with continued severe expressive and receptive language deficits. He did not demonstrate reliable y/n responses or follow single-stage commands. Patient was largely nonverbal, communicating through facial expression and gesture. He did have one instance of spontaneous verbal expression when frustrated with tray removal from bedside table. Patient will benefit from ongoing SLP services at this and next levels of care to address dysphagia and aphasia.    Patient will benefit from skilled intervention to address the above impairments.     PLAN :  Recommendations and Planned Interventions:  Diet: Puree and thin liquids  -Oral medications crushed in purees (Check with Pharmacy prior to crushing medications)  -Aspiration precautions: Upright position, small and single bites/sips, alternate bites and sips, clear R oral pocketing with liquid wash  -Oral care 2-3 times daily via suction 
Speech LAnguage Pathology TREATMENT    Patient: Ronald Caro (53 y.o. adult)  Date: 2/20/2024  Primary Diagnosis: Cerebrovascular accident (CVA) due to occlusion of left middle cerebral artery (HCC) [I63.512]  Acute cerebrovascular accident (CVA) due to ischemia (HCC) [I63.9]       Precautions: aspiration, Fall Risk, Modified Diet                  ASSESSMENT :  Based on the objective data described below, the patient presents with a decline in overall function this date with limited participation despite max encouragement. Patient required moderate oral suction to clear of pureed solids secondary to absent swallow at end of meal and despite patient alertness level. Oral care also provided following oral clearance. Patient provided multi-modality cues to independently clear oral residue however unable to initiate swallow response. Patient did not follow any directions or exhibit any vocalizations or verbalizations this session, again despite multi-modality cues and encouragement. Patient with no direction following for structured task and remained with eyes closed despite movement with music. Tx session discontinued secondary to lack of participation.    Patient will benefit from skilled intervention to address the above impairments.     PLAN :  Recommendations and Planned Interventions:  Diet: Puree and thin liquids  Must be alert and upright all meals  Assist with feeding as needed  Small bites, small sips     Acute SLP Services: Yes, SLP will continue to follow per plan of care.  Discharge Recommendations: Yes, recommend SLP treatment at next level of care     SUBJECTIVE:   Patient awake but with eyes closed, music playing in room and patient moving to rhythm however did not open eyes despite encouragement. Patient up in chair and RN student present; reports patient had limited PO intake and required total assist with feeding. Patient noted to be orally holding, anterior spillage oatmeal noted from oral 
Independent  Homemaking Assistance: Independent  Homemaking Responsibilities: Yes  Ambulation Assistance: Independent  Transfer Assistance: Independent  Active : Yes  Mode of Transportation: Car      Cognitive and Communication Status:  Neurologic State: Alert  Cognition: Impulsive     Respiratory Status/Airway:  Room air                               After treatment:   Call bell left within reach and Nursing notified    COMMUNICATION/EDUCATION:       The patient's plan of care including recommendations, planned interventions, and recommended diet changes were discussed with: Registered nurse    Patient/family have participated as able in goal setting and plan of care    Thank you,  Karen Justin SLP       Problem: SLP Adult - Impaired Swallowing  Goal: By Discharge: Advance to least restrictive diet without signs or symptoms of aspiration for planned discharge setting.  See evaluation for individualized goals.  Description: Speech Pathology Goals     1.  Patient will participate in re-evaluation of swallow physiology within 7 days. Met 2/18/24  2. Added 2/18/24 Patient will tolerate baseline diet without adverse effects within 7 days.   Outcome: Progressing     
Restraint  Goal: Remains free of injury from restraints (Restraint for Interference with Medical Device)  Description: INTERVENTIONS:  1. Determine that other, less restrictive measures have been tried or would not be effective before applying the restraint  2. Evaluate the patient's condition at the time of restraint application  3. Inform patient/family regarding the reason for restraint  4. Q2H: Monitor safety, psychosocial status, comfort, nutrition and hydration  Recent Flowsheet Documentation  Taken 2/10/2024 2000 by Farrah Jaramillo RN  Remains free of injury from restraints (restraint for interference with medical device):   Determine that other, less restrictive measures have been tried or would not be effective before applying the restraint   Every 2 hours: Monitor safety, psychosocial status, comfort, nutrition and hydration     
for balance. Poor attention to RUE/RLE with multi-modal cues. Pt LUE/LLE is very strong, frequently pulling/pushing and initiating laying back down. Pt is limited by impaired visual attention, low activity tolerance, impaired sitting balance, impaired communication, impaired cognition, impulsivity, RUE/RLE flaccidity, and overall requiring up to totalA with BADLs. Pt returned to bed with bilateral wrist restraints in place. Recommend IPR to maximize functional rehab due to prior level of function of independent and currently requiring a high level of care.       PLAN :  Patient continues to benefit from skilled intervention to address the above impairments.  Continue treatment per established plan of care to address goals.    Recommend with staff: Recommend with nursing, ADLs with assist, bed in chair position 3x/day and toileting via rolling in bed. Thank you for completing as able in order to maintain patient strength, endurance and independence.     Recommendation for discharge: (in order for the patient to meet his/her long term goals): Therapy 3 hours/day 5-7 days/week    Other factors to consider for discharge: patient's current support system is unable to meet their requirements for physical assistance, poor safety awareness, impaired cognition, high risk for falls, not safe to be alone, and concern for safely navigating or managing the home environment    IF patient discharges home will need the following DME: continuing to assess with progress       SUBJECTIVE:   Patient did not communicate or attempt to communicate during session.    OBJECTIVE DATA SUMMARY:   Cognitive/Behavioral Status:  Orientation  Orientation Level: Unable to assess  Cognition  Overall Cognitive Status: Exceptions  Arousal/Alertness: Inconsistent responses to stimuli  Attention Span: Unable to maintain attention  Safety Judgement: Decreased awareness of need for assistance;Decreased awareness of need for safety  Insights: Not aware of 
goals): Therapy 3 hours/day 5-7 days/week    Other factors to consider for discharge: poor safety awareness, impaired cognition, high risk for falls, not safe to be alone, and concern for safely navigating or managing the home environment    IF patient discharges home will need the following DME: hospital bed       SUBJECTIVE:   Patient with no verbalization    OBJECTIVE DATA SUMMARY:   Critical Behavior:      Patient received supine with telemetry, iv, bilateral restraints    Functional Mobility Training:  Bed Mobility:  Bed Mobility Training  Rolling: Maximum assistance  Supine to Sit: Maximum assistance;Assist X2  Sit to Supine: Maximum assistance;Assist X2  Scooting: Total assistance  Transfers:  Transfer Training  Transfer Training: Yes  Sit to Stand: Maximum assistance;Assist X2  Stand to Sit: Total assistance  Stand Pivot Transfers: Total assistance  Bed to Chair: Total assistance  Balance:  Balance  Sitting: Impaired  Sitting - Static: Poor (constant support)  Sitting - Dynamic: Poor (constant support)  Standing: Impaired  Standing - Static: Poor   Ambulation/Gait Training:        Neuro Re-Education:                    Pain Rating:  No indicators of pain  Pain Intervention(s):       Activity Tolerance:   Fair     After treatment:   Patient left in no apparent distress in bed, Call bell within reach, and Bed/ chair alarm activated      COMMUNICATION/EDUCATION:   The patient's plan of care was discussed with: registered nurse    Patient Education  Education Given To: Patient  Education Provided: Role of Therapy;Plan of Care  Education Method: Verbal  Barriers to Learning: Cognition  Education Outcome: Continued education needed      Jose Price, PT  Minutes: 25   
would appear \"alert\" with eyes open which was following by rapid shift to eyes closed. He is not following commands, not attempting to respond non-verbally to yes/no questions or making verbal attempts to communicate despite max cues.   At this time, recommend npo. Patient with non-functional communication with inability to express basic wants/needs. Given poor receptive language, a communication board would be highly inappropriate at this time.    Patient will benefit from skilled intervention to address the above impairments.     PLAN :  Recommendations and Planned Interventions:  Diet:  Changed to NPO. Could offer ice chip if alert and accepting, following oral care  -- slp to follow closely for dysphagia and language tx       Acute SLP Services: Yes, SLP will continue to follow per plan of care.    Discharge Recommendations: Yes, recommend SLP treatment at next level of care     SUBJECTIVE:   Alert present during session and MD came in as well.    OBJECTIVE:   No past medical history on file.No past surgical history on file.  Prior Level of Function/Home Situation:   Social/Functional History  Lives With: Significant other (girlfriend Esperanza Muse)  Type of Home: Apartment  Home Equipment: None  ADL Assistance: Independent  Homemaking Assistance: Independent  Homemaking Responsibilities: Yes  Ambulation Assistance: Independent  Transfer Assistance: Independent  Active : Yes  Mode of Transportation: Car        Cognitive and Communication Status:  Neurologic State: Alert and Confused  Orientation Level: Unable to verbalize  Cognition: Follows commands    Dysphagia:  Oral Assessment:  Oral Motor   Labial:  (no command following)  Dentition: Intact;Natural  Oral Hygiene: Dried secretions  Oral Hygiene Comments: white coating on tongue, oral care completed with suction toothbrush  Lingual:  (no command following)  Velum: Unable to visualize  P.O. Trials:  PO Trials  Neuromuscular Estim Used: No  Assessment 
SUMMARY:   Critical Behavior:  Orientation  Overall Orientation Status: Impaired  Cognition  Arousal/Alertness: Appropriate responses to stimuli;Inconsistent responses to stimuli  Following Commands: Follows one step commands with repetition;Inconsistently follows commands  Attention Span: Attends with cues to redirect;Difficulty dividing attention  Safety Judgement: Decreased awareness of need for assistance;Decreased awareness of need for safety  Problem Solving: Decreased awareness of errors;Assistance required to correct errors made  Insights: Not aware of deficits  Initiation: Requires cues for some  Sequencing: Requires cues for some    Functional Mobility Training:  Bed Mobility:  Bed Mobility Training  Bed Mobility Training: Yes  Supine to Sit: Maximum assistance;Additional time (Exiting bed to R, pulling with LUE on bed rail.)  Sit to Supine: Minimum assistance;Assist X2 (Manual cues for initiation.)  Scooting: Maximum assistance  Transfers:  Transfer Training  Transfer Training: Yes  Sit to Stand: Moderate assistance;Assist X2  Stand to Sit: Moderate assistance;Assist X2  Stand Pivot Transfers: Maximum assistance;Assist X2  Bed to Chair: Maximum assistance;Assist X2  Balance:  Balance  Sitting: Impaired  Sitting - Static: Good (unsupported);Fair (occasional)  Sitting - Dynamic: Fair (occasional)  Standing: Impaired  Standing - Static: Poor;Constant support   Ambulation/Gait Training:        Neuro Re-Education:                    Pain Intervention(s):       Activity Tolerance:   Good    After treatment:   Patient left in no apparent distress in bed, Call bell within reach, Bed/ chair alarm activated, Side rails x3, and Heels elevated for pressure relief      COMMUNICATION/EDUCATION:   The patient's plan of care was discussed with: occupational therapist and registered nurse           Tessa Galvan, PT  Minutes: 64   
care    Thank you,  Lianna Bueno, SLP  Minutes: 17    Problem: SLP Adult - Impaired Communication  Goal: By Discharge: Demonstrates communication skills at highest level of function for planned discharge setting.  See evaluation for individualized goals.  Description: 1. Patient will follow basic 1-step directions using max multi-modality cues with improved accuracy >20%. Continue for 7 days 2/26/2024   2. Patient will answer basic yes/no questions using max multi-modality cues with improved accuracy >20%. Continue for 7 days 2/26/2024   Outcome: Progressing     Problem: SLP Adult - Impaired Swallowing  Goal: By Discharge: Advance to least restrictive diet without signs or symptoms of aspiration for planned discharge setting.  See evaluation for individualized goals.  Description: Speech Pathology Goals     1.  Patient will participate in re-evaluation of swallow physiology within 7 days. Met 2/18/24  2. Added 2/18/24 Patient will tolerate baseline diet without adverse effects within 7 days. Continue for 7 days 2/26/2024   Outcome: Progressing     
for falls, not safe to be alone, and concern for safely navigating or managing the home environment    IF patient discharges home will need the following DME: continuing to assess with progress     SUBJECTIVE:   Patient nonverbal during session.    OBJECTIVE DATA SUMMARY:     Cognitive/Behavioral Status:  Orientation  Overall Orientation Status: Impaired  Orientation Level: Unable to assess  Cognition  Overall Cognitive Status: Exceptions  Following Commands: Inconsistently follows commands    Functional Mobility and Transfers for ADLs:  Bed Mobility:  Bed Mobility Training  Bed Mobility Training: Yes  Rolling: Total assistance  Supine to Sit: Maximum assistance;Assist X2  Sit to Supine: Maximum assistance;Assist X2  Scooting: Total assistance     Transfers:   Transfer Training  Transfer Training: Yes  Sit to Stand: Total assistance  Stand to Sit: Total assistance      Balance:  Standing: Impaired  Balance  Sitting: Impaired  Sitting - Static: Fair (occasional)  Sitting - Dynamic: Fair (occasional)  Standing: Impaired  Standing - Static: Poor;Constant support      ADL Intervention:    Feeding: .  - Food to Mouth: Minimal Assistance and Seated EOB  - Drink to Mouth : Minimal Assistance and Seated EOB  - Patient with improved ability to self feed but holds food in mouth and difficulty initiating swallow despite significant cueing    Grooming: .  - Face Washing : Maximal Assistance, Seated EOB, and hand over hand to facilitate            Lower Extremity Dressing: .  - Socks : Total Assistance and Bed Level                      Pain Rating:  Unable to report, no noted pain behaviors     Activity Tolerance:   Fair , requires frequent rest breaks, and SpO2 stable on room air  Please refer to the flowsheet for vital signs taken during this treatment.    After treatment:   Patient left in no apparent distress in bed, Call bell within reach, Bed/ chair alarm activated, and Side rails x3    COMMUNICATION/EDUCATION:   The 
restraint  2. Identify and document the criteria for restraint  3. Evaluate the patient's condition at the time of restraint application  4. Inform patient/family regarding the reason for restraint/seclusion  5. Q2H: Monitor comfort, nutrition and hydration needs  6. Q15M: Perform safety checks including skin, circulation, sensory, respiratory and psychological status  7. Ensure continuous observation  8. Identify and implement measures to help patient regain control, assess readiness for release and initiate progressive release per policy  2/27/2024 1845 by Janene Lopez, RN  Outcome: Completed  2/27/2024 1224 by Janene Lopez, RN  Outcome: Progressing  2/27/2024 0541 by Richa Kirk RN  Outcome: Progressing     Problem: Nutrition Deficit:  Goal: Optimize nutritional status  2/27/2024 1845 by Janene Lopez, RN  Outcome: Completed  2/27/2024 1224 by Jannee Lopez, RN  Outcome: Progressing  2/27/2024 0541 by Richa Kirk RN  Outcome: Progressing     
chair alarm activated, and Side rails x3    COMMUNICATION/EDUCATION:   The patient's plan of care was discussed with: physical therapist and registered nurse    Patient Education  Education Given To: Patient  Education Provided: Precautions;Role of Therapy;ADL Adaptive Strategies;Plan of Care  Education Method: Verbal  Barriers to Learning: Cognition;Vision  Education Outcome: Unable to demonstrate understanding;Unable to verbalize;Continued education needed    Thank you for this referral.  Stephanie Woods OT  Minutes: 24    
                                                                                                                                                                                                                                                                                                                                                                                                                                                                                                                                                                                                                                [facility] AM-PAC®      Basic Mobility Inpatient Short Form (6-Clicks) Version 2  How much HELP from another person do you currently need... (If the patient hasn't done an activity recently, how much help from another person do you think they would need if they tried?) Total A Lot A Little None   1.  Turning from your back to your side while in a flat bed without using bedrails? []  1 [x]  2 []  3  []  4   2.  Moving from lying on your back to sitting on the side of a flat bed without using bedrails? []  1 [x]  2 []  3  []  4   3.  Moving to and from a bed to a chair (including a wheelchair)? []  1 [x]  2 []  3  []  4   4. Standing up from a chair using your arms (e.g. wheelchair or bedside chair)? []  1 [x]  2 []  3  []  4   5.  Walking in hospital room? [x]  1 []  2 []  3  []  4   6.  Climbing 3-5 steps with a railing? [x]  1 []  2 []  3  []  4     Raw Score: 10/24                            Cutoff score ?171,2,3 had higher odds of discharging home with home health or need of SNF/IPR.    1. Ayla Rodriguez, Amadna Hanley, Charan Storm, Mary Pedroza, Darian Novak, Giuliano Rodriguez.  Validity of the AM-PAC “6-Clicks” Inpatient Daily Activity and Basic Mobility Short Forms. Physical Therapy Mar 2014, 94 3 [phone]; DOI: [email or web address]  2. Kaylen Zhao, Heather TURNER, Bhargavi TURNER. 
Inpatient Short Form  How much help from another person does the patient currently need... Total; A Lot A Little None   1.  Putting on and taking off regular lower body clothing? [x]  1 []  2 []  3 []  4   2.  Bathing (including washing, rinsing, drying)? [x]  1 []  2 []  3 []  4   3.  Toileting, which includes using toilet, bedpan or urinal? [x] 1 []  2 []  3 []  4   4.  Putting on and taking off regular upper body clothing? [x]  1 []  2 []  3 []  4   5.  Taking care of personal grooming such as brushing teeth? [x]  1 []  2 []  3 []  4   6.  Eating meals? [x]  1 []  2 []  3 []  4   © 2007, Trustees of [facility], under license to [employer]. All rights reserved     Score: 6/24     Interpretation of Tool:  Represents clinically-significant functional categories (i.e. Activities of daily living).    Cutoff score 39.4 (19) correlates to a good likelihood of discharging home versus a facility  Ayla Rodriguez, Amanda Hanley, Charan Storm, Mary Pedroza, Darian Novak, Giuliano Rodriguez, AM-PAC “6-Clicks” Functional Assessment Scores Predict Acute Care Hospital Discharge Destination, Physical Therapy, Volume 94, Issue 9, 1 September 2014, Pages 4990-2942, [email or web address]    Pain Rating:  Unable to verbalize   Pain Intervention(s):   repositioning    Activity Tolerance:   Fair  and signs and symptoms of orthostatic hypotension    After treatment:   Patient left in no apparent distress in bed, Call bell within reach, Bed/ chair alarm activated, and Side rails x3    COMMUNICATION/EDUCATION:   The patient's plan of care was discussed with: physical therapist and registered nurse    Patient Education  Education Given To: Patient  Education Provided: Role of Therapy;Precautions;ADL Adaptive Strategies;Transfer Training;Orientation;Fall Prevention Strategies;Low Vision Education  Education Method: Demonstration;Verbal;Teach Back  Barriers to Learning: Cognition;Vision  Education 
Outcome: Unable to demonstrate understanding;Unable to verbalize    Thank you for this referral.  Stephanie Woods, OT  Minutes: 23    
  Please refer to the flowsheet for vital signs taken during this treatment.    After treatment:   Patient left in no apparent distress in bed, Call bell within reach, and Bed/ chair alarm activated    COMMUNICATION/EDUCATION:   The patient's plan of care was discussed with: physical therapist and registered nurse    Patient Education  Education Given To: Patient  Education Provided: Role of Therapy;Plan of Care;Fall Prevention Strategies;ADL Adaptive Strategies;Transfer Training  Education Provided Comments: Attention to R side  Education Method: Demonstration;Verbal  Barriers to Learning: Cognition;Vision  Education Outcome: Unable to verbalize;Continued education needed;Unable to demonstrate understanding    Thank you for this referral.  Stephanie Espinosa, OT  Minutes: 43

## 2024-02-28 NOTE — PROGRESS NOTES
Neurology Progress Note     NAME: Ronald Caro   :  1970   MRN:  [id number]   DATE:  2024    Assessment:     Principal Problem:    Acute cerebrovascular accident (CVA) due to ischemia (HCC)  Active Problems:    Acute ischemic left middle cerebral artery (MCA) stroke (HCC)    Apical mural thrombus    Primary hypertension    Uncontrolled type 2 diabetes mellitus with hyperglycemia (HCC)    Hyperlipidemia    Cocaine abuse (HCC)    Cerebrovascular accident (CVA) due to occlusion of left middle cerebral artery (HCC)  Resolved Problems:    * No resolved hospital problems. *    Patient is a 53 year-old male with history of HTN, HLD, DM, diabetic PN, CAD with h/o MI, CVA, UDS+cocaine, home medication status unknown, admitted 24 with acute aphasia and R-sided weakness. CT head with R frontal hypoattenuation. CTA head/neck showed L temporo-parietal stroke, severe stenosis or occlusion of L M3/M4. CTP with mismatch in L temporo-parietal lobes. He is s/p thrombectomy 24 TICI 3. Repeat CT head () showed increased L edema with R midline shift and no hemorrhage. TTE EF 10-15%, severe hypokinesis and akinesis, large apical mural thrombus.  A1c 13.0, LDL 99, WBC 18.5, urine glucose >1000    Acute L MCA CVA d/t L MCA occlusion likely cardioembolic s/p emergent thrombectomy 24 TICI 3 complicated by edema and shift and poor neuro exam. Do not feel at this point, starting hypertonic saline would improve outcome.   Plan:   - Continue heparin infusion for L apical mural thrombus. Ok with starting Eliquis today from Neuro standpoint  - Neuro checks per protocol  - Continue to hold APT  - Continue atorvastatin 80 mg for goal LDL <70  - A1c not at goal, treatment per DMT and primary team   - Can now begin to normalize BP, avoiding hypotension    I updated 
                                                                                                                                                   Neurology Progress Note     NAME: Ronald Caro   :  1970   MRN:  [id number]   DATE:  2024    Assessment:     Principal Problem:    Acute cerebrovascular accident (CVA) due to ischemia (HCC)  Active Problems:    Acute ischemic left middle cerebral artery (MCA) stroke (HCC)    Apical mural thrombus    Primary hypertension    Uncontrolled type 2 diabetes mellitus with hyperglycemia (HCC)    Hyperlipidemia    Cocaine abuse (HCC)    Cerebrovascular accident (CVA) due to occlusion of left middle cerebral artery (HCC)  Resolved Problems:    * No resolved hospital problems. *    Pt is a 52yo male with h/o DM, diabetic PN, HTN, HLD, CVA, CAD with h/o MI, smoker, UDS+Cocaine, home medication status unknown, admitted 24 with acute aphasia and right sided weakness. CTH with right frontal hypoattenuation.  CTA H/N left TP CVA, severe stenosis or occlusion of left M3/M4. CTP with mismatch in left TP lobes. S/p thrombectomy. Glu 1001, Na 120, BUN/Cr 40/1.87. HgbA1C 13.7. LDL 99.  TTE with EF 10-15%, severe hypokinesis and akinesis, large apical mural thrombus.    Exam with aphasia, interacts with examiner, no commands, left gaze preference, no movement in right UE, spontaneous and purposeful movements in right LE and left side.   CTH today with increased edema with slightly increased shift, no hemorrhage.      Cardioembolic left MCA CVA, severe with significant edema  Plan:   -Continue IV heparin for left apical mural thrombus. Consider starting Eliquis on 24  -Hold APT  -Diabetes management per primary team  -Permissive HTN, treat SBP>180  -Start Lipitor 80mg daily when able to take POs  -q2 hour neuro checks  -PT/OT/ST    Subjective:   Pt is seen with RN at bedside. No family present today.  He is not eating, refusing bites when RN attempting to feed him. 
                                                                                                                                                   Neurology Progress Note     NAME: Ronald Caro   :  1970   MRN:  [id number]   DATE:  2024    Assessment:     Principal Problem:    Acute cerebrovascular accident (CVA) due to ischemia (HCC)  Active Problems:    Acute ischemic left middle cerebral artery (MCA) stroke (HCC)    Apical mural thrombus    Primary hypertension    Poorly controlled diabetes mellitus (HCC)    Hyperlipidemia    Cocaine abuse (HCC)    Cerebrovascular accident (CVA) due to occlusion of left middle cerebral artery (HCC)    Palliative care encounter    Altered mental status    Dysphagia    Goals of care, counseling/discussion  Resolved Problems:    * No resolved hospital problems. *    Patient is a 53 year-old male with history of HTN, HLD, DM, diabetic PN, CAD with h/o MI, CVA, UDS+cocaine, who was admitted 24 with acute aphasia and R-sided weakness. CT head with R frontal hypoattenuation. CTA head/neck showed L temporo-parietal stroke, severe stenosis or occlusion of L M3/M4. CTP with mismatch in L temporo-parietal lobes. He is s/p thrombectomy 24 TICI 3. Repeat CT head () showed increased L edema with R midline shift and no hemorrhage. TTE EF 10-15%, severe hypokinesis and akinesis, large apical mural thrombus.  A1c 13.0, LDL 99, WBC 18.5, urine glucose >1000    Acute L MCA CVA d/t L MCA occlusion likely cardioembolic s/p emergent thrombectomy 24 TICI 3 complicated by edema and shift and poor neuro exam.   Plan:   - Continue heparin infusion for L apical mural thrombus. Ok with starting Eliquis today from Neuro standpoint but likely patient will receive PEG tube, thus holding Eliquis for impending procedure  - Neuro checks per protocol  - Holding Plavix (for CAD per Cardiology) for likely impending PEG tube placement. Can start post procedure.   - Continue 
                       Cardiology Progress Note  2/13/2024    Admit Date: 2/8/2024  Admit Diagnosis: Cerebrovascular accident (CVA) due to occlusion of left middle cerebral artery (HCC) [I63.512]  Acute cerebrovascular accident (CVA) due to ischemia (HCC) [I63.9]  CC:  HF    Assessment/Recommendations:  Acute left MCA stroke secondary to large LV thrombus  s/p thrombectomy 2/8/24. H/o right frontal lobe stroke 7/2023  - tolerating heparin infusion. CT head stable. Plan to switch to eliquis post possible PEG    2.  CAD s/p LAD PCI 7/2023 in setting of delayed presentation for anterior STEMI   Ischemic cardiomyopathy - LVEF 25-30% during MI 7/2023. Persistent severely reduced LVEF this admission . Currently appears euvolemic  H/o pericardial effusion requiring pericardiocentesis post MI 7/2023  - antiplatelet therapy on hold per neurology team. Plan to start clopidogrel post possible PEG if family decides to pursue this  - currently NPO. continue coreg, spironolactone, and jardiance through NG tube. Start tier 1 entresto today    3. DM II  4. HTN  5. History of cocaine abuse      Thank you for this consult. We will continue to follow along.  Please contact us for any further questions or concerns.    Hospital problem list     Principal Problem:    Acute cerebrovascular accident (CVA) due to ischemia (HCC)  Active Problems:    Acute ischemic left middle cerebral artery (MCA) stroke (HCC)    Apical mural thrombus    Primary hypertension    Uncontrolled type 2 diabetes mellitus with hyperglycemia (HCC)    Hyperlipidemia    Cocaine abuse (HCC)    Cerebrovascular accident (CVA) due to occlusion of left middle cerebral artery (HCC)  Resolved Problems:    * No resolved hospital problems. *                                                        Subjective:     In restraints. Sleeping. Does not open eyes to command. GOC with family yesterday noted.     ROS: Unable to perform ROS due ot patient status    Objective:    Physical 
                       Cardiology Progress Note  2/16/2024    Admit Date: 2/8/2024  Admit Diagnosis: Cerebrovascular accident (CVA) due to occlusion of left middle cerebral artery (HCC) [I63.512]  Acute cerebrovascular accident (CVA) due to ischemia (HCC) [I63.9]  CC:  HF    Assessment/Recommendations:  Acute left MCA stroke secondary to large LV thrombus  s/p thrombectomy 2/8/24. H/o right frontal lobe stroke 7/2023  - tolerating heparin infusion. CT head stable. Plan to switch to eliquis post possible PEG    2.  CAD s/p LAD PCI 7/2023 in setting of delayed presentation for anterior STEMI   Ischemic cardiomyopathy - LVEF 25-30% during MI 7/2023. Persistent severely reduced LVEF this admission . Currently appears euvolemic  H/o pericardial effusion requiring pericardiocentesis post MI 7/2023  - antiplatelet therapy on hold. Plan to start clopidogrel post possible PEG if family decides to pursue this  - currently NPO. continue coreg, spironolactone, entresto and jardiance through NG tube. HR is labile    3. DM II  4. HTN  5. History of cocaine abuse      Thank you for this consult. Will be available as needed over the weekend. Please contact us for any further questions or concerns.    Hospital problem list     Principal Problem:    Acute cerebrovascular accident (CVA) due to ischemia (HCC)  Active Problems:    Acute ischemic left middle cerebral artery (MCA) stroke (HCC)    Apical mural thrombus    Primary hypertension    Poorly controlled diabetes mellitus (HCC)    Hyperlipidemia    Cocaine abuse (HCC)    Cerebrovascular accident (CVA) due to occlusion of left middle cerebral artery (HCC)    Palliative care encounter    Altered mental status    Dysphagia    Goals of care, counseling/discussion    Aphasia  Resolved Problems:    * No resolved hospital problems. *                                                        Subjective:     In restraints. Moving around in bed but does not open eyes or follow commands    ROS: 
                       Cardiology Progress Note  2/19/2024    Admit Date: 2/8/2024  Admit Diagnosis: Cerebrovascular accident (CVA) due to occlusion of left middle cerebral artery (HCC) [I63.512]  Acute cerebrovascular accident (CVA) due to ischemia (HCC) [I63.9]  CC:  HF    Assessment/Recommendations:  Acute left MCA stroke secondary to large LV thrombus  s/p thrombectomy 2/8/24. H/o right frontal lobe stroke 7/2023  - transitioned to eliquis . Recommend 5 mg BID - dose changes    2.  CAD s/p LAD PCI 7/2023 in setting of delayed presentation for anterior STEMI   Ischemic cardiomyopathy - LVEF 25-30% during MI 7/2023. Persistent severely reduced LVEF this admission . Currently appears euvolemic  H/o pericardial effusion requiring pericardiocentesis post MI 7/2023  - restart clopidogrel  - continue coreg, spironolactone, entresto and jardiance  - questionable compliance with medications prior to admission. Will need re-evaluation of LVEF after 3 months of GDMT and consider primary prevention ICD at the time    3. DM II  4. HTN  5. History of cocaine abuse      Thank you for this consult. Will sign off. Recommend follow up with primary caridologist Dr. Ray Palacios on discharge. Please contact us for any further questions or concerns.    Hospital problem list     Principal Problem:    Acute cerebrovascular accident (CVA) due to ischemia (HCC)  Active Problems:    Acute ischemic left middle cerebral artery (MCA) stroke (HCC)    Apical mural thrombus    Primary hypertension    Poorly controlled diabetes mellitus (HCC)    Hyperlipidemia    Cocaine abuse (HCC)    Cerebrovascular accident (CVA) due to occlusion of left middle cerebral artery (HCC)    Palliative care encounter    Altered mental status    Dysphagia    Goals of care, counseling/discussion    Aphasia  Resolved Problems:    * No resolved hospital problems. *                                                        Subjective:     Mental status much better. 
        [facility]  Hospitalist Group                                                                                          Hospitalist Progress Note  Fide Toth MD  Answering service: [phone] OR [phone] from in house phone        Date of Service:  2024  NAME:  Ronald Caro  :  1970  MRN:  [id number]       Admission Summary:     Ronald Caro is a 53 y.o. adult PMH DM, HTN, cocaine abuse, prior CVA, known cardiomyopathy who presented on  with acute weakness on right side, confusion and slurred speech while sitting in the 's seat of a parked car, EMS activated by pt's friend/passenger who was also in the car.   He arrived as level 1, VAN + code stroke. CTH showed no acute process, appearance of possible encephalomalacia in right frontal lobe. CTP showed perfusion mismatch in left tempoparietal region. CTA of head and neck revealed distal left MCA occlusion. NIHSS 18.  Given initial unknown history, pt was not given TNK. Dr. Toure notified and agreed to take pt for emergent mechanical thrombectomy on admission. UDS + cocaine on admission. Pt underwent a cerebral angiogram and emergent mechanical thrombectomy of the left M2 branch on 24.      He was admitted to the ICU s/p neurointervention and has been followed by neurointervention as well as ICU Team, and neurology since that time.  He has been seen by speech and cleared for pureed diet and thin liquids. He remains having expressive aphasia with some vocalization noted, but limited. PT has evaluated and working on bed mobility. We are consulted because patient aside for bouts of agitation has remained stable on repeated CTH, today had CTH x 2, the second due to acute agitation requiring restraints and versed, but his scan noted was UNCHANGED findings (although I do note it mentioned shift was 7mm not 6mm).     Initially when I went to see patient he seemed unresponsive, but when I went to examine his left leg 
        [facility] Adult  Hospitalist Group                                                                                          Hospitalist Progress Note  Fide Toth MD  Answering service: [phone] OR [phone] from in house phone        Date of Service:  2024  NAME:  Ronald Caro  :  1970  MRN:  [id number]       Admission Summary:     Ronald Caro is a 53 y.o. adult PMH DM, HTN, cocaine abuse, prior CVA, known cardiomyopathy who presented on  with acute weakness on right side, confusion and slurred speech while sitting in the 's seat of a parked car, EMS activated by pt's friend/passenger who was also in the car.   He arrived as level 1, VAN + code stroke. CTH showed no acute process, appearance of possible encephalomalacia in right frontal lobe. CTP showed perfusion mismatch in left tempoparietal region. CTA of head and neck revealed distal left MCA occlusion. NIHSS 18.  Given initial unknown history, pt was not given TNK. Dr. Toure notified and agreed to take pt for emergent mechanical thrombectomy on admission. UDS + cocaine on admission. Pt underwent a cerebral angiogram and emergent mechanical thrombectomy of the left M2 branch on 24.      He was admitted to the ICU s/p neurointervention and has been followed by neurointervention as well as ICU Team, and neurology since that time.  He has been seen by speech and cleared for pureed diet and thin liquids. He remains having expressive aphasia with some vocalization noted, but limited. PT has evaluated and working on bed mobility. We are consulted because patient aside for bouts of agitation has remained stable on repeated CTH, today had CTH x 2, the second due to acute agitation requiring restraints and versed, but his scan noted was UNCHANGED findings (although I do note it mentioned shift was 7mm not 6mm).     Initially when I went to see patient he seemed unresponsive, but when I went to examine his left leg 
        [facility] Adult  Hospitalist Group                                                                                          Hospitalist Progress Note  Marisa Wilson MD  Answering service: [phone] OR [phone] from in house phone        Date of Service:  2024  NAME:  Ronald Caro  :  1970  MRN:  [id number]       Admission Summary:     Ronald Caro is a 53 y.o. adult PMH DM, HTN, cocaine abuse, prior CVA, known cardiomyopathy who presented on  with acute weakness on right side, confusion and slurred speech while sitting in the 's seat of a parked car, EMS activated by pt's friend/passenger who was also in the car.   He arrived as level 1, VAN + code stroke. CTH showed no acute process, appearance of possible encephalomalacia in right frontal lobe. CTP showed perfusion mismatch in left tempoparietal region. CTA of head and neck revealed distal left MCA occlusion. NIHSS 18.  Given initial unknown history, pt was not given TNK. Dr. Toure notified and agreed to take pt for emergent mechanical thrombectomy on admission. UDS + cocaine on admission. Pt underwent a cerebral angiogram and emergent mechanical thrombectomy of the left M2 branch on 24.      He was admitted to the ICU s/p neurointervention and has been followed by neurointervention as well as ICU Team, and neurology since that time.  He has been seen by speech and cleared for pureed diet and thin liquids. He remains having expressive aphasia with some vocalization noted, but limited. PT has evaluated and working on bed mobility. We are consulted because patient aside for bouts of agitation has remained stable on repeated CTH, today had CTH x 2, the second due to acute agitation requiring restraints and versed, but his scan noted was UNCHANGED findings (although I do note it mentioned shift was 7mm not 6mm).     Initially when I went to see patient he seemed unresponsive, but when I went to examine his left 
        [facility] Adult  Hospitalist Group                                                                                          Hospitalist Progress Note  Marisa Wilson MD  Answering service: [phone] OR [phone] from in house phone        Date of Service:  2024  NAME:  Ronald Caro  :  1970  MRN:  [id number]       Admission Summary:     Ronald Caro is a 53 y.o. adult PMH DM, HTN, cocaine abuse, prior CVA, known cardiomyopathy who presented on  with acute weakness on right side, confusion and slurred speech while sitting in the 's seat of a parked car, EMS activated by pt's friend/passenger who was also in the car.   He arrived as level 1, VAN + code stroke. CTH showed no acute process, appearance of possible encephalomalacia in right frontal lobe. CTP showed perfusion mismatch in left tempoparietal region. CTA of head and neck revealed distal left MCA occlusion. NIHSS 18.  Given initial unknown history, pt was not given TNK. Dr. Toure notified and agreed to take pt for emergent mechanical thrombectomy on admission. UDS + cocaine on admission. Pt underwent a cerebral angiogram and emergent mechanical thrombectomy of the left M2 branch on 24.      He was admitted to the ICU s/p neurointervention and has been followed by neurointervention as well as ICU Team, and neurology since that time.  He has been seen by speech and cleared for pureed diet and thin liquids. He remains having expressive aphasia with some vocalization noted, but limited. PT has evaluated and working on bed mobility. We are consulted because patient aside for bouts of agitation has remained stable on repeated CTH, today had CTH x 2, the second due to acute agitation requiring restraints and versed, but his scan noted was UNCHANGED findings (although I do note it mentioned shift was 7mm not 6mm).     Initially when I went to see patient he seemed unresponsive, but when I went to examine his left 
        [facility] Adult  Hospitalist Group                                                                                          Hospitalist Progress Note  Juancarlos Pena MD  Office Phone: [phone]        Date of Service:  2024  NAME:  Ronald Caro  :  1970  MRN:  [id number]       Admission Summary:   Ronald Caro is a 53 y.o. adult PMH DM, HTN, cocaine abuse, prior CVA, known cardiomyopathy who presented on  with acute weakness on right side, confusion and slurred speech while sitting in the 's seat of a parked car, EMS activated by pt's friend/passenger who was also in the car.   He arrived as level 1, VAN + code stroke. CTH showed no acute process, appearance of possible encephalomalacia in right frontal lobe. CTP showed perfusion mismatch in left tempoparietal region. CTA of head and neck revealed distal left MCA occlusion. NIHSS 18.  Given initial unknown history, pt was not given TNK. Dr. Toure notified and agreed to take pt for emergent mechanical thrombectomy on admission. UDS + cocaine on admission. Pt underwent a cerebral angiogram and emergent mechanical thrombectomy of the left M2 branch on 24.      He was admitted to the ICU s/p neurointervention and has been followed by neurointervention as well as ICU Team, and neurology since that time.  He has been seen by speech and cleared for pureed diet and thin liquids. He remains having expressive aphasia with some vocalization noted, but limited. PT has evaluated and working on bed mobility. We are consulted because patient aside for bouts of agitation has remained stable on repeated CTH, today had CTH x 2, the second due to acute agitation requiring restraints and versed, but his scan noted was UNCHANGED findings (although I do note it mentioned shift was 7mm not 6mm).     Initially when I went to see patient he seemed unresponsive, but when I went to examine his left leg and did the Babinski, he did try to 
        [facility] Adult  Hospitalist Group                                                                                          Hospitalist Progress Note  Fide Toth MD  Answering service: [phone] OR [phone] from in house phone        Date of Service:  2024  NAME:  Ronald Caro  :  1970  MRN:  [id number]       Admission Summary:     Ronald Caro is a 53 y.o. adult PMH DM, HTN, cocaine abuse, prior CVA, known cardiomyopathy who presented on  with acute weakness on right side, confusion and slurred speech while sitting in the 's seat of a parked car, EMS activated by pt's friend/passenger who was also in the car.   He arrived as level 1, VAN + code stroke. CTH showed no acute process, appearance of possible encephalomalacia in right frontal lobe. CTP showed perfusion mismatch in left tempoparietal region. CTA of head and neck revealed distal left MCA occlusion. NIHSS 18.  Given initial unknown history, pt was not given TNK. Dr. Toure notified and agreed to take pt for emergent mechanical thrombectomy on admission. UDS + cocaine on admission. Pt underwent a cerebral angiogram and emergent mechanical thrombectomy of the left M2 branch on 24.      He was admitted to the ICU s/p neurointervention and has been followed by neurointervention as well as ICU Team, and neurology since that time.  He has been seen by speech and cleared for pureed diet and thin liquids. He remains having expressive aphasia with some vocalization noted, but limited. PT has evaluated and working on bed mobility. We are consulted because patient aside for bouts of agitation has remained stable on repeated CTH, today had CTH x 2, the second due to acute agitation requiring restraints and versed, but his scan noted was UNCHANGED findings (although I do note it mentioned shift was 7mm not 6mm).     Initially when I went to see patient he seemed unresponsive, but when I went to examine his left leg 
        [facility] Adult  Hospitalist Group                                                                                          Hospitalist Progress Note  Juancarlos Pena MD  Office Phone: [phone]        Date of Service:  2024  NAME:  Ronald Caro  :  1970  MRN:  [id number]       Admission Summary:   Ronald Caro is a 53 y.o. adult PMH DM, HTN, cocaine abuse, prior CVA, known cardiomyopathy who presented on  with acute weakness on right side, confusion and slurred speech while sitting in the 's seat of a parked car, EMS activated by pt's friend/passenger who was also in the car.   He arrived as level 1, VAN + code stroke. CTH showed no acute process, appearance of possible encephalomalacia in right frontal lobe. CTP showed perfusion mismatch in left tempoparietal region. CTA of head and neck revealed distal left MCA occlusion. NIHSS 18.  Given initial unknown history, pt was not given TNK. Dr. Toure notified and agreed to take pt for emergent mechanical thrombectomy on admission. UDS + cocaine on admission. Pt underwent a cerebral angiogram and emergent mechanical thrombectomy of the left M2 branch on 24.      He was admitted to the ICU s/p neurointervention and has been followed by neurointervention as well as ICU Team, and neurology since that time.  He has been seen by speech and cleared for pureed diet and thin liquids. He remains having expressive aphasia with some vocalization noted, but limited. PT has evaluated and working on bed mobility. We are consulted because patient aside for bouts of agitation has remained stable on repeated CTH, today had CTH x 2, the second due to acute agitation requiring restraints and versed, but his scan noted was UNCHANGED findings (although I do note it mentioned shift was 7mm not 6mm).     Initially when I went to see patient he seemed unresponsive, but when I went to examine his left leg and did the Babinski, he did try to 
        [facility]  Hospitalist Group                                                                                          Hospitalist Progress Note  Marisa Wilson MD  Answering service: [phone] OR [phone] from in house phone        Date of Service:  2024  NAME:  Ronald Caro  :  1970  MRN:  [id number]       Admission Summary:     Ronald Caro is a 53 y.o. adult PMH DM, HTN, cocaine abuse, prior CVA, known cardiomyopathy who presented on  with acute weakness on right side, confusion and slurred speech while sitting in the 's seat of a parked car, EMS activated by pt's friend/passenger who was also in the car.   He arrived as level 1, VAN + code stroke. CTH showed no acute process, appearance of possible encephalomalacia in right frontal lobe. CTP showed perfusion mismatch in left tempoparietal region. CTA of head and neck revealed distal left MCA occlusion. NIHSS 18.  Given initial unknown history, pt was not given TNK. Dr. Toure notified and agreed to take pt for emergent mechanical thrombectomy on admission. UDS + cocaine on admission. Pt underwent a cerebral angiogram and emergent mechanical thrombectomy of the left M2 branch on 24.      He was admitted to the ICU s/p neurointervention and has been followed by neurointervention as well as ICU Team, and neurology since that time.  He has been seen by speech and cleared for pureed diet and thin liquids. He remains having expressive aphasia with some vocalization noted, but limited. PT has evaluated and working on bed mobility. We are consulted because patient aside for bouts of agitation has remained stable on repeated CTH, today had CTH x 2, the second due to acute agitation requiring restraints and versed, but his scan noted was UNCHANGED findings (although I do note it mentioned shift was 7mm not 6mm).     Initially when I went to see patient he seemed unresponsive, but when I went to examine his left 
       CRITICAL CARE PROGRESS NOTE      Name: Ronald Caro   : 1970   MRN: [id number]   Date: 2/10/2024      Reason for ICU Admission: Acute Ischemic CVA     ASSESSMENT and PLAN   Acute Ischemic CVA L MCA   Acute Encephalopathy  Aphasia- Expressive/Receptive  Ataxia  - NIS / Neurology following  - s/p thrombectomy  - BP control <160  - Repeat CT Head ordered and pending, did show mass effect  - MRI Brain  - PT/OT/SLP  - ASA now no longer on his MAR, defer starting to neurology.   - Statin    Chronic Systolic CHF- EF 10-15%  CAD s/p LAD Stent - recent  L Atrial Thrombus  - Previous EF with previous stent was 20-30, new EF is 10-15%  - L apical thrombus noted at OSH Echo when he got his initial stent of the LAD, was put on eliquis it was small.  Now appears to be larger at 3cm x 1.5cm approximately  - needs heparin gtt no bolus once repeat CT showing no bleed.  My read does not show a bleed awaiting radiologist interpretation prior to starting heparin gtt with no bolus.  There is a higher risk of hemorrhagic conversino but the risk of the thrombus migrating causing further damage  - Had been on ASA now on heparin gtt only.  Defer to Cards/Neuro to resume ASA with stent  - Cardiology consulting  - assume will want to get on a Entresto +/- Farxiga/Jardiance +/- Coreg coming up.  Awaiting their full evaluation and recommendations.   - Spironolactone being started /10    Hyperosmolar Nonketotic Hyperglycemia  - insulin gtt, presently at a very low rate  - start Lantus 10units now then daily along with Medium dose sliding scale  - DC insulin gtt    Leukocytosis  - improving w/o ABX    HISTORY OF PRESENT ILLNESS / HOSPITAL COURSE:     Pt is a53 y.o M w/ unknown medical hx who presents to [facility] ED via EMS for Stroke activation. Pt was reportedly driving w/ acute development of R sided weakness and confusion around 1230. Passenger called EMS, and taken to ED. Per ED MD pt agitated on arrival w/ R sided flaccidity. 
       CRITICAL CARE PROGRESS NOTE      Name: Ronald Caro   : 1970   MRN: [id number]   Date: 2024      Reason for ICU Admission: Acute Ischemic CVA     ASSESSMENT and PLAN   Acute Ischemic CVA L MCA   Acute Encephalopathy  Aphasia- Expressive/Receptive  Ataxia  - NIS / Neurology following  - s/p thrombectomy  - BP control <160  - Repeat CT Head ordered and pending, did show mass effect  - MRI Brain  - PT/OT/SLP  - ASA  - Statin    Chronic Systolic CHF- EF 10-15%  CAD s/p LAD Stent - recent  L Atrial Thrombus  - Previous EF with previous stent was 20-30, new EF is 10-15%  - L apical thrombus noted at OSH Echo when he got his initial stent of the LAD, was put on eliquis it was small.  Now appears to be larger at 3cm x 1.5cm approximately  - needs heparin gtt no bolus once repeat CT showing no bleed.  My read does not show a bleed awaiting radiologist interpretation prior to starting heparin gtt with no bolus.  There is a higher risk of hemorrhagic conversino but the risk of the thrombus migrating causing further damage  - presently only on ASA  - Cardiology consulting  - assume will want to get on a Entresto +/- Farxiga/Jardiance +/- Coreg coming up.  Awaiting their full evaluation and recommendations.     Hyperosmolar Nonketotic Hyperglycemia  - insulin gtt  - not presently able to come off  - Lower Dextrose from 150, insulin needs escalated  - holding on transitioning to SSI and Long Acting following conversaiton with Pharmacy  - SLP evaluated felt likely needing a FEES.  Can take off IVF when cleared by speech.     ADDENDUM  Spoke with JASON regarding the read on the repeat CT Head.  Does not think this is hemorrhage at this time.  Ok to start heparin drip with no bolus using the neuro pathway.  Repeat CT Head at 6 hours and then again at 12 hours from time heparin started.  Those orders are in place.      HISTORY OF PRESENT ILLNESS / HOSPITAL COURSE:     Pt is a53 y.o M w/ unknown medical hx who 
    Bedside RN performed patient education and medication education. Discharge concerns initiated and discussed with patient, including clarification on \"who\" assists the patient at their home and instructions for when the home going patient should call their provider after discharge. Opportunity for questions and clarification was provided.      Patient receptive to education:Yes  Patient stated: acceptance  Barriers to Education: acceptance  Diagnosis Education given:  Yes    Length of stay: 19  Expected Day of Discharge: 2/27/24  Ask if they have \"Help at Home\" & add to white board?  Yes    Education Day #: 2/8/24    Medication Education Given:  Yes  M in the box Medication name: insulin    Pt aware of HCAHPS survey: Yes          Stroke Education documented in Patient Education: Yes  Core Measures Documented in Connect Care:  Risk Factors: Yes  Warning signs of stroke: Yes  When to Activate 911: Yes  Medication Education for Risk Factors: Yes  Smoking cessation if applicable: Yes  Written Education Given:  Yes    Discharge NIH Completed: Yes  Score: 19    BRAINS: Yes    Follow Up Appointment Made: No  Date/Time if applicable: n/a  
  Neurointerventional Surgery Progress Note  Sharyn Gabriel APRN-NP  Neurocritical Care NP    Admit Date: 2/8/2024   LOS: 2 days      Daily Progress Note: 2/10/2024    Assessment:     Acute Left MCA Ischemic CVA due to Left MCA occlusion, status post emergent thrombectomy 2/8/24 by Dr. Sabino Toure, TICI 3 recanalization    Plan:      - neuro checks every two hours   - CT Head stable this morning, no new hemorrhage   - continue heparin drip in the setting of LV thrombus, repeat Head CT in AM   - SBP goal < 160    - MRI of Brain to assess extent of ischemia   - lipid panel shows LDL 99, continue Lipitor 80 mg QHS   - Hgb A1C abnormal at 13, diabetes management per primary team   - PT/OT/SLP evals   - Neurology following   - Cardiology following for, EF 10-15 % with thrombus seen in Left Ventricle   - NIS signing off, please call if needed    D/w Dr. Toure, Intensivist, Dr. Torrez, and RN.     Activity: up with assistance as tolerated with PT/OT  DVT ppx: SCDs, heparin drip  Dispo: ICU        HPI: 53 y.o. Black /   adult w/ unknown PMH.  He presented to [facility] ED on 2/8/24 via EMS who found the pt in the 's seat of a parked car - they were called by the pt's friend/passenger who was also in the car and provided little information to EMS.  Per their report, pt developed acute weakness, confusion, and slurred speech at approx 1240; LKWT immediately prior.  Person on scene also reported that the pt may have had prior stroke.  He arrived as level 1, VAN + code stroke. CTH showed no acute process, appearance of possible encephalomalacia in right frontal lobe. CTP showed perfusion mismatch in left tempoparietal region. CTA of head and neck revealed distal left MCA occlusion. NIHSS 18.  D/t unknown history, pt was not given TNK. Dr. Toure notified and agreed to take pt for emergent mechanical thrombectomy.  Vitals upon admission: /93, Sp02 98%, Temp 97.3F, HR 92, RR 15. Notable labs include Na 
2003: friend of patient called for info- did not have security code. Recommended she call family to receive code for info at their discretion.     2015: Discussed lovenox order with VIRAJ Wright and VIRAJ Adler. NP will modify order to begin giving lovenox 24 hours post procedure.     0015: RN unable to complete MRI screening form as history is limited and patient's brother is unsure about most history per dayshift. NP aware.       
4 Eyes Skin Assessment     NAME:  Ronald Caro  YOB: 1970  MEDICAL RECORD NUMBER:  [id number]    The patient is being assessed for  Admission    I agree that at least one RN has performed a thorough Head to Toe Skin Assessment on the patient. ALL assessment sites listed below have been assessed.      Areas assessed by both nurses:    Shoulders, Back, Chest, Sacrum. Buttock, Coccyx, Ischium, and Legs. Feet and Heels        Does the Patient have a Wound? No noted wound(s)       Joel Prevention initiated by RN: No  Wound Care Orders initiated by RN: No    Pressure Injury (Stage 3,4, Unstageable, DTI, NWPT, and Complex wounds) if present, place Wound referral order by RN under : No    New Ostomies, if present place, Ostomy referral order under : No     Nurse 1 eSignature: Electronically signed by Janene Morales RN on 2/8/24 at 6:25 PM EST    **SHARE this note so that the co-signing nurse can place an eSignature**    Nurse 2 eSignature: Electronically signed by Fabi Leger RN on 2/8/24 at 6:29 PM EST    
Attempted to feed patient lunch and dinner this shift. Patient ate half amount of mashed potatoes on plate and about a fourth of magic cup. Patient drank most of iced tea--started coughing; reminded patient to take small sips. For dinner, patient pursing lips not wanting to eat. Able to get a few bites of magic cup and couple sips of iced tea.  
Cardiology Progress Note                                        Admit Date: 2/8/2024    Assessment/Plan:     Acute Left MCA Ischemic CVA due to Left MCA occlusion, status post emergent thrombectomy 2/8/24 by Dr. Sabino Toure, TICI 3 recanalization   CAD s/p LAD PCI 7/2023 in setting of delayed presentation for anterior STEMI   Ischemic cardiomyopathy - LVEF 25-30% during MI 7/2023. Persistent severely reduced LVEF this admission . Currently appears euvolemic  H/o pericardial effusion requiring pericardiocentesis post MI 7/2023  - antiplatelet therapy on hold per neurology team  - coreg, entresto  on hold for permissive HTN. BP goal per ICU team   DM II  HTN  History of cocaine abuse  Hx apical mural thrombus   8.  Previous hx of cva  R frontal     Hr labile    Aldactone started  will start coreg 6.25 bid today    eventual rest of GDMT       Ronald Caro is a 53 y.o. adult with      PROBLEM LIST:  Patient Active Problem List    Diagnosis Date Noted    Apical mural thrombus 02/09/2024    Primary hypertension 02/09/2024    Uncontrolled type 2 diabetes mellitus with hyperglycemia (HCC) 02/09/2024    Hyperlipidemia 02/09/2024    Cocaine abuse (HCC) 02/09/2024    Cerebrovascular accident (CVA) due to occlusion of left middle cerebral artery (HCC) 02/09/2024    Acute cerebrovascular accident (CVA) due to ischemia (HCC) 02/08/2024    Acute ischemic left middle cerebral artery (MCA) stroke (HCC) 02/08/2024    Acute stroke due to embolism of left middle cerebral artery (HCC) 02/08/2024         Subjective:     Ronald Caro does not  repond to questions  .    BP (!) 151/50   Pulse 96   Temp 98.8 °F (37.1 °C) (Axillary)   Resp 12   Ht 1.955 m (6' 4.97\")   Wt 103.6 kg (228 lb 6.3 oz)   SpO2 96%   BMI 27.11 kg/m²     Intake/Output Summary (Last 24 hours) at 2/11/2024 1012  Last data filed at 2/10/2024 2000  Gross per 24 hour   Intake 842.88 ml   Output 365 ml   Net 477.88 ml         Objective:      Physical 
Cardiology Progress Note                                        Admit Date: 2/8/2024    Assessment/Plan:     Acute Left MCA Ischemic CVA due to Left MCA occlusion, status post emergent thrombectomy 2/8/24 by Dr. Sabino Toure, TICI 3 recanalization   CAD s/p LAD PCI 7/2023 in setting of delayed presentation for anterior STEMI   Ischemic cardiomyopathy - LVEF 25-30% during MI 7/2023. Persistent severely reduced LVEF this admission . Currently appears euvolemic  H/o pericardial effusion requiring pericardiocentesis post MI 7/2023  - antiplatelet therapy on hold per neurology team  - coreg, entresto  on hold for permissive HTN. BP goal per ICU team   DM II  HTN  History of cocaine abuse  Hx apical mural thrombus   8.  Previous hx of cva  R frontal     Hr labile   Will add aldactone   eventual rest of GDMT       Ronald Caro is a 53 y.o. adult with      PROBLEM LIST:  Patient Active Problem List    Diagnosis Date Noted    Apical mural thrombus 02/09/2024    Primary hypertension 02/09/2024    Uncontrolled type 2 diabetes mellitus with hyperglycemia (HCC) 02/09/2024    Hyperlipidemia 02/09/2024    Cocaine abuse (HCC) 02/09/2024    Cerebrovascular accident (CVA) due to occlusion of left middle cerebral artery (HCC) 02/09/2024    Acute cerebrovascular accident (CVA) due to ischemia (HCC) 02/08/2024    Acute ischemic left middle cerebral artery (MCA) stroke (HCC) 02/08/2024    Acute stroke due to embolism of left middle cerebral artery (HCC) 02/08/2024         Subjective:     Ronald Caro does not  repond to questions  .    /61   Pulse 85   Temp 97.8 °F (36.6 °C) (Axillary)   Resp 16   Ht 1.955 m (6' 4.97\")   Wt 103.6 kg (228 lb 6.3 oz)   SpO2 98%   BMI 27.11 kg/m²     Intake/Output Summary (Last 24 hours) at 2/10/2024 1236  Last data filed at 2/10/2024 1200  Gross per 24 hour   Intake 2905.99 ml   Output 200 ml   Net 2705.99 ml       Objective:      Physical Exam:  HEENT: Perrla, EOMI  Neck: No JVD,  No 
Cards  Echo very abnormal  Results called to ICU attending and we will see for consult this am    02/08/24    ECHO (TTE) LIMITED      Left Ventricle: Severely reduced left ventricular systolic function with a visually estimated EF of 10 -15%. Severe hypokinesis of the following segments: mid anterior, mid anterolateral, mid anteroseptal, mid inferior, mid inferolateral and mid inferoseptal. Akinesis of the following segments: apical anterior, apical lateral, apical septal and apical inferior. Akinesis of the apex. There is a 38 x 15 mm large mass present that is fixed, echogenic and mural in the apex consistent with thrombus.    Mitral Valve: Moderate annular calcification of the mitral valve.  Signed by: Deyanira Barboza MD on 2/9/2024 10:44 AM      
Comprehensive Nutrition Assessment    Type and Reason for Visit: Initial    Nutrition Recommendations/Plan:   Initiate feedings of Glucerna 1.5 @ 20 ml/hr, flush with 100 ml free water Q 4 hrs       - Advance by 10 mls Q 6 hrs       - Goal: Glucerna 1.5 @ 60 ml/hr, flush with 100 ml free water Q 4 hrs    2.  No BM listed since PTA. If no BM within 48 hrs of enteral nutrition, add bowel regimen.     3. Diet per SLP assessment.        Malnutrition Assessment:  Malnutrition Status:  At risk for malnutrition (Comment) (02/13/24 1014)    Context:  Acute Illness     Findings of the 6 clinical characteristics of malnutrition:  Energy Intake:  Mild decrease in energy intake (Comment)  Weight Loss:  No significant weight loss     Body Fat Loss:  No significant body fat loss     Muscle Mass Loss:  No significant muscle mass loss    Fluid Accumulation:  Mild Extremities   Strength:  Not Performed       Nutrition Assessment:    Pt admitted for R-sided weakness, confusion.  CT indicative of L MCA occlusion, transferred to [facility] for thrombectomy. PMH DM, HTN, CAD with h/o MI, UDS + cocaine.    Discussed in IDRs, pt made NPO per SLP eval yesterday. DHT placed, MD cleared for use during IDRs. No family present at time of visit, appears overall well nourished. A1c 13 indicative of very poor blood glucose control. Will initiate enteral feedings and follow progress.     Goal tube feedings to provide 1980 kcals, 109 g protein, with 176 g CHO, 1600 ml free fluid, and 1920 ml total volume.     Nutritionally Significant Medications:  Jardiance (held), lantus, aldactone  Heparin drip    Estimated Daily Nutrient Needs:  Energy Requirements Based On: Kcal/kg  Weight Used for Energy Requirements: Current  Energy (kcal/day): 9054-6572 (20-22 kcal/kg)  Weight Used for Protein Requirements: Current  Protein (g/day):  (0.9-1 g/kg)  Method Used for Fluid Requirements: 1 ml/kcal  Fluid (ml/day): 2200    Nutrition Related Findings:   Edema: 
Comprehensive Nutrition Assessment    Type and Reason for Visit: Reassess    Nutrition Recommendations/Plan:   Continue diet per SLP recommendations, encourage PO/ONS intakes.       Malnutrition Assessment:  Malnutrition Status:  At risk for malnutrition (Comment) (02/13/24 1014)    Context:  Acute Illness     Findings of the 6 clinical characteristics of malnutrition:  Energy Intake:  Mild decrease in energy intake (Comment)  Weight Loss:  No significant weight loss     Body Fat Loss:  No significant body fat loss     Muscle Mass Loss:  No significant muscle mass loss    Fluid Accumulation:  Mild Extremities   Strength:  Not Performed       Nutrition Assessment:    Pt admitted for R-sided weakness, confusion.  CT indicative of L MCA occlusion, transferred to [facility] for thrombectomy. PMH DM, HTN, CAD with h/o MI, UDS + cocaine.    2/27: Discussed in IDRs and with RN. Pt intakes improved over the last few days, RN reports having eaten well for breakfast this morning. P2P this afternoon for IPR. SLP re-evaluated yesterday and recommendations to continue pureeds/thin. Will continue ONS as ordered. Weight actually up ~6# since last RD assessment, via bedscale weights.     2/22: Discussed in IDRs and with RN. Remains on pureed diet, requiring assistance/encouragement with meals. Consumed his Ensure Compact this morning, but did not eat much else of meal tray. Certainly not meeting nutrition needs, but difficult to ascertain true intakes as some notes state eating about 50%. RN suggested magic cup ONS - have ordered BID. Encouraged continued assistance with meals.      Weight down ~6# since initial RD assessment -edema also improved since initial assessment. Will monitor trend.        2/20: Discussed in IDRs. Pt started oral diet 2/18, DHT removed. Discussed with SLP and RN yesterday, felt pt motivated to eat but somewhat compulsive. Also demonstrating some pocketing of foods so pureed texture ordered (vs soft and bite 
Comprehensive Nutrition Assessment    Type and Reason for Visit: Reassess    Nutrition Recommendations/Plan:   Ensure Compact, Magic Cup BID - please encourage/assist in PO meal and ONS intakes. If 100% ONS consumed, will provide 800 kcals, 27 g protein.  Nursing, please record percent meal intakes in I/O Flowsheet.       Malnutrition Assessment:  Malnutrition Status:  At risk for malnutrition (Comment) (02/13/24 1014)    Context:  Acute Illness     Findings of the 6 clinical characteristics of malnutrition:  Energy Intake:  Mild decrease in energy intake (Comment)  Weight Loss:  No significant weight loss     Body Fat Loss:  No significant body fat loss     Muscle Mass Loss:  No significant muscle mass loss    Fluid Accumulation:  Mild Extremities   Strength:  Not Performed       Nutrition Assessment:    Pt admitted for R-sided weakness, confusion.  CT indicative of L MCA occlusion, transferred to [facility] for thrombectomy. PMH DM, HTN, CAD with h/o MI, UDS + cocaine.    2/22: Discussed in IDRs and with RN. Remains on pureed diet, requiring assistance/encouragement with meals. Consumed his Ensure Compact this morning, but did not eat much else of meal tray. Certainly not meeting nutrition needs, but difficult to ascertain true intakes as some notes state eating about 50%. RN suggested magic cup ONS - have ordered BID. Encouraged continued assistance with meals.     Weight down ~6# since initial RD assessment -edema also improved since initial assessment. Will monitor trend.      2/20: Discussed in IDRs. Pt started oral diet 2/18, DHT removed. Discussed with SLP and RN yesterday, felt pt motivated to eat but somewhat compulsive. Also demonstrating some pocketing of foods so pureed texture ordered (vs soft and bite sized he was originally ordered.) Pt a bit more lethargic today, for repeat head CT. Will add diet appropriate ONS as additional kcal/protein option for him      2/16: Remains unsafe for PO. Discussed with 
Duplicate charts , looking at his other chart he is followed regularly by Dr Palacios at [facility] with our colleagues at [facility] group, will defer consult to their service, will notify nurse.  
I have reviewed, edited, and agree with the documentation of Za More, Student RN as her preceptor.   
Lovenox Monitoring  Indication: DVT Prophylaxis  Recent Labs     02/08/24  1347   HGB 15.4      INR 1.0     Current Weight: 102.8 kg  Est. CrCl = >30 ml/min  Current Dose: 40 mg subcutaneously every 24 hours.  Plan: Change to 30 mg bid for weight 101-150.9 kg        
NUTRITION brief    Recommendations:   Continue tube feeding as ordered - Glucerna 1.5 @ 60 ml/hr + 100 ml free water Q 4 hrs       Diet: NPO  Nutrition Support: Glucerna 1.5 @ 60 ml/hr + 100 ml free water Q 4 hrs  Nutrition-related meds: Jardiance, NPH, Aldactone    Remains unsafe for PO. Discussed with RN, tolerating tube feedings. Note and appreciate palliative involvement; saw meeting was planned for yesterday - awaiting further plans related to GOC as likely to require PEG placement for longer-term nutrition needs.     Tube feedings providing 1980 kcals, 109 g protein, with 176 g CHO, 1600 ml free fluid, and 1920 ml total volume (based on 22-hr infusion).    Recent Labs     02/14/24  0009 02/16/24  0329 02/16/24  0336   BUN 29* 32*  --     142  --    K 3.9 4.1  --     111*  --    CO2 26 27  --    PHOS  --   --  3.6   MG  --   --  2.4         See full RD assessment from 2/13 for additional details, goals, and monitoring/evaluation.   Estimated Nutrition Needs:   Energy Requirements Based On: Kcal/kg  Weight Used for Energy Requirements: Current  Energy (kcal/day): 1619-8936 (20-22 kcal/kg)  Weight Used for Protein Requirements: Current  Protein (g/day):  (0.9-1 g/kg)  Method Used for Fluid Requirements: 1 ml/kcal  Fluid (ml/day): 2200    Flores Lopez, JERI   Contact via Perfect Serve or ext [phone]  
NUTRITION brief    Recommendations:   Diet per SLP recommendations, add ONS TID.  Trend K+, borderline low on AM labs. Replete PRN.     Diet: Pureed  Nutrition Support: Discontinued - DHT removed 2/18  Nutrition-related meds: IV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 changes   [x]  no new ECG provided for review    No results for input(s): \"PH\", \"PCO2\", \"PO2\" in the last 72 hours.  No results for input(s): \"CPK\", \"CKMB\", \"TROPONINI\" in the last 72 hours.  Recent Labs     24  0027 24  0718 24  0009     --  138   K 3.8  --  3.9   *  --  107   CO2 26  --  26   BUN 28*  --  29*   WBC  --  10.3 12.3*   HGB  --  10.0* 15.7   HCT  --  39.1 46.5   PLT  --  162 261     Recent Labs     24  0027 24  0009   GLOB 4.3* 4.5*     No results for input(s): \"INR\", \"APTT\" in the last 72 hours.    Invalid input(s): \"PTP\"     No results for input(s): \"TIBC\", \"FERR\" in the 
reviewed, overall VSSAF  Temp (24hrs), Av.8 °F (36.6 °C), Min:97.4 °F (36.3 °C), Max:98.3 °F (36.8 °C)    Patient Vitals for the past 8 hrs:   Pulse   24 0626 80   24 0600 (!) 108   24 0400 80   24 0200 96    Patient Vitals for the past 8 hrs:   Resp   24 0626 14   24 0200 15    Patient Vitals for the past 8 hrs:   BP   24 0626 (!) 140/114   24 0200 (!) 167/106          Intake/Output Summary (Last 24 hours) at 2024 0814  Last data filed at 2024 1713  Gross per 24 hour   Intake --   Output 300 ml   Net -300 ml       General: alert but unable to follow commands, in restraints  HEENT: sclera anicteric, moist mucous membranes  Neck: supple, no JVD   CV: regular rate and rhythm,  no murmurs, rubs or gallops,   Lungs: no respiratory distress, lungs clear to auscultation   Abdomen:  soft, non distended,   MSK: no lower extremity edema  Skin: warm to touch  Neuro: alert but unable to follow commands  Psych: calm    Data Review:  Lab results reviewed as noted below.                             Current medications reviewed as noted below.    Telemetry independently reviewed : [x]  sinus  labile HR []  chronic afib     []  par afib    []  NSVT    ECG independently reviewed: []  NSR    []  no significant changes   [x]  no new ECG provided for review    No results for input(s): \"PH\", \"PCO2\", \"PO2\" in the last 72 hours.  No results for input(s): \"CPK\", \"CKMB\", \"TROPONINI\" in the last 72 hours.  Recent Labs     24  0549 24  0238 24  0027   NA  --  138 142   K  --  4.1 3.8   CL  --  106 109*   CO2  --  27 26   BUN  --  24* 28*   WBC 14.0* 13.7*  --    HGB 15.1 15.0  --    HCT 45.8 44.5  --     230  --      Recent Labs     24  0238 24  0027   GLOB 4.1* 4.3*     No results for input(s): \"INR\", \"APTT\" in the last 72 hours.    Invalid input(s): \"PTP\"     No results for input(s): \"TIBC\", \"FERR\" in the last 72 hours.    Invalid input(s): 
finger stick glucose   -Consistent carb diet, hypoglycemia protocol.      Agitation/restraints  - on seroquel  -seen by alethea Byrne     Cocaine use:  - counseling once not confused     Full code, high risk for decompensation    CRITICAL CARE ATTESTATION:  I had a face to face encounter with the patient, reviewed and interpreted patient data including clinical events, labs, images, vital signs, I/O's, and examined patient.  I have discussed the case and the plan and management of the patient's care with the consulting services, the bedside nurses and necessary ancillary providers.       NOTE OF PERSONAL INVOLVEMENT IN CARE   This patient has a high probability of imminent, clinically significant deterioration, which requires the highest level of preparedness to intervene urgently. I participated in the decision-making and personally managed or directed the management of the following life and organ supporting interventions that required my frequent assessment to treat or prevent imminent deterioration.     I personally spent 45 minutes of critical care time.  This is time spent at this critically ill patient's bedside actively involved in patient care as well as the coordination of care and discussions with the patient's family.  This does not include any procedural time which has been billed separately.          Code status: Full Code   Prophylaxis: Heparin  Care Plan discussed with: Patient, Nurse and CM  Anticipated Disposition: Home > 48 hrs  Inpatient  Cardiac monitoring: Telemetry     Principal Problem:    Acute cerebrovascular accident (CVA) due to ischemia (HCC)  Active Problems:    Acute ischemic left middle cerebral artery (MCA) stroke (HCC)    Apical mural thrombus    Primary hypertension    Poorly controlled diabetes mellitus (HCC)    Hyperlipidemia    Cocaine abuse (HCC)    Cerebrovascular accident (CVA) due to occlusion of left middle cerebral artery (HCC)    Palliative care encounter    Altered 
since prior progress note. Most recent are:  Vitals:    02/20/24 1000   BP:    Pulse: 98   Resp:    Temp:    SpO2:        No intake or output data in the 24 hours ending 02/20/24 1101       Physical Examination:     I had a face to face encounter with this patient and independently examined them on 2/20/2024 as outlined below:          General : Lethargic, wakeful to touch, aphasic, no acute distress,   HEENT: PEERL, EOMI, moist mucus membrane, TM clear  Neck: supple, no JVD, no meningeal signs  Chest: Clear to auscultation bilaterally   CVS: S1 S2 heard, Capillary refill less than 2 seconds  Abd: soft/ non tender, non distended, BS physiological,   Ext: no clubbing, no cyanosis, no edema, brisk 2+ DP pulses  Neuro/Psych: lethargic, aphasic, right sided weakness, moves left extremities, doesn't follow command  Skin: warm     Data Review:    Review and/or order of clinical lab test  Review and/or order of tests in the radiology section of CPT  Review and/or order of tests in the medicine section of CPT      I have personally and independently reviewed all pertinent labs, diagnostic studies, imaging, and have provided independent interpretation of the same.     Labs:     Recent Labs     02/19/24 0539 02/20/24 0228   WBC 11.2* 11.9*   HGB 13.2 14.1   HCT 41.1 42.2    307       Recent Labs     02/18/24  0547 02/19/24  0539 02/20/24 0228    135* 136   K 4.5 3.9 3.4*   * 101 106   CO2 28 26 27   BUN 29* 21* 16       Recent Labs     02/18/24  0547 02/19/24  0539 02/20/24 0228   ALT 46 58 86*   GLOB 3.8 4.0 4.2*       No results for input(s): \"INR\", \"APTT\" in the last 72 hours.    Invalid input(s): \"PTP\"   No results for input(s): \"TIBC\", \"FERR\" in the last 72 hours.    Invalid input(s): \"FE\", \"PSAT\"   No results found for: \"FOL\", \"RBCF\"   No results for input(s): \"PH\", \"PCO2\", \"PO2\" in the last 72 hours.  No results for input(s): \"CPK\" in the last 72 hours.    Invalid input(s): \"CPKMB\", \"CKNDX\", 
  WBC 9.1 10.4   HGB 14.9 13.9   HCT 43.9 43.7    302       Recent Labs     02/16/24 0329 02/16/24  0336 02/17/24  0256     --  142   K 4.1  --  4.1   *  --  109*   CO2 27  --  28   BUN 32*  --  32*   MG  --  2.4  --    PHOS  --  3.6  --        Recent Labs     02/16/24 0329 02/17/24  0256   ALT 34 38   GLOB 3.6 3.7       No results for input(s): \"INR\", \"APTT\" in the last 72 hours.    Invalid input(s): \"PTP\"   No results for input(s): \"TIBC\", \"FERR\" in the last 72 hours.    Invalid input(s): \"FE\", \"PSAT\"   No results found for: \"FOL\", \"RBCF\"   No results for input(s): \"PH\", \"PCO2\", \"PO2\" in the last 72 hours.  No results for input(s): \"CPK\" in the last 72 hours.    Invalid input(s): \"CPKMB\", \"CKNDX\", \"TROIQ\"  Lab Results   Component Value Date/Time    CHOL 171 02/09/2024 05:37 AM    HDL 49 02/09/2024 05:37 AM     No results found for: \"GLUCPOC\"  [unfilled]    Notes reviewed from all clinical/nonclinical/nursing services involved in patient's clinical care. Care coordination discussions were held with appropriate clinical/nonclinical/ nursing providers based on care coordination needs.         Patients current active Medications were reviewed, considered, added and adjusted based on the clinical condition today.      Home Medications were reconciled to the best of my ability given all available resources at the time of admission. Route is PO if not otherwise noted.      Admission Status:[id number]:::1}      Medications Reviewed:     Current Facility-Administered Medications   Medication Dose Route Frequency    sacubitril-valsartan (ENTRESTO) 24-26 MG per tablet 1 tablet  1 tablet Oral BID    QUEtiapine (SEROQUEL) tablet 25 mg  25 mg Per NG tube TID    insulin NPH (HumuLIN N;NovoLIN N) injection vial 35 Units  35 Units SubCUTAneous Q12H    diazePAM (VALIUM) injection 2 mg  2 mg IntraVENous Q4H PRN    insulin lispro (HUMALOG) injection vial 0-8 Units  0-8 Units SubCUTAneous Q6H    midazolam PF (VERSED) 
SubCUTAneous Nightly    insulin glargine (LANTUS) injection vial 15 Units  15 Units SubCUTAneous Daily    heparin 25,000 units in dextrose 5% 250 mL (premix) infusion  5-30 Units/kg/hr IntraVENous Continuous    iopamidol (ISOVUE-370) 76 % injection 100 mL  100 mL IntraVENous ONCE PRN    glucose chewable tablet 16 g  4 tablet Oral PRN    dextrose bolus 10% 125 mL  125 mL IntraVENous PRN    Or    dextrose bolus 10% 250 mL  250 mL IntraVENous PRN    glucagon injection 1 mg  1 mg SubCUTAneous PRN    dextrose 10 % infusion   IntraVENous Continuous PRN    sodium chloride flush 0.9 % injection 5-40 mL  5-40 mL IntraVENous 2 times per day    sodium chloride flush 0.9 % injection 5-40 mL  5-40 mL IntraVENous PRN    0.9 % sodium chloride infusion   IntraVENous PRN    ondansetron (ZOFRAN-ODT) disintegrating tablet 4 mg  4 mg Oral Q8H PRN    Or    ondansetron (ZOFRAN) injection 4 mg  4 mg IntraVENous Q6H PRN    polyethylene glycol (GLYCOLAX) packet 17 g  17 g Oral Daily PRN    atorvastatin (LIPITOR) tablet 80 mg  80 mg Oral Nightly     ______________________________________________________________________  EXPECTED LENGTH OF STAY: 7  ACTUAL LENGTH OF STAY:          3                 Noble Boles MD     
available resources at the time of admission. Route is PO if not otherwise noted.      Admission Status:[id number]:::1}      Medications Reviewed:     Current Facility-Administered Medications   Medication Dose Route Frequency    insulin NPH (HumuLIN N;NovoLIN N) injection vial 20 Units  20 Units SubCUTAneous Q12H    melatonin tablet 6 mg  6 mg Oral QHS    clopidogrel (PLAVIX) tablet 75 mg  75 mg Oral Daily    apixaban (ELIQUIS) tablet 5 mg  5 mg Oral BID    insulin lispro (HUMALOG) injection vial 0-8 Units  0-8 Units SubCUTAneous 4x Daily AC & HS    pantoprazole (PROTONIX) 40 mg in sodium chloride (PF) 0.9 % 10 mL injection  40 mg IntraVENous Q12H    sacubitril-valsartan (ENTRESTO) 24-26 MG per tablet 1 tablet  1 tablet Oral BID    diazePAM (VALIUM) injection 2 mg  2 mg IntraVENous Q4H PRN    midazolam PF (VERSED) injection 1 mg  1 mg IntraVENous Once    carvedilol (COREG) tablet 6.25 mg  6.25 mg Oral BID WC    empagliflozin (JARDIANCE) tablet 10 mg  10 mg Oral Daily    spironolactone (ALDACTONE) tablet 25 mg  25 mg Oral Daily    iopamidol (ISOVUE-370) 76 % injection 100 mL  100 mL IntraVENous ONCE PRN    glucose chewable tablet 16 g  4 tablet Oral PRN    dextrose bolus 10% 125 mL  125 mL IntraVENous PRN    Or    dextrose bolus 10% 250 mL  250 mL IntraVENous PRN    glucagon injection 1 mg  1 mg SubCUTAneous PRN    dextrose 10 % infusion   IntraVENous Continuous PRN    sodium chloride flush 0.9 % injection 5-40 mL  5-40 mL IntraVENous PRN    0.9 % sodium chloride infusion   IntraVENous PRN    ondansetron (ZOFRAN-ODT) disintegrating tablet 4 mg  4 mg Oral Q8H PRN    Or    ondansetron (ZOFRAN) injection 4 mg  4 mg IntraVENous Q6H PRN    polyethylene glycol (GLYCOLAX) packet 17 g  17 g Oral Daily PRN    atorvastatin (LIPITOR) tablet 80 mg  80 mg Oral Nightly     ______________________________________________________________________  EXPECTED LENGTH OF STAY: Unable to retrieve estimated LOS  ACTUAL LENGTH OF STAY:         
if not otherwise noted.      Admission Status:[id number]:::1}      Medications Reviewed:     Current Facility-Administered Medications   Medication Dose Route Frequency    pantoprazole (PROTONIX) tablet 40 mg  40 mg Oral QAM AC    melatonin tablet 6 mg  6 mg Oral QHS    clopidogrel (PLAVIX) tablet 75 mg  75 mg Oral Daily    apixaban (ELIQUIS) tablet 5 mg  5 mg Oral BID    insulin lispro (HUMALOG) injection vial 0-8 Units  0-8 Units SubCUTAneous 4x Daily AC & HS    sacubitril-valsartan (ENTRESTO) 24-26 MG per tablet 1 tablet  1 tablet Oral BID    diazePAM (VALIUM) injection 2 mg  2 mg IntraVENous Q4H PRN    midazolam PF (VERSED) injection 1 mg  1 mg IntraVENous Once    carvedilol (COREG) tablet 6.25 mg  6.25 mg Oral BID WC    empagliflozin (JARDIANCE) tablet 10 mg  10 mg Oral Daily    spironolactone (ALDACTONE) tablet 25 mg  25 mg Oral Daily    iopamidol (ISOVUE-370) 76 % injection 100 mL  100 mL IntraVENous ONCE PRN    glucose chewable tablet 16 g  4 tablet Oral PRN    dextrose bolus 10% 125 mL  125 mL IntraVENous PRN    Or    dextrose bolus 10% 250 mL  250 mL IntraVENous PRN    glucagon injection 1 mg  1 mg SubCUTAneous PRN    dextrose 10 % infusion   IntraVENous Continuous PRN    sodium chloride flush 0.9 % injection 5-40 mL  5-40 mL IntraVENous PRN    0.9 % sodium chloride infusion   IntraVENous PRN    ondansetron (ZOFRAN-ODT) disintegrating tablet 4 mg  4 mg Oral Q8H PRN    Or    ondansetron (ZOFRAN) injection 4 mg  4 mg IntraVENous Q6H PRN    polyethylene glycol (GLYCOLAX) packet 17 g  17 g Oral Daily PRN    atorvastatin (LIPITOR) tablet 80 mg  80 mg Oral Nightly     ______________________________________________________________________  EXPECTED LENGTH OF STAY: Unable to retrieve estimated LOS  ACTUAL LENGTH OF STAY:          16                 Fide Toth MD   
PRN    dextrose 10 % infusion   IntraVENous Continuous PRN    sodium chloride flush 0.9 % injection 5-40 mL  5-40 mL IntraVENous 2 times per day    sodium chloride flush 0.9 % injection 5-40 mL  5-40 mL IntraVENous PRN    0.9 % sodium chloride infusion   IntraVENous PRN    ondansetron (ZOFRAN-ODT) disintegrating tablet 4 mg  4 mg Oral Q8H PRN    Or    ondansetron (ZOFRAN) injection 4 mg  4 mg IntraVENous Q6H PRN    polyethylene glycol (GLYCOLAX) packet 17 g  17 g Oral Daily PRN    atorvastatin (LIPITOR) tablet 80 mg  80 mg Oral Nightly     ______________________________________________________________________  EXPECTED LENGTH OF STAY: 8  ACTUAL LENGTH OF STAY:          4                 Juancarlos Pena MD     
Time: 02/08/24  1:47 PM   Result Value Ref Range    Troponin, High Sensitivity 71 0 - 76 ng/L   Hemoglobin A1c    Collection Time: 02/08/24  1:47 PM   Result Value Ref Range    Hemoglobin A1C 13.6 (H) 4.0 - 5.6 %    Estimated Avg Glucose 344 mg/dL   POCT Glucose    Collection Time: 02/08/24  1:54 PM   Result Value Ref Range    POC Glucose >600 (HH) 65 - 117 mg/dL    Performed by: [employer]    POCT Glucose    Collection Time: 02/08/24  1:55 PM   Result Value Ref Range    POC Glucose >600 (HH) 65 - 117 mg/dL    Performed by: [employer]    POCT Blood Gas & Electrolytes    Collection Time: 02/08/24  2:01 PM   Result Value Ref Range    PH, VENOUS (POC) 7.37 7.32 - 7.42      PCO2, [place], POC 46.0 41 - 51 MMHG    PO2, VENOUS (POC) 39 25 - 40 mmHg    HCO3, Arterial 26 mmol/L    Base Excess 0.2 mmol/L    POC O2 SAT 71 %    POC Sodium 123 (L) 136 - 145 MMOL/L    POC Potassium 5.9 (H) 3.5 - 5.5 MMOL/L    POC Chloride 90 (L) 100 - 108 MMOL/L    POC TCO2 26 (H) 19 - 24 MMOL/L    Anion Gap, POC 7 (L) 10 - 20      POC Glucose >700 (HH) 74 - 106 MG/DL    POC Creatinine 1.2 0.6 - 1.3 MG/DL    eGFR, POC INVALID >60 ml/min/1.73m2    POC Ionized Calcium 1.15 1.12 - 1.32 mmol/L    POC Lactic Acid 2.61 (HH) 0.40 - 2.00 mmol/L    Source VENOUS BLOOD      Critical Value Read Back [place]    EKG 12 Lead    Collection Time: 02/08/24  2:26 PM   Result Value Ref Range    Ventricular Rate 89 BPM    Atrial Rate 89 BPM    P-R Interval 172 ms    QRS Duration 100 ms    Q-T Interval 380 ms    QTc Calculation (Bazett) 462 ms    P Axis 72 degrees    R Axis 45 degrees    T Axis 88 degrees    Diagnosis       Normal sinus rhythm  Anteroseptal infarct , age undetermined  Abnormal ECG  No previous ECGs available  Confirmed by Deyanira Barboza MD. ([id number]) on 2/8/2024 9:51:24 PM     POCT Glucose    Collection Time: 02/08/24  3:48 PM   Result Value Ref Range    POC Glucose >600 (HH) 65 - 117 mg/dL    Performed by: REMA PINOT 
carvedilol (COREG) tablet 6.25 mg  6.25 mg Oral BID WC    empagliflozin (JARDIANCE) tablet 10 mg  10 mg Oral Daily    spironolactone (ALDACTONE) tablet 25 mg  25 mg Oral Daily    heparin 25,000 units in dextrose 5% 250 mL (premix) infusion  5-30 Units/kg/hr IntraVENous Continuous    iopamidol (ISOVUE-370) 76 % injection 100 mL  100 mL IntraVENous ONCE PRN    glucose chewable tablet 16 g  4 tablet Oral PRN    dextrose bolus 10% 125 mL  125 mL IntraVENous PRN    Or    dextrose bolus 10% 250 mL  250 mL IntraVENous PRN    glucagon injection 1 mg  1 mg SubCUTAneous PRN    dextrose 10 % infusion   IntraVENous Continuous PRN    sodium chloride flush 0.9 % injection 5-40 mL  5-40 mL IntraVENous 2 times per day    sodium chloride flush 0.9 % injection 5-40 mL  5-40 mL IntraVENous PRN    0.9 % sodium chloride infusion   IntraVENous PRN    ondansetron (ZOFRAN-ODT) disintegrating tablet 4 mg  4 mg Oral Q8H PRN    Or    ondansetron (ZOFRAN) injection 4 mg  4 mg IntraVENous Q6H PRN    polyethylene glycol (GLYCOLAX) packet 17 g  17 g Oral Daily PRN    atorvastatin (LIPITOR) tablet 80 mg  80 mg Oral Nightly     ______________________________________________________________________  EXPECTED LENGTH OF STAY: Unable to retrieve estimated LOS  ACTUAL LENGTH OF STAY:          7                 Marisa Wilson MD

## 2024-03-14 ENCOUNTER — TELEPHONE (OUTPATIENT)
Facility: HOSPITAL | Age: 54
End: 2024-03-14

## 2024-03-14 NOTE — TELEPHONE ENCOUNTER
Pt needs a hospital follow up appointment  Provider: Any   Location: TBD  In person or virtual: TBD  When: 4-6 weeks from now  Diagnosis/reason for follow up:  Acute stroke due to occlusion s/p thrombectomy   Additional information: Palliative was consulted and patient was likely going to receive PEG tube; family discussions were ongoing about plan, unsure outcome

## 2024-03-22 NOTE — TELEPHONE ENCOUNTER
Called the patient and his brother Henry picked up. He stated that the patient is currently at [facility]. Mr. Caro stated that his brother is currently there under long term care and he is still trying to figure everything out.   They have not given him a discharge date and he wishes to move the patient out of the rehab center.    Patient was rather confused on why we were calling for his brother, he states that once he gets his brothers situation together, he will give us a call back and get a hosp f/u apt scheduled.   I gave the patient our number and thanked him for his patients and time.

## 2024-08-27 ENCOUNTER — TELEMEDICINE (OUTPATIENT)
Age: 54
End: 2024-08-27
Payer: COMMERCIAL

## 2024-08-27 DIAGNOSIS — Z86.73 HISTORY OF ISCHEMIC LEFT MCA STROKE: Primary | ICD-10-CM

## 2024-08-27 DIAGNOSIS — R56.9 SEIZURE AS LATE EFFECT OF CEREBROVASCULAR ACCIDENT (CVA) (HCC): ICD-10-CM

## 2024-08-27 DIAGNOSIS — I69.398 SEIZURE AS LATE EFFECT OF CEREBROVASCULAR ACCIDENT (CVA) (HCC): ICD-10-CM

## 2024-08-27 DIAGNOSIS — I51.3 APICAL MURAL THROMBUS: ICD-10-CM

## 2024-08-27 PROCEDURE — 99215 OFFICE O/P EST HI 40 MIN: CPT | Performed by: NURSE PRACTITIONER

## 2024-08-27 RX ORDER — LANSOPRAZOLE 30 MG/1
30 CAPSULE, DELAYED RELEASE ORAL
COMMUNITY
Start: 2024-08-04

## 2024-08-27 RX ORDER — ESCITALOPRAM OXALATE 10 MG/1
10 TABLET ORAL DAILY
COMMUNITY
Start: 2024-08-04

## 2024-08-27 RX ORDER — LEVETIRACETAM 1000 MG/1
1000 TABLET ORAL EVERY 12 HOURS
COMMUNITY
Start: 2024-08-04

## 2024-08-27 NOTE — PROGRESS NOTES
[facility]  [facility]  [address]  [address]  [address]  [facility]  [address]  [phone] (phone)   [phone] (fax)  Hospital Follow-up / Tele-health Visit      Date:  24    Name:  MAYCOL WEBSTER  :  1970  MRN:  [id number]     PCP:  None, None    Maycol Webster is a 53 y.o. male who was seen by synchronous (real-time) audio-video technology on 2024 for Follow-Up from Hospital, Stroke, and Seizures    Assessment & Plan:   1. History of ischemic left MCA stroke  Assessment & Plan:  Patient with diabetes, hypertension, cocaine abuse, previous CVA, known cardiomyopathy (ejection fraction 10-15%) who was admitted to the hospital -2024 setting of an acute left MCA distribution ischemic infarct.  NIHSS 18, symptom onset time unknown so patient did not receive TNK, however he did undergo emergent mechanical thrombectomy.    Stroke was embolic in nature related to apical thrombus.    -CTA head and neck: Severe stenosis or occlusion of the left M3 or for branch  -CT perfusion: Left temporal infarction and I saw for measurement show per number of The volume, visually however there is likely no significant prenumbra  -MRI brain: Large left MCA infarct with extensive cortical hemorrhage and mass effect resulting in 11 mm of rightward shift small chronic infarcts in the right frontal and parietal lobes with chronic hemosiderin staining  -TTE: Severely reduced ventricular systolic function ejection fraction 10 to 15%, severe hypokinesis and akinesis.  There is a 38 x 15 mm large mass in the apex consistent with a thrombus  -Labs: Total cholesterol 171, LDL 99, triglycerides 115, hemoglobin A1c 13.0, UDS positive for cocaine    Continue Plavix 75 mg, Eliquis 5 mg  And Lipitor 80 mg nightly for secondary stroke prevention.  Goal LDL of 70.  Current LDL 99  Patient is no longer using cocaine  Patient needs to establish with PCP to  if his condition worsens, changes or fails to improve as anticipated. He expressed understanding with the diagnosis(es) and plan.     Maycol Caro, was evaluated through a synchronous (real-time) audio-video encounter. The patient (or guardian if applicable) is aware that this is a billable service, which includes applicable co-pays. This Virtual Visit was conducted with patient's (and/or legal guardian's) consent. Patient identification was verified, and a caregiver was present when appropriate.     The patient was located at Home:   [address] [place] [id number]    Provider was located at Home [place]Appt [place] State[place]    Confirm you are appropriately licensed, registered, or certified to deliver care in the state where the patient is located as indicated above. If you are not or unsure, please re-schedule the visit: Yes, I confirm.       Beverly Carballo DNP, ACNP-bc

## 2024-08-28 ENCOUNTER — TELEPHONE (OUTPATIENT)
Age: 54
End: 2024-08-28

## 2024-08-28 PROBLEM — Z86.73 HISTORY OF ISCHEMIC LEFT MCA STROKE: Status: ACTIVE | Noted: 2024-08-28

## 2024-08-28 PROBLEM — I63.412 ACUTE STROKE DUE TO EMBOLISM OF LEFT MIDDLE CEREBRAL ARTERY (HCC): Status: RESOLVED | Noted: 2024-02-08 | Resolved: 2024-08-28

## 2024-08-28 PROBLEM — I63.512 ACUTE ISCHEMIC LEFT MIDDLE CEREBRAL ARTERY (MCA) STROKE (HCC): Status: RESOLVED | Noted: 2024-02-08 | Resolved: 2024-08-28

## 2024-08-28 PROBLEM — I63.512 CEREBROVASCULAR ACCIDENT (CVA) DUE TO OCCLUSION OF LEFT MIDDLE CEREBRAL ARTERY (HCC): Status: RESOLVED | Noted: 2024-02-09 | Resolved: 2024-08-28

## 2024-08-28 PROBLEM — I69.398 SEIZURE AS LATE EFFECT OF CEREBROVASCULAR ACCIDENT (CVA) (HCC): Status: ACTIVE | Noted: 2024-08-28

## 2024-08-28 PROBLEM — I63.9 ACUTE CEREBROVASCULAR ACCIDENT (CVA) DUE TO ISCHEMIA (HCC): Status: RESOLVED | Noted: 2024-02-08 | Resolved: 2024-08-28

## 2024-08-28 PROBLEM — R56.9 SEIZURE AS LATE EFFECT OF CEREBROVASCULAR ACCIDENT (CVA) (HCC): Status: ACTIVE | Noted: 2024-08-28

## 2024-08-28 NOTE — ASSESSMENT & PLAN NOTE
-TTE: Severely reduced ventricular systolic function ejection fraction 10 to 15%, severe hypokinesis and akinesis.  There is a 38 x 15 mm large mass in the apex consistent with a thrombus    Continue Eliquis 5 mg twice daily  Patient needs to establish with a cardiologist to manage his risk factors including his diabetes, hypertension, and dyslipidemia.  Due to his insurance patient's brother is having difficulties finding cardiologist.  [place]  I will reach out to our office and see what resources are available to assist in finding a cardiologist.

## 2024-08-28 NOTE — ASSESSMENT & PLAN NOTE
Patient with diabetes, hypertension, cocaine abuse, previous CVA, known cardiomyopathy (ejection fraction 10-15%) who was admitted to the hospital 2/8-2/27/2024 setting of an acute left MCA distribution ischemic infarct.  NIHSS 18, symptom onset time unknown so patient did not receive TNK, however he did undergo emergent mechanical thrombectomy.    Stroke was embolic in nature related to apical thrombus.    -CTA head and neck: Severe stenosis or occlusion of the left M3 or for branch  -CT perfusion: Left temporal infarction and I saw for measurement show per number of The volume, visually however there is likely no significant prenumbra  -MRI brain: Large left MCA infarct with extensive cortical hemorrhage and mass effect resulting in 11 mm of rightward shift small chronic infarcts in the right frontal and parietal lobes with chronic hemosiderin staining  -TTE: Severely reduced ventricular systolic function ejection fraction 10 to 15%, severe hypokinesis and akinesis.  There is a 38 x 15 mm large mass in the apex consistent with a thrombus  -Labs: Total cholesterol 171, LDL 99, triglycerides 115, hemoglobin A1c 13.0, UDS positive for cocaine    Continue Plavix 75 mg, Eliquis 5 mg  And Lipitor 80 mg nightly for secondary stroke prevention.  Goal LDL of 70.  Current LDL 99  Patient is no longer using cocaine  Patient needs to establish with PCP to manage his risk factors including his diabetes, hypertension, and dyslipidemia.  Due to his insurance patient's brother is having difficulties finding providers.  [place] in [place].  I will reach out to our office and see what resources are available to assist in finding a primary care provider.  Patient will have in person follow-up as he requires an in person neurological exam.

## 2024-08-28 NOTE — TELEPHONE ENCOUNTER
Mailed list of [facility] and [facility] booklet to pt so he can pick PCP and cardiologist.  Has names and phone numbers.    Advised on booklet to check with insurance to make sure they are in-network.

## 2024-08-28 NOTE — ASSESSMENT & PLAN NOTE
Seizure in the setting of left MCA stroke.    Continue Keppra 1000 mg twice daily  At follow-up will order EEG and Keppra level

## (undated) DEVICE — SUTURE NONABSORBABLE MONOFILAMENT 3-0 PS-1 18 IN BLK ETHILON 1663H

## (undated) DEVICE — EXTREMITY - SMH: Brand: MEDLINE INDUSTRIES, INC.

## (undated) DEVICE — TOWEL SURG W17XL27IN STD BLU COT NONFENESTRATED PREWASHED

## (undated) DEVICE — SPONGE GZ W4XL4IN COT 12 PLY TYP VII WVN C FLD DSGN STERILE

## (undated) DEVICE — GLOVE SURG SZ 65 L12IN FNGR THK79MIL GRN LTX FREE

## (undated) DEVICE — GLOVE SURG SZ 65 THK91MIL LTX FREE SYN POLYISOPRENE

## (undated) DEVICE — PADDING CAST W3INXL4YD POLY POR SPUN DACRON SYN VERSATILE

## (undated) DEVICE — SOLUTION IRRIG 1000ML 0.9% SOD CHL USP POUR PLAS BTL

## (undated) DEVICE — PREMIUM WET SKIN PREP TRAY: Brand: MEDLINE INDUSTRIES, INC.

## (undated) DEVICE — INTENDED FOR TISSUE SEPARATION, AND OTHER PROCEDURES THAT REQUIRE A SHARP SURGICAL BLADE TO PUNCTURE OR CUT.: Brand: BARD-PARKER ® CARBON RIB-BACK BLADES